# Patient Record
Sex: FEMALE | Race: WHITE | URBAN - METROPOLITAN AREA
[De-identification: names, ages, dates, MRNs, and addresses within clinical notes are randomized per-mention and may not be internally consistent; named-entity substitution may affect disease eponyms.]

---

## 2017-10-16 ENCOUNTER — OUTPATIENT (OUTPATIENT)
Dept: OUTPATIENT SERVICES | Facility: HOSPITAL | Age: 67
LOS: 1 days | Discharge: HOME | End: 2017-10-16

## 2017-10-16 DIAGNOSIS — Z12.31 ENCOUNTER FOR SCREENING MAMMOGRAM FOR MALIGNANT NEOPLASM OF BREAST: ICD-10-CM

## 2018-10-18 ENCOUNTER — OUTPATIENT (OUTPATIENT)
Dept: OUTPATIENT SERVICES | Facility: HOSPITAL | Age: 68
LOS: 1 days | Discharge: HOME | End: 2018-10-18

## 2018-10-18 DIAGNOSIS — Z12.31 ENCOUNTER FOR SCREENING MAMMOGRAM FOR MALIGNANT NEOPLASM OF BREAST: ICD-10-CM

## 2019-10-21 ENCOUNTER — OUTPATIENT (OUTPATIENT)
Dept: OUTPATIENT SERVICES | Facility: HOSPITAL | Age: 69
LOS: 1 days | Discharge: HOME | End: 2019-10-21
Payer: MEDICARE

## 2019-10-21 DIAGNOSIS — Z12.31 ENCOUNTER FOR SCREENING MAMMOGRAM FOR MALIGNANT NEOPLASM OF BREAST: ICD-10-CM

## 2019-10-21 PROCEDURE — 77067 SCR MAMMO BI INCL CAD: CPT | Mod: 26

## 2019-10-21 PROCEDURE — 77063 BREAST TOMOSYNTHESIS BI: CPT | Mod: 26

## 2020-09-10 PROBLEM — Z00.00 ENCOUNTER FOR PREVENTIVE HEALTH EXAMINATION: Status: ACTIVE | Noted: 2020-09-10

## 2020-09-16 ENCOUNTER — TRANSCRIPTION ENCOUNTER (OUTPATIENT)
Age: 70
End: 2020-09-16

## 2020-09-16 ENCOUNTER — APPOINTMENT (OUTPATIENT)
Dept: VASCULAR SURGERY | Facility: CLINIC | Age: 70
End: 2020-09-16
Payer: MEDICARE

## 2020-09-16 DIAGNOSIS — Z86.39 PERSONAL HISTORY OF OTHER ENDOCRINE, NUTRITIONAL AND METABOLIC DISEASE: ICD-10-CM

## 2020-09-16 DIAGNOSIS — Z87.891 PERSONAL HISTORY OF NICOTINE DEPENDENCE: ICD-10-CM

## 2020-09-16 DIAGNOSIS — I77.1 STRICTURE OF ARTERY: ICD-10-CM

## 2020-09-16 DIAGNOSIS — I77.9 DISORDER OF ARTERIES AND ARTERIOLES, UNSPECIFIED: ICD-10-CM

## 2020-09-16 DIAGNOSIS — Z78.9 OTHER SPECIFIED HEALTH STATUS: ICD-10-CM

## 2020-09-16 PROCEDURE — 93880 EXTRACRANIAL BILAT STUDY: CPT

## 2020-09-16 PROCEDURE — 99203 OFFICE O/P NEW LOW 30 MIN: CPT

## 2020-09-16 RX ORDER — MULTIVIT-MIN/FOLIC/VIT K/LYCOP 400-300MCG
1000 TABLET ORAL
Refills: 0 | Status: ACTIVE | COMMUNITY

## 2020-09-16 RX ORDER — LEVOTHYROXINE SODIUM 0.05 MG/1
50 TABLET ORAL
Refills: 0 | Status: ACTIVE | COMMUNITY

## 2020-09-16 RX ORDER — BACILLUS COAGULANS/INULIN 1B-250 MG
CAPSULE ORAL
Refills: 0 | Status: ACTIVE | COMMUNITY

## 2020-09-16 RX ORDER — EZETIMIBE 10 MG/1
10 TABLET ORAL
Refills: 0 | Status: ACTIVE | COMMUNITY

## 2020-09-16 RX ORDER — ASPIRIN 81 MG
81 TABLET, DELAYED RELEASE (ENTERIC COATED) ORAL
Refills: 0 | Status: ACTIVE | COMMUNITY

## 2020-09-16 RX ORDER — CALCIUM CARB/VIT D3/MINERALS 1200-1000
1200-1000 TABLET,CHEWABLE ORAL
Refills: 0 | Status: ACTIVE | COMMUNITY

## 2020-09-16 RX ORDER — LEVOCETIRIZINE DIHYDROCHLORIDE 5 MG/1
5 TABLET, FILM COATED ORAL
Refills: 0 | Status: ACTIVE | COMMUNITY

## 2020-09-16 NOTE — REASON FOR VISIT
[Consultation] : a consultation visit [FreeTextEntry1] : for carotid stenosis and left vertebral artery stenosis

## 2020-09-16 NOTE — ASSESSMENT
[FreeTextEntry1] : Patient is a 70-year-old female with asymptomatic subclavian stenosis and vertebral stenosis on the left side. From my standpoint I would like to continue to monitor her conservatively. A carotid arteries a less than 50% bilaterally. The vascular surgery intervention at this time I recommend trimming on antiplatelet regimen as a lipid lowering agent and I'll see her back in my office in one years time or sooner should become symptomatic

## 2020-10-22 ENCOUNTER — OUTPATIENT (OUTPATIENT)
Dept: OUTPATIENT SERVICES | Facility: HOSPITAL | Age: 70
LOS: 1 days | Discharge: HOME | End: 2020-10-22
Payer: MEDICARE

## 2020-10-22 DIAGNOSIS — Z12.31 ENCOUNTER FOR SCREENING MAMMOGRAM FOR MALIGNANT NEOPLASM OF BREAST: ICD-10-CM

## 2020-10-22 PROCEDURE — 77063 BREAST TOMOSYNTHESIS BI: CPT | Mod: 26

## 2020-10-22 PROCEDURE — 77067 SCR MAMMO BI INCL CAD: CPT | Mod: 26

## 2021-10-05 ENCOUNTER — INPATIENT (INPATIENT)
Facility: HOSPITAL | Age: 71
LOS: 8 days | Discharge: HOME HEALTH | End: 2021-10-14
Attending: SURGERY | Admitting: SURGERY
Payer: MEDICARE

## 2021-10-05 ENCOUNTER — RESULT REVIEW (OUTPATIENT)
Age: 71
End: 2021-10-05

## 2021-10-05 VITALS
RESPIRATION RATE: 18 BRPM | OXYGEN SATURATION: 100 % | DIASTOLIC BLOOD PRESSURE: 73 MMHG | HEART RATE: 110 BPM | SYSTOLIC BLOOD PRESSURE: 115 MMHG | TEMPERATURE: 101 F

## 2021-10-05 DIAGNOSIS — Z98.890 OTHER SPECIFIED POSTPROCEDURAL STATES: Chronic | ICD-10-CM

## 2021-10-05 DIAGNOSIS — Z90.710 ACQUIRED ABSENCE OF BOTH CERVIX AND UTERUS: Chronic | ICD-10-CM

## 2021-10-05 LAB
ALBUMIN SERPL ELPH-MCNC: 3.7 G/DL — SIGNIFICANT CHANGE UP (ref 3.5–5.2)
ALP SERPL-CCNC: 71 U/L — SIGNIFICANT CHANGE UP (ref 30–115)
ALT FLD-CCNC: 17 U/L — SIGNIFICANT CHANGE UP (ref 0–41)
ANION GAP SERPL CALC-SCNC: 19 MMOL/L — HIGH (ref 7–14)
APTT BLD: 23.2 SEC — CRITICAL LOW (ref 27–39.2)
AST SERPL-CCNC: 13 U/L — SIGNIFICANT CHANGE UP (ref 0–41)
BASOPHILS # BLD AUTO: 0.01 K/UL — SIGNIFICANT CHANGE UP (ref 0–0.2)
BASOPHILS NFR BLD AUTO: 0.1 % — SIGNIFICANT CHANGE UP (ref 0–1)
BILIRUB SERPL-MCNC: 0.8 MG/DL — SIGNIFICANT CHANGE UP (ref 0.2–1.2)
BLD GP AB SCN SERPL QL: SIGNIFICANT CHANGE UP
BUN SERPL-MCNC: 22 MG/DL — HIGH (ref 10–20)
CALCIUM SERPL-MCNC: 8.9 MG/DL — SIGNIFICANT CHANGE UP (ref 8.5–10.1)
CHLORIDE SERPL-SCNC: 101 MMOL/L — SIGNIFICANT CHANGE UP (ref 98–110)
CO2 SERPL-SCNC: 17 MMOL/L — SIGNIFICANT CHANGE UP (ref 17–32)
CREAT SERPL-MCNC: 0.6 MG/DL — LOW (ref 0.7–1.5)
EOSINOPHIL # BLD AUTO: 0 K/UL — SIGNIFICANT CHANGE UP (ref 0–0.7)
EOSINOPHIL NFR BLD AUTO: 0 % — SIGNIFICANT CHANGE UP (ref 0–8)
GLUCOSE SERPL-MCNC: 124 MG/DL — HIGH (ref 70–99)
HCT VFR BLD CALC: 33.6 % — LOW (ref 37–47)
HGB BLD-MCNC: 11.2 G/DL — LOW (ref 12–16)
IMM GRANULOCYTES NFR BLD AUTO: 0.3 % — SIGNIFICANT CHANGE UP (ref 0.1–0.3)
INR BLD: 1.22 RATIO — SIGNIFICANT CHANGE UP (ref 0.65–1.3)
LACTATE SERPL-SCNC: 2.4 MMOL/L — HIGH (ref 0.7–2)
LIDOCAIN IGE QN: 11 U/L — SIGNIFICANT CHANGE UP (ref 7–60)
LYMPHOCYTES # BLD AUTO: 0.86 K/UL — LOW (ref 1.2–3.4)
LYMPHOCYTES # BLD AUTO: 7.5 % — LOW (ref 20.5–51.1)
MCHC RBC-ENTMCNC: 29.1 PG — SIGNIFICANT CHANGE UP (ref 27–31)
MCHC RBC-ENTMCNC: 33.3 G/DL — SIGNIFICANT CHANGE UP (ref 32–37)
MCV RBC AUTO: 87.3 FL — SIGNIFICANT CHANGE UP (ref 81–99)
MONOCYTES # BLD AUTO: 0.64 K/UL — HIGH (ref 0.1–0.6)
MONOCYTES NFR BLD AUTO: 5.6 % — SIGNIFICANT CHANGE UP (ref 1.7–9.3)
NEUTROPHILS # BLD AUTO: 9.88 K/UL — HIGH (ref 1.4–6.5)
NEUTROPHILS NFR BLD AUTO: 86.5 % — HIGH (ref 42.2–75.2)
NRBC # BLD: 0 /100 WBCS — SIGNIFICANT CHANGE UP (ref 0–0)
PLATELET # BLD AUTO: 326 K/UL — SIGNIFICANT CHANGE UP (ref 130–400)
POTASSIUM SERPL-MCNC: 3.7 MMOL/L — SIGNIFICANT CHANGE UP (ref 3.5–5)
POTASSIUM SERPL-SCNC: 3.7 MMOL/L — SIGNIFICANT CHANGE UP (ref 3.5–5)
PROT SERPL-MCNC: 6.4 G/DL — SIGNIFICANT CHANGE UP (ref 6–8)
PROTHROM AB SERPL-ACNC: 14 SEC — HIGH (ref 9.95–12.87)
RBC # BLD: 3.85 M/UL — LOW (ref 4.2–5.4)
RBC # FLD: 13.5 % — SIGNIFICANT CHANGE UP (ref 11.5–14.5)
SARS-COV-2 RNA SPEC QL NAA+PROBE: SIGNIFICANT CHANGE UP
SODIUM SERPL-SCNC: 137 MMOL/L — SIGNIFICANT CHANGE UP (ref 135–146)
WBC # BLD: 11.42 K/UL — HIGH (ref 4.8–10.8)
WBC # FLD AUTO: 11.42 K/UL — HIGH (ref 4.8–10.8)

## 2021-10-05 PROCEDURE — 93010 ELECTROCARDIOGRAM REPORT: CPT

## 2021-10-05 PROCEDURE — 71045 X-RAY EXAM CHEST 1 VIEW: CPT | Mod: 26

## 2021-10-05 PROCEDURE — 74177 CT ABD & PELVIS W/CONTRAST: CPT | Mod: 26,MA

## 2021-10-05 PROCEDURE — 99285 EMERGENCY DEPT VISIT HI MDM: CPT | Mod: GC

## 2021-10-05 RX ORDER — PIPERACILLIN AND TAZOBACTAM 4; .5 G/20ML; G/20ML
3.38 INJECTION, POWDER, LYOPHILIZED, FOR SOLUTION INTRAVENOUS ONCE
Refills: 0 | Status: COMPLETED | OUTPATIENT
Start: 2021-10-05 | End: 2021-10-05

## 2021-10-05 RX ORDER — MORPHINE SULFATE 50 MG/1
4 CAPSULE, EXTENDED RELEASE ORAL ONCE
Refills: 0 | Status: DISCONTINUED | OUTPATIENT
Start: 2021-10-05 | End: 2021-10-05

## 2021-10-05 RX ORDER — SODIUM CHLORIDE 9 MG/ML
1000 INJECTION, SOLUTION INTRAVENOUS
Refills: 0 | Status: DISCONTINUED | OUTPATIENT
Start: 2021-10-05 | End: 2021-10-06

## 2021-10-05 RX ORDER — SODIUM CHLORIDE 9 MG/ML
1000 INJECTION, SOLUTION INTRAVENOUS ONCE
Refills: 0 | Status: COMPLETED | OUTPATIENT
Start: 2021-10-05 | End: 2021-10-05

## 2021-10-05 RX ORDER — IBUPROFEN 200 MG
400 TABLET ORAL EVERY 6 HOURS
Refills: 0 | Status: DISCONTINUED | OUTPATIENT
Start: 2021-10-05 | End: 2021-10-06

## 2021-10-05 RX ORDER — ONDANSETRON 8 MG/1
4 TABLET, FILM COATED ORAL ONCE
Refills: 0 | Status: COMPLETED | OUTPATIENT
Start: 2021-10-05 | End: 2021-10-05

## 2021-10-05 RX ORDER — MORPHINE SULFATE 50 MG/1
2 CAPSULE, EXTENDED RELEASE ORAL ONCE
Refills: 0 | Status: DISCONTINUED | OUTPATIENT
Start: 2021-10-05 | End: 2021-10-05

## 2021-10-05 RX ORDER — SODIUM CHLORIDE 9 MG/ML
1000 INJECTION INTRAMUSCULAR; INTRAVENOUS; SUBCUTANEOUS ONCE
Refills: 0 | Status: COMPLETED | OUTPATIENT
Start: 2021-10-05 | End: 2021-10-05

## 2021-10-05 RX ORDER — PANTOPRAZOLE SODIUM 20 MG/1
40 TABLET, DELAYED RELEASE ORAL
Refills: 0 | Status: DISCONTINUED | OUTPATIENT
Start: 2021-10-05 | End: 2021-10-06

## 2021-10-05 RX ORDER — ACETAMINOPHEN 500 MG
975 TABLET ORAL ONCE
Refills: 0 | Status: COMPLETED | OUTPATIENT
Start: 2021-10-05 | End: 2021-10-05

## 2021-10-05 RX ORDER — MORPHINE SULFATE 50 MG/1
2 CAPSULE, EXTENDED RELEASE ORAL EVERY 4 HOURS
Refills: 0 | Status: DISCONTINUED | OUTPATIENT
Start: 2021-10-05 | End: 2021-10-06

## 2021-10-05 RX ORDER — ENOXAPARIN SODIUM 100 MG/ML
40 INJECTION SUBCUTANEOUS EVERY 24 HOURS
Refills: 0 | Status: DISCONTINUED | OUTPATIENT
Start: 2021-10-05 | End: 2021-10-06

## 2021-10-05 RX ORDER — ACETAMINOPHEN 500 MG
650 TABLET ORAL EVERY 6 HOURS
Refills: 0 | Status: DISCONTINUED | OUTPATIENT
Start: 2021-10-05 | End: 2021-10-06

## 2021-10-05 RX ORDER — CHLORHEXIDINE GLUCONATE 213 G/1000ML
1 SOLUTION TOPICAL
Refills: 0 | Status: DISCONTINUED | OUTPATIENT
Start: 2021-10-05 | End: 2021-10-06

## 2021-10-05 RX ADMIN — MORPHINE SULFATE 4 MILLIGRAM(S): 50 CAPSULE, EXTENDED RELEASE ORAL at 17:02

## 2021-10-05 RX ADMIN — MORPHINE SULFATE 2 MILLIGRAM(S): 50 CAPSULE, EXTENDED RELEASE ORAL at 23:07

## 2021-10-05 RX ADMIN — Medication 975 MILLIGRAM(S): at 17:03

## 2021-10-05 RX ADMIN — SODIUM CHLORIDE 1000 MILLILITER(S): 9 INJECTION, SOLUTION INTRAVENOUS at 23:07

## 2021-10-05 RX ADMIN — ONDANSETRON 4 MILLIGRAM(S): 8 TABLET, FILM COATED ORAL at 17:02

## 2021-10-05 RX ADMIN — SODIUM CHLORIDE 1000 MILLILITER(S): 9 INJECTION INTRAMUSCULAR; INTRAVENOUS; SUBCUTANEOUS at 17:03

## 2021-10-05 NOTE — ED PROVIDER NOTE - OBJECTIVE STATEMENT
Patient is a 70 yo female with PMHx of hypothyroidism, HLD, s/p hernia repair, s/p hysterectomy c/o abdominal pain and nausea/vomiting x 4 days. Patient had gradual onset of worsening, severe, constant, sharp, LLQ abdominal pain, associated with nausea and multiple episodes of dark emesis since 4 days ago. Fever up to 101F in ED. Denies chills, chest pain, SOB, cough, diarrhea, blood in stool, melena, dysuria. 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 Patient saw Dr. Juarez today, he sent patient to go to the ED, and called surgery team to see her.

## 2021-10-05 NOTE — H&P ADULT - ATTENDING COMMENTS
above noted discussed case with surgical resident DR Torres and pt on 10/5/21 abdomen soft tenderness diffuse plus rebound ct scan noted needs emergency surgery pt aware of colostomy

## 2021-10-05 NOTE — CONSULT NOTE ADULT - SUBJECTIVE AND OBJECTIVE BOX
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 Juarez in the office with severe Lower abdominal pain. Patient reports that the pain is associated with nausea and vomiting, and has been going on for the past 4 days. Patient also reports fevers as high as 101. Denies chills, chest pain, shortness of breath, change in bowel function, urinary symptoms, or recent trauma.       PAST MEDICAL & SURGICAL HISTORY:  Hypothyroidism    HLD (hyperlipidemia)    S/P hysterectomy    H/O hernia repair        Home Medications:  Synthroid, Zetia      VITALS:  T(F): 101.4 (10-05-21 @ 16:20), Max: 101.4 (10-05-21 @ 16:20)  HR: 110 (10-05-21 @ 16:20) (110 - 110)  BP: 115/73 (10-05-21 @ 16:20) (115/73 - 115/73)  RR: 18 (10-05-21 @ 16:20) (18 - 18)  SpO2: 100% (10-05-21 @ 16:20) (100% - 100%)    PHYSICAL EXAM:  General: NAD, AAOx3, calm and cooperative  Cardiac: Tachycardiac  Respiratory: CTAB, normal respiratory effort, breath sounds equal BL  Abdomen: Soft, non-distended, moderately-tender RLQ and LLQ (great improvement with morphine), no rebound, + voluntary guarding.  Musculoskeletal: Strength 5/5 BL UE/LE, ROM intact, compartments soft  Neuro: Sensation grossly intact and equal throughout, no focal deficits  Skin: Warm/dry, normal color, no jaundice      LAB/STUDIES:                        11.2   11.42 )-----------( 326      ( 05 Oct 2021 16:44 )             33.6         PT/INR - ( 05 Oct 2021 16:55 )   PT: 14.00 sec;   INR: 1.22 ratio         PTT - ( 05 Oct 2021 16:55 )  PTT:23.2 sec          IMAGING:  New CT Pending    CT from July showing diffuse thickening of the descending and sigmoid colon, suspicious for ischemic colitis.    Xray: no free air

## 2021-10-05 NOTE — ED ADULT NURSE NOTE - OBJECTIVE STATEMENT
diffuse abdominal pain onset friday. worsening today. patient abdomen soft tender throughout with intensity to lower quadrants

## 2021-10-05 NOTE — ED ADULT TRIAGE NOTE - CHIEF COMPLAINT QUOTE
abdominal pain x 4 days, patient being sent in by dr. Juarez abdominal pain x 4 days, patient being sent in by dr. Ann toscano (friend) [phone]

## 2021-10-05 NOTE — ED PROVIDER NOTE - CLINICAL SUMMARY MEDICAL DECISION MAKING FREE TEXT BOX
70 yo female PMH hypothyroidism and dyslipidemia sent by Dr Juarez for evaluation of abdominal pain .  Patient developed pain 4-5 days ago, located in the lower abdomen and now associated with nausea and a few episodes of non-bloody emesis since last night, she c/o loss of appetite and poor PO intake foir days.  Denies dysuria or hematuria, no flank pain, no CP, SOB, cough, HA, focal weakness or paresthesias.  Noted to be febrile in ED.   Thin elderly female resting on a stretcher uncomfortable due to pain,, head AT/NC, PERRL, pink conjunctivae,  dry oral mucosa, nml phonation without drooling or trismus, supple neck without midline spine ttp, nml work of breathing, lungs CTA b/l, equal air entry, RRR, speaking full sentences,  well-perfused extremities, distal pulses intact, abdomen soft, ND, + diffuse ttp with voluntary guarding and rebound mostly in the lower quadrants rt>lt,  no leg edema or unilateral calf swelling, A&Ox3, no focal neuro deficits, nml mood and affect.  Plan: analgesia, labs, CT scan, surgery consult, admit.

## 2021-10-05 NOTE — ED PROVIDER NOTE - ATTENDING CONTRIBUTION TO CARE
72 yo female PMH hypothyroidism and dyslipidemia sent by Dr Juarez for evaluation of abdominal pain .  Patient developed pain 4-5 days ago, located in the lower abdomen and now associated with nausea and a few episodes of non-bloody emesis since last night, she c/o loss of appetite and poor PO intake foir days.  Denies dysuria or hematuria, no flank pain, no CP, SOB, cough, HA, focal weakness or paresthesias.  Noted to be febrile in ED.   Thin elderly female resting on a stretcher uncomfortable due to pain,, head AT/NC, PERRL, pink conjunctivae,  dry oral mucosa, nml phonation without drooling or trismus, supple neck without midline spine ttp, nml work of breathing, lungs CTA b/l, equal air entry, RRR, speaking full sentences,  well-perfused extremities, distal pulses intact, abdomen soft, ND, + diffuse ttp with voluntary guarding and rebound mostly in the lower quadrants rt>lt,  no leg edema or unilateral calf swelling, A&Ox3, no focal neuro deficits, nml mood and affect.  Plan: analgesia, labs, CT scan, surgery consult, admit.

## 2021-10-05 NOTE — ED PROVIDER NOTE - PHYSICAL EXAMINATION
CONSTITUTIONAL: Well-developed; well-nourished, in pain and uncomfortable, laying on bed  SKIN: warm, dry  HEAD: Normocephalic; atraumatic.  EYES: no conjunctival injection. PERRL.   ENT: No nasal discharge; airway clear.  NECK: Supple; non tender.  CARD: S1, S2 normal; no murmurs, gallops, or rubs. Tachycardic and regular rhythm.   RESP: No wheezes, rales or rhonchi.  ABD: involuntary guarding, LLQ and suprapubic tenderness, no CVAT b/l  EXT: Normal ROM.  No clubbing, cyanosis or edema.   LYMPH: No acute cervical adenopathy.  NEURO: Alert, oriented, grossly unremarkable  PSYCH: Cooperative, appropriate.

## 2021-10-05 NOTE — ED PROVIDER NOTE - PROGRESS NOTE DETAILS
MQ: Sent in by Dr. Juarez, called surgery and they are aware of patient, will obtain labs, CT abd/pelvis, cxr, ecg, will give iv fluids, morphine, zofran, and reassess Pilat:   repeat VS show low BP, however patient appears well and states her systolic BP is typically 90.  Pain hs improved, but now slowly returning,  @ mg dose of Morphine and fluids were ordered.  CT shows bowel perf.

## 2021-10-05 NOTE — H&P ADULT - HISTORY OF PRESENT ILLNESS
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 Juarez in the office with severe Lower abdominal pain. Patient reports that the pain is associated with nausea and vomiting, and has been going on for the past 4 days. Patient also reports fevers as high as 101. Denies chills, chest pain, shortness of breath, change in bowel function, urinary symptoms, or recent trauma.

## 2021-10-05 NOTE — H&P ADULT - NSHPLABSRESULTS_GEN_ALL_CORE
Labs:                          11.2   11.42 )-----------( 326      ( 05 Oct 2021 16:44 )             33.6       Auto Neutrophil %: 86.5 % (10-05-21 @ 16:44)  Auto Immature Granulocyte %: 0.3 % (10-05-21 @ 16:44)    10-05    137  |  101  |  22<H>  ----------------------------<  124<H>  3.7   |  17  |  0.6<L>    Calcium, Total Serum: 8.9 mg/dL (10-05-21 @ 16:44)    LFTs:             6.4  | 0.8  | 13       ------------------[71      ( 05 Oct 2021 16:44 )  3.7  | x    | 17          Lipase:11       Lactate, Blood: 2.4 mmol/L (10-05-21 @ 16:44)    Coags:     14.00  ----< 1.22    ( 05 Oct 2021 16:55 )     23.2      Radiology:   < from: CT Abdomen and Pelvis w/ IV Cont (10.05.21 @ 21:41) >  IMPRESSION:  Pneumoperitoneum compatible with bowel perforation, the site of which is not definitively determined however the bulk of free air appears to be in the mid pelvis superior to the vaginal cuff and anterior to the rectosigmoid colon.    There are additional findings of left colonic wall thickening, cecal dilatation and inflammatory changes predominantly in the pelvis with trace ascites. Findings may be primary inflammation or reactive.

## 2021-10-05 NOTE — H&P ADULT - NSHPPHYSICALEXAM_GEN_ALL_CORE
General: NAD, AAOx3, calm and cooperative  Cardiac: Tachycardiac  Respiratory: CTAB, normal respiratory effort, breath sounds equal BL  Abdomen: Soft, non-distended, moderately-tender RLQ and LLQ (great improvement with morphine), no rebound, + voluntary guarding.  Musculoskeletal: Strength 5/5 BL UE/LE, ROM intact, compartments soft  Neuro: Sensation grossly intact and equal throughout, no focal deficits  Skin: Warm/dry, normal color, no jaundice

## 2021-10-05 NOTE — ED PROVIDER NOTE - NS ED ROS FT
Review of Systems:  •	CONSTITUTIONAL - No fever, No diaphoresis, No weight change  •	SKIN - No rash  •	HEMATOLOGIC - No abnormal bleeding or bruising  •	EYES - No eye pain, No blurred vision  •	ENT - No change in hearing, No sore throat, No neck pain, No rhinorrhea, No ear pain  •	RESPIRATORY - No shortness of breath, No cough  •	CARDIAC -No chest pain, No palpitations  •	GI - + LLQ abdominal pain, + nausea, + vomiting, No diarrhea, No constipation, No bright red blood per rectum or melena. No flank pain  •                 - No dysuria, frequency, hematuria.   •	ENDO - No polydypsia, No polyuria, No heat/cold intolerance  •	MUSCULOSKELETAL - No joint paint, No swelling, No back pain  •	NEUROLOGIC - No numbness, No focal weakness, No headache, No dizziness  All other systems negative, unless specified in HPI

## 2021-10-05 NOTE — H&P ADULT - ASSESSMENT
71yF w/ PMHx of hypothyroidism, HLD, and a hysterectomy in May 2021, followed by diagnosis of ischemic colitis in July who presented with severe abdominal pain from Dr. Juarez's office, improved with morphine. Mildly tachycardiac and febrile. WBC 11.42. Clinical findings, labs, and imaging consistent with peritonitis secondary to perforated viscus, likely colonic.     Plan:   -Admission to Surgery Service   -Added on and consented for exploratory laparotomy, possible bowel resection, possible ostomy   -NPO, IVF  -Continuous hemodynamic monitoring  -IV Zosyn  -Adequate pain control  -Continue home medications   -DVT ppx   -GI ppx   -Patient and plan discussed with Dr. Juarez

## 2021-10-06 LAB
ANION GAP SERPL CALC-SCNC: 12 MMOL/L — SIGNIFICANT CHANGE UP (ref 7–14)
ANION GAP SERPL CALC-SCNC: 14 MMOL/L — SIGNIFICANT CHANGE UP (ref 7–14)
APPEARANCE UR: CLEAR — SIGNIFICANT CHANGE UP
BACTERIA # UR AUTO: NEGATIVE — SIGNIFICANT CHANGE UP
BASOPHILS # BLD AUTO: 0.01 K/UL — SIGNIFICANT CHANGE UP (ref 0–0.2)
BASOPHILS # BLD AUTO: 0.02 K/UL — SIGNIFICANT CHANGE UP (ref 0–0.2)
BASOPHILS NFR BLD AUTO: 0.1 % — SIGNIFICANT CHANGE UP (ref 0–1)
BASOPHILS NFR BLD AUTO: 0.2 % — SIGNIFICANT CHANGE UP (ref 0–1)
BILIRUB UR-MCNC: NEGATIVE — SIGNIFICANT CHANGE UP
BUN SERPL-MCNC: 13 MG/DL — SIGNIFICANT CHANGE UP (ref 10–20)
BUN SERPL-MCNC: 15 MG/DL — SIGNIFICANT CHANGE UP (ref 10–20)
CALCIUM SERPL-MCNC: 6.9 MG/DL — LOW (ref 8.5–10.1)
CALCIUM SERPL-MCNC: 7.1 MG/DL — LOW (ref 8.5–10.1)
CHLORIDE SERPL-SCNC: 104 MMOL/L — SIGNIFICANT CHANGE UP (ref 98–110)
CHLORIDE SERPL-SCNC: 104 MMOL/L — SIGNIFICANT CHANGE UP (ref 98–110)
CK MB CFR SERPL CALC: 1.5 NG/ML — SIGNIFICANT CHANGE UP (ref 0.6–6.3)
CK MB CFR SERPL CALC: <1 NG/ML — SIGNIFICANT CHANGE UP (ref 0.6–6.3)
CK SERPL-CCNC: 121 U/L — SIGNIFICANT CHANGE UP (ref 0–225)
CK SERPL-CCNC: 59 U/L — SIGNIFICANT CHANGE UP (ref 0–225)
CO2 SERPL-SCNC: 17 MMOL/L — SIGNIFICANT CHANGE UP (ref 17–32)
CO2 SERPL-SCNC: 17 MMOL/L — SIGNIFICANT CHANGE UP (ref 17–32)
COLOR SPEC: YELLOW — SIGNIFICANT CHANGE UP
CREAT SERPL-MCNC: 0.6 MG/DL — LOW (ref 0.7–1.5)
CREAT SERPL-MCNC: <0.5 MG/DL — LOW (ref 0.7–1.5)
DIFF PNL FLD: ABNORMAL
EOSINOPHIL # BLD AUTO: 0 K/UL — SIGNIFICANT CHANGE UP (ref 0–0.7)
EOSINOPHIL # BLD AUTO: 0.01 K/UL — SIGNIFICANT CHANGE UP (ref 0–0.7)
EOSINOPHIL NFR BLD AUTO: 0 % — SIGNIFICANT CHANGE UP (ref 0–8)
EOSINOPHIL NFR BLD AUTO: 0.1 % — SIGNIFICANT CHANGE UP (ref 0–8)
EPI CELLS # UR: 4 /HPF — SIGNIFICANT CHANGE UP (ref 0–5)
GAS PNL BLDA: SIGNIFICANT CHANGE UP
GLUCOSE SERPL-MCNC: 121 MG/DL — HIGH (ref 70–99)
GLUCOSE SERPL-MCNC: 159 MG/DL — HIGH (ref 70–99)
GLUCOSE UR QL: SIGNIFICANT CHANGE UP
HCT VFR BLD CALC: 29.3 % — LOW (ref 37–47)
HCT VFR BLD CALC: 32 % — LOW (ref 37–47)
HCV AB S/CO SERPL IA: 0.03 COI — SIGNIFICANT CHANGE UP
HCV AB SERPL-IMP: SIGNIFICANT CHANGE UP
HGB BLD-MCNC: 10.5 G/DL — LOW (ref 12–16)
HGB BLD-MCNC: 9.7 G/DL — LOW (ref 12–16)
HYALINE CASTS # UR AUTO: 7 /LPF — SIGNIFICANT CHANGE UP (ref 0–7)
IMM GRANULOCYTES NFR BLD AUTO: 0.3 % — SIGNIFICANT CHANGE UP (ref 0.1–0.3)
IMM GRANULOCYTES NFR BLD AUTO: 0.4 % — HIGH (ref 0.1–0.3)
KETONES UR-MCNC: ABNORMAL
LEUKOCYTE ESTERASE UR-ACNC: NEGATIVE — SIGNIFICANT CHANGE UP
LYMPHOCYTES # BLD AUTO: 0.54 K/UL — LOW (ref 1.2–3.4)
LYMPHOCYTES # BLD AUTO: 0.65 K/UL — LOW (ref 1.2–3.4)
LYMPHOCYTES # BLD AUTO: 5.6 % — LOW (ref 20.5–51.1)
LYMPHOCYTES # BLD AUTO: 6.8 % — LOW (ref 20.5–51.1)
MAGNESIUM SERPL-MCNC: 1.4 MG/DL — LOW (ref 1.8–2.4)
MAGNESIUM SERPL-MCNC: 2.3 MG/DL — SIGNIFICANT CHANGE UP (ref 1.8–2.4)
MCHC RBC-ENTMCNC: 29.5 PG — SIGNIFICANT CHANGE UP (ref 27–31)
MCHC RBC-ENTMCNC: 29.8 PG — SIGNIFICANT CHANGE UP (ref 27–31)
MCHC RBC-ENTMCNC: 32.8 G/DL — SIGNIFICANT CHANGE UP (ref 32–37)
MCHC RBC-ENTMCNC: 33.1 G/DL — SIGNIFICANT CHANGE UP (ref 32–37)
MCV RBC AUTO: 89.9 FL — SIGNIFICANT CHANGE UP (ref 81–99)
MCV RBC AUTO: 89.9 FL — SIGNIFICANT CHANGE UP (ref 81–99)
MONOCYTES # BLD AUTO: 0.34 K/UL — SIGNIFICANT CHANGE UP (ref 0.1–0.6)
MONOCYTES # BLD AUTO: 0.68 K/UL — HIGH (ref 0.1–0.6)
MONOCYTES NFR BLD AUTO: 4.3 % — SIGNIFICANT CHANGE UP (ref 1.7–9.3)
MONOCYTES NFR BLD AUTO: 5.9 % — SIGNIFICANT CHANGE UP (ref 1.7–9.3)
NEUTROPHILS # BLD AUTO: 10.17 K/UL — HIGH (ref 1.4–6.5)
NEUTROPHILS # BLD AUTO: 6.98 K/UL — HIGH (ref 1.4–6.5)
NEUTROPHILS NFR BLD AUTO: 88 % — HIGH (ref 42.2–75.2)
NEUTROPHILS NFR BLD AUTO: 88.3 % — HIGH (ref 42.2–75.2)
NITRITE UR-MCNC: NEGATIVE — SIGNIFICANT CHANGE UP
NRBC # BLD: 0 /100 WBCS — SIGNIFICANT CHANGE UP (ref 0–0)
NRBC # BLD: 0 /100 WBCS — SIGNIFICANT CHANGE UP (ref 0–0)
PH UR: 6 — SIGNIFICANT CHANGE UP (ref 5–8)
PHOSPHATE SERPL-MCNC: 2 MG/DL — LOW (ref 2.1–4.9)
PHOSPHATE SERPL-MCNC: 3 MG/DL — SIGNIFICANT CHANGE UP (ref 2.1–4.9)
PLATELET # BLD AUTO: 257 K/UL — SIGNIFICANT CHANGE UP (ref 130–400)
PLATELET # BLD AUTO: 277 K/UL — SIGNIFICANT CHANGE UP (ref 130–400)
POTASSIUM SERPL-MCNC: 3.6 MMOL/L — SIGNIFICANT CHANGE UP (ref 3.5–5)
POTASSIUM SERPL-MCNC: 3.7 MMOL/L — SIGNIFICANT CHANGE UP (ref 3.5–5)
POTASSIUM SERPL-SCNC: 3.6 MMOL/L — SIGNIFICANT CHANGE UP (ref 3.5–5)
POTASSIUM SERPL-SCNC: 3.7 MMOL/L — SIGNIFICANT CHANGE UP (ref 3.5–5)
PROT UR-MCNC: ABNORMAL
RBC # BLD: 3.26 M/UL — LOW (ref 4.2–5.4)
RBC # BLD: 3.56 M/UL — LOW (ref 4.2–5.4)
RBC # FLD: 13.7 % — SIGNIFICANT CHANGE UP (ref 11.5–14.5)
RBC # FLD: 13.8 % — SIGNIFICANT CHANGE UP (ref 11.5–14.5)
RBC CASTS # UR COMP ASSIST: 54 /HPF — HIGH (ref 0–4)
SODIUM SERPL-SCNC: 133 MMOL/L — LOW (ref 135–146)
SODIUM SERPL-SCNC: 135 MMOL/L — SIGNIFICANT CHANGE UP (ref 135–146)
SP GR SPEC: >1.05 (ref 1.01–1.03)
TRIGL SERPL-MCNC: 69 MG/DL — SIGNIFICANT CHANGE UP
TROPONIN T SERPL-MCNC: <0.01 NG/ML — SIGNIFICANT CHANGE UP
TROPONIN T SERPL-MCNC: <0.01 NG/ML — SIGNIFICANT CHANGE UP
UROBILINOGEN FLD QL: SIGNIFICANT CHANGE UP
WBC # BLD: 11.56 K/UL — HIGH (ref 4.8–10.8)
WBC # BLD: 7.91 K/UL — SIGNIFICANT CHANGE UP (ref 4.8–10.8)
WBC # FLD AUTO: 11.56 K/UL — HIGH (ref 4.8–10.8)
WBC # FLD AUTO: 7.91 K/UL — SIGNIFICANT CHANGE UP (ref 4.8–10.8)
WBC UR QL: 9 /HPF — HIGH (ref 0–5)

## 2021-10-06 PROCEDURE — 71045 X-RAY EXAM CHEST 1 VIEW: CPT | Mod: 26

## 2021-10-06 PROCEDURE — 76705 ECHO EXAM OF ABDOMEN: CPT | Mod: 26

## 2021-10-06 PROCEDURE — 88307 TISSUE EXAM BY PATHOLOGIST: CPT | Mod: 26

## 2021-10-06 PROCEDURE — 99291 CRITICAL CARE FIRST HOUR: CPT

## 2021-10-06 PROCEDURE — 88302 TISSUE EXAM BY PATHOLOGIST: CPT | Mod: 26

## 2021-10-06 PROCEDURE — 93010 ELECTROCARDIOGRAM REPORT: CPT

## 2021-10-06 RX ORDER — ENOXAPARIN SODIUM 100 MG/ML
40 INJECTION SUBCUTANEOUS EVERY 24 HOURS
Refills: 0 | Status: DISCONTINUED | OUTPATIENT
Start: 2021-10-06 | End: 2021-10-14

## 2021-10-06 RX ORDER — SODIUM CHLORIDE 9 MG/ML
1000 INJECTION, SOLUTION INTRAVENOUS
Refills: 0 | Status: DISCONTINUED | OUTPATIENT
Start: 2021-10-06 | End: 2021-10-06

## 2021-10-06 RX ORDER — ACETAMINOPHEN 500 MG
1000 TABLET ORAL ONCE
Refills: 0 | Status: COMPLETED | OUTPATIENT
Start: 2021-10-06 | End: 2021-10-06

## 2021-10-06 RX ORDER — ELECTROLYTE SOLUTION,INJ
1 VIAL (ML) INTRAVENOUS
Refills: 0 | Status: DISCONTINUED | OUTPATIENT
Start: 2021-10-06 | End: 2021-10-06

## 2021-10-06 RX ORDER — LEVOTHYROXINE SODIUM 125 MCG
25 TABLET ORAL AT BEDTIME
Refills: 0 | Status: DISCONTINUED | OUTPATIENT
Start: 2021-10-06 | End: 2021-10-14

## 2021-10-06 RX ORDER — SIMVASTATIN 20 MG/1
10 TABLET, FILM COATED ORAL AT BEDTIME
Refills: 0 | Status: DISCONTINUED | OUTPATIENT
Start: 2021-10-06 | End: 2021-10-06

## 2021-10-06 RX ORDER — MAGNESIUM SULFATE 500 MG/ML
2 VIAL (ML) INJECTION ONCE
Refills: 0 | Status: COMPLETED | OUTPATIENT
Start: 2021-10-06 | End: 2021-10-06

## 2021-10-06 RX ORDER — ONDANSETRON 8 MG/1
4 TABLET, FILM COATED ORAL EVERY 6 HOURS
Refills: 0 | Status: DISCONTINUED | OUTPATIENT
Start: 2021-10-06 | End: 2021-10-06

## 2021-10-06 RX ORDER — HYDROMORPHONE HYDROCHLORIDE 2 MG/ML
0.5 INJECTION INTRAMUSCULAR; INTRAVENOUS; SUBCUTANEOUS ONCE
Refills: 0 | Status: DISCONTINUED | OUTPATIENT
Start: 2021-10-06 | End: 2021-10-06

## 2021-10-06 RX ORDER — PIPERACILLIN AND TAZOBACTAM 4; .5 G/20ML; G/20ML
3.38 INJECTION, POWDER, LYOPHILIZED, FOR SOLUTION INTRAVENOUS ONCE
Refills: 0 | Status: COMPLETED | OUTPATIENT
Start: 2021-10-06 | End: 2021-10-06

## 2021-10-06 RX ORDER — PIPERACILLIN AND TAZOBACTAM 4; .5 G/20ML; G/20ML
3.38 INJECTION, POWDER, LYOPHILIZED, FOR SOLUTION INTRAVENOUS EVERY 8 HOURS
Refills: 0 | Status: DISCONTINUED | OUTPATIENT
Start: 2021-10-06 | End: 2021-10-08

## 2021-10-06 RX ORDER — PANTOPRAZOLE SODIUM 20 MG/1
40 TABLET, DELAYED RELEASE ORAL DAILY
Refills: 0 | Status: DISCONTINUED | OUTPATIENT
Start: 2021-10-06 | End: 2021-10-11

## 2021-10-06 RX ORDER — HYDROMORPHONE HYDROCHLORIDE 2 MG/ML
30 INJECTION INTRAMUSCULAR; INTRAVENOUS; SUBCUTANEOUS
Refills: 0 | Status: DISCONTINUED | OUTPATIENT
Start: 2021-10-06 | End: 2021-10-07

## 2021-10-06 RX ORDER — MAGNESIUM SULFATE 500 MG/ML
1 VIAL (ML) INJECTION ONCE
Refills: 0 | Status: COMPLETED | OUTPATIENT
Start: 2021-10-06 | End: 2021-10-06

## 2021-10-06 RX ORDER — METOCLOPRAMIDE HCL 10 MG
10 TABLET ORAL ONCE
Refills: 0 | Status: DISCONTINUED | OUTPATIENT
Start: 2021-10-06 | End: 2021-10-06

## 2021-10-06 RX ORDER — SODIUM CHLORIDE 9 MG/ML
500 INJECTION, SOLUTION INTRAVENOUS ONCE
Refills: 0 | Status: COMPLETED | OUTPATIENT
Start: 2021-10-06 | End: 2021-10-06

## 2021-10-06 RX ORDER — I.V. FAT EMULSION 20 G/100ML
2.12 EMULSION INTRAVENOUS
Qty: 100.06 | Refills: 0 | Status: DISCONTINUED | OUTPATIENT
Start: 2021-10-06 | End: 2021-10-06

## 2021-10-06 RX ORDER — ONDANSETRON 8 MG/1
4 TABLET, FILM COATED ORAL ONCE
Refills: 0 | Status: COMPLETED | OUTPATIENT
Start: 2021-10-06 | End: 2021-10-06

## 2021-10-06 RX ORDER — POTASSIUM CHLORIDE 20 MEQ
20 PACKET (EA) ORAL ONCE
Refills: 0 | Status: COMPLETED | OUTPATIENT
Start: 2021-10-06 | End: 2021-10-06

## 2021-10-06 RX ORDER — NALOXONE HYDROCHLORIDE 4 MG/.1ML
0.1 SPRAY NASAL
Refills: 0 | Status: DISCONTINUED | OUTPATIENT
Start: 2021-10-06 | End: 2021-10-14

## 2021-10-06 RX ORDER — BENZOCAINE 10 %
1 GEL (GRAM) MUCOUS MEMBRANE EVERY 4 HOURS
Refills: 0 | Status: DISCONTINUED | OUTPATIENT
Start: 2021-10-06 | End: 2021-10-14

## 2021-10-06 RX ORDER — CHLORHEXIDINE GLUCONATE 213 G/1000ML
1 SOLUTION TOPICAL
Refills: 0 | Status: DISCONTINUED | OUTPATIENT
Start: 2021-10-06 | End: 2021-10-14

## 2021-10-06 RX ORDER — POTASSIUM PHOSPHATE, MONOBASIC POTASSIUM PHOSPHATE, DIBASIC 236; 224 MG/ML; MG/ML
30 INJECTION, SOLUTION INTRAVENOUS ONCE
Refills: 0 | Status: COMPLETED | OUTPATIENT
Start: 2021-10-06 | End: 2021-10-06

## 2021-10-06 RX ADMIN — Medication 1000 MILLIGRAM(S): at 15:52

## 2021-10-06 RX ADMIN — Medication 16.67 GRAM(S): at 06:30

## 2021-10-06 RX ADMIN — Medication 1 EACH: at 20:20

## 2021-10-06 RX ADMIN — HYDROMORPHONE HYDROCHLORIDE 30 MILLILITER(S): 2 INJECTION INTRAMUSCULAR; INTRAVENOUS; SUBCUTANEOUS at 04:00

## 2021-10-06 RX ADMIN — I.V. FAT EMULSION 31.3 GM/KG/DAY: 20 EMULSION INTRAVENOUS at 20:20

## 2021-10-06 RX ADMIN — PANTOPRAZOLE SODIUM 40 MILLIGRAM(S): 20 TABLET, DELAYED RELEASE ORAL at 12:41

## 2021-10-06 RX ADMIN — ENOXAPARIN SODIUM 40 MILLIGRAM(S): 100 INJECTION SUBCUTANEOUS at 12:41

## 2021-10-06 RX ADMIN — Medication 1000 MILLIGRAM(S): at 23:08

## 2021-10-06 RX ADMIN — Medication 400 MILLIGRAM(S): at 15:52

## 2021-10-06 RX ADMIN — PIPERACILLIN AND TAZOBACTAM 25 GRAM(S): 4; .5 INJECTION, POWDER, LYOPHILIZED, FOR SOLUTION INTRAVENOUS at 22:19

## 2021-10-06 RX ADMIN — ONDANSETRON 4 MILLIGRAM(S): 8 TABLET, FILM COATED ORAL at 04:37

## 2021-10-06 RX ADMIN — Medication 1 SPRAY(S): at 18:48

## 2021-10-06 RX ADMIN — PIPERACILLIN AND TAZOBACTAM 200 GRAM(S): 4; .5 INJECTION, POWDER, LYOPHILIZED, FOR SOLUTION INTRAVENOUS at 09:25

## 2021-10-06 RX ADMIN — SODIUM CHLORIDE 1000 MILLILITER(S): 9 INJECTION, SOLUTION INTRAVENOUS at 06:31

## 2021-10-06 RX ADMIN — Medication 975 MILLIGRAM(S): at 06:23

## 2021-10-06 RX ADMIN — PIPERACILLIN AND TAZOBACTAM 25 GRAM(S): 4; .5 INJECTION, POWDER, LYOPHILIZED, FOR SOLUTION INTRAVENOUS at 14:24

## 2021-10-06 RX ADMIN — Medication 50 MILLIEQUIVALENT(S): at 06:31

## 2021-10-06 RX ADMIN — Medication 400 MILLIGRAM(S): at 21:56

## 2021-10-06 RX ADMIN — Medication 66.67 GRAM(S): at 04:37

## 2021-10-06 RX ADMIN — POTASSIUM PHOSPHATE, MONOBASIC POTASSIUM PHOSPHATE, DIBASIC 83.33 MILLIMOLE(S): 236; 224 INJECTION, SOLUTION INTRAVENOUS at 22:19

## 2021-10-06 RX ADMIN — CHLORHEXIDINE GLUCONATE 1 APPLICATION(S): 213 SOLUTION TOPICAL at 06:23

## 2021-10-06 RX ADMIN — SODIUM CHLORIDE 1000 MILLILITER(S): 9 INJECTION, SOLUTION INTRAVENOUS at 18:15

## 2021-10-06 RX ADMIN — Medication 25 MICROGRAM(S): at 22:38

## 2021-10-06 NOTE — CHART NOTE - NSCHARTNOTEFT_GEN_A_CORE
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 Juarez in the office with severe Lower abdominal pain. Patient reports that the pain is associated with nausea and vomiting, and has been going on for the past 4 days. Patient also reports fevers as high as 101. Denies chills, chest pain, shortness of breath, change in bowel function, urinary symptoms, or recent trauma.  (05 Oct 2021 23:47)      PAST MEDICAL & SURGICAL HISTORY:  Hypothyroidism  HLD (hyperlipidemia)  S/P hysterectomy  H/O hernia repair    Allergies  No Known Allergies    MEDICATIONS  (STANDING):  acetaminophen  IVPB .. 1000 milliGRAM(s) IV Intermittent once  acetaminophen  IVPB .. 1000 milliGRAM(s) IV Intermittent once  acetaminophen  IVPB .. 1000 milliGRAM(s) IV Intermittent once  chlorhexidine 4% Liquid 1 Application(s) Topical <User Schedule>  enoxaparin Injectable 40 milliGRAM(s) SubCutaneous every 24 hours  HYDROmorphone PCA (1 mG/mL) 30 milliLiter(s) PCA Continuous PCA Continuous  levothyroxine Injectable 25 MICROGram(s) IV Push at bedtime  pantoprazole  Injectable 40 milliGRAM(s) IV Push daily    MEDICATIONS  (PRN):  HYDROmorphone  Injectable 0.5 milliGRAM(s) IV Push once PRN Severe Pain (7 - 10)  naloxone Injectable 0.1 milliGRAM(s) IV Push every 3 minutes PRN For ANY of the following changes in patient status:  A. RR LESS THAN 10 breaths per minute, B. Oxygen saturation LESS THAN 90%, C. Sedation score of 6    ICU Vital Signs Last 24 Hrs  T(C): 35.8 (06 Oct 2021 07:35), Max: 38.6 (05 Oct 2021 16:20)  T(F): 96.4 (06 Oct 2021 07:35), Max: 101.4 (05 Oct 2021 16:20)  HR: 75 (06 Oct 2021 07:00) (71 - 110)  BP: 90/52 (06 Oct 2021 06:00) (84/53 - 115/73)  BP(mean): 65 (06 Oct 2021 06:00) (64 - 67)  ABP: 88/49 (06 Oct 2021 07:00) (76/46 - 88/49)  ABP(mean): 63 (06 Oct 2021 07:00) (60 - 72)  RR: 15 (06 Oct 2021 07:00) (13 - 25)  SpO2: 100% (06 Oct 2021 07:00) (97% - 100%)    Drug Dosing Weight  Height (cm): 157.5 (06 Oct 2021 00:25)  Weight (kg): 47.2 (06 Oct 2021 00:25)  BMI (kg/m2): 19 (06 Oct 2021 00:25)  BSA (m2): 1.45 (06 Oct 2021 00:25)    EXAM     Abd:     LE:   Enteral access:  IV access:    LABS  10-06    133<L>  |  104  |  13  ----------------------------<  121<H>  3.7   |  17  |  <0.5<L>    Ca    7.1<L>      06 Oct 2021 04:00  Phos  3.0     10-06  Mg     1.4     10-06    TPro  6.4  /  Alb  3.7  /  TBili  0.8  /  DBili  x   /  AST  13  /  ALT  17  /  AlkPhos  71  10-05                          10.5   11.56 )-----------( 277      ( 06 Oct 2021 04:00 )             32.0       Radiology:  < from: CT Abdomen and Pelvis w/ IV Cont (10.05.21 @ 21:41) >    EXAM:  CT ABDOMEN AND PELVIS IC          *** ADDENDUM 10/05/2021  ***    Findings discussed with ANGIE Su at 11:05 PM.    --- End of Report ---    *** END OF ADDENDUM 10/05/2021  ***    PROCEDURE DATE:  10/05/2021      INTERPRETATION:  CLINICAL STATEMENT: Left lower quadrant pain    TECHNIQUE: Contiguous CT images were obtained of the abdomen and pelvis.  Intravenous Contrast:  Intravenous contrast administered.  100 cc Omnipaque 350 intravenous contrast was administered. 0 cc discarded.  Oral contrast: was not administered.    COMPARISON:  None    FINDINGS:    LOWER CHEST: Bibasilar subsegmental atelectasis.    LIVER: A few hepatic cysts measuring up to 6 cm. A 1.3 cm hypodensity at the hepatic dome (4/33) is indeterminate on this single phase of contrast. A few punctate subcentimeter hypodensities too small to characterize    SPLEEN: Unremarkable.    PANCREAS: Unremarkable.    GALLBLADDER AND BILIARY TREE: Unremarkable CT appearance of the gallbladder    ADRENALS: Unremarkable.    KIDNEYS: Symmetric pattern of renal enhancement. No hydronephrosis. Right renal cyst and hypodensities too small to characterize.    LYMPH NODES: There are no enlarged abdominal or pelvic lymph nodes.    VASCULATURE: The abdominal aorta is normal in caliber. 9mm rim calcified splenic artery aneurysm    BOWEL: The small bowel is predominantly collapsed. There is mild to moderate colonic stool burden. There is wall thickening of the left colon. The cecum appears distended measuring over 5 cm. The appendix is not definitively identified. In the mid pelvis superior to the vaginal cuff and anterior to the rectosigmoid colon there is air and fluid (4/237) which is likely extraluminal. Additional punctate locules of pneumoperitoneum are notedanterior to the liver. Trace ascites. A definitive abscess is not identified, however limited evaluation in the pelvis where there are multiple unopacified bowel loops.    PERITONEUM/RETROPERITONEUM/MESENTERY: As above    PELVIC VISCERA: Hysterectomy    BONES AND SOFT TISSUES: Degenerative changes of the spine with grade 1 retrolisthesis of L1 on L2 and L2 on L3 and grade 1 anterolisthesis of L4 on L5.    IMPRESSION:    Pneumoperitoneum compatible with bowel perforation, the site of which is not definitively determined however the bulk of free air appears to be in the mid pelvis superior to the vaginal cuff and anterior to the rectosigmoid colon.    There are additional findings of left colonic wall thickening, cecal dilatation and inflammatory changes predominantly in the pelvis with trace ascites. Findings may be primary inflammation or reactive.    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 Juarez in the office with severe Lower abdominal pain. Patient reports that the pain is associated with nausea and vomiting, and has been going on for the past 4 days. Patient also reports fevers as high as 101. Denies chills, chest pain, shortness of breath, change in bowel function, urinary symptoms, or recent trauma.  (05 Oct 2021 23:47)    OR early am today for perforated sigmoid color, s/p Exploratory celiotomy, Darrell colectomy, Open appendectomy     PAST MEDICAL & SURGICAL HISTORY:  Hypothyroidism  HLD (hyperlipidemia)  S/P hysterectomy  H/O hernia repair    Allergies  No Known Allergies    MEDICATIONS  (STANDING):  acetaminophen  IVPB .. 1000 milliGRAM(s) IV Intermittent once  acetaminophen  IVPB .. 1000 milliGRAM(s) IV Intermittent once  acetaminophen  IVPB .. 1000 milliGRAM(s) IV Intermittent once  chlorhexidine 4% Liquid 1 Application(s) Topical <User Schedule>  enoxaparin Injectable 40 milliGRAM(s) SubCutaneous every 24 hours  HYDROmorphone PCA (1 mG/mL) 30 milliLiter(s) PCA Continuous PCA Continuous  levothyroxine Injectable 25 MICROGram(s) IV Push at bedtime  pantoprazole  Injectable 40 milliGRAM(s) IV Push daily    MEDICATIONS  (PRN):  HYDROmorphone  Injectable 0.5 milliGRAM(s) IV Push once PRN Severe Pain (7 - 10)  naloxone Injectable 0.1 milliGRAM(s) IV Push every 3 minutes PRN For ANY of the following changes in patient status:  A. RR LESS THAN 10 breaths per minute, B. Oxygen saturation LESS THAN 90%, C. Sedation score of 6    ICU Vital Signs Last 24 Hrs  T(C): 35.8 (06 Oct 2021 07:35), Max: 38.6 (05 Oct 2021 16:20)  T(F): 96.4 (06 Oct 2021 07:35), Max: 101.4 (05 Oct 2021 16:20)  HR: 75 (06 Oct 2021 07:00) (71 - 110)  BP: 90/52 (06 Oct 2021 06:00) (84/53 - 115/73)  BP(mean): 65 (06 Oct 2021 06:00) (64 - 67)  ABP: 88/49 (06 Oct 2021 07:00) (76/46 - 88/49)  ABP(mean): 63 (06 Oct 2021 07:00) (60 - 72)  RR: 15 (06 Oct 2021 07:00) (13 - 25)  SpO2: 100% (06 Oct 2021 07:00) (97% - 100%)    Drug Dosing Weight  Height (cm): 157.5 (06 Oct 2021 00:25)  Weight (kg): 47.2 (06 Oct 2021 00:25)  BMI (kg/m2): 19 (06 Oct 2021 00:25)  BSA (m2): 1.45 (06 Oct 2021 00:25)    EXAM  Awake, alert  Abd: soft +colostomy, +Rt YELITZA   Skin: no breakdown  Enteral access: +NG [place]  IV access: peripheral IV's X 2    LABS  10-06    133<L>  |  104  |  13  ----------------------------<  121<H>  3.7   |  17  |  <0.5<L>    Ca    7.1<L>      06 Oct 2021 04:00  Phos  3.0     10-06  Mg     1.4     10-06    TPro  6.4  /  Alb  3.7  /  TBili  0.8  /  DBili  x   /  AST  13  /  ALT  17  /  AlkPhos  71  10-05                        10.5   11.56 )-----------( 277      ( 06 Oct 2021 04:00 )             32.0     Radiology:  < from: CT Abdomen and Pelvis w/ IV Cont (10.05.21 @ 21:41) >    EXAM:  CT ABDOMEN AND PELVIS IC          *** ADDENDUM 10/05/2021  ***    Findings discussed with ANGIE Su at 11:05 PM.    --- End of Report ---    *** END OF ADDENDUM 10/05/2021  ***    PROCEDURE DATE:  10/05/2021      INTERPRETATION:  CLINICAL STATEMENT: Left lower quadrant pain    TECHNIQUE: Contiguous CT images were obtained of the abdomen and pelvis.  Intravenous Contrast:  Intravenous contrast administered.  100 cc Omnipaque 350 intravenous contrast was administered. 0 cc discarded.  Oral contrast: was not administered.    COMPARISON:  None    FINDINGS:    LOWER CHEST: Bibasilar subsegmental atelectasis.    LIVER: A few hepatic cysts measuring up to 6 cm. A 1.3 cm hypodensity at the hepatic dome (4/33) is indeterminate on this single phase of contrast. A few punctate subcentimeter hypodensities too small to characterize    SPLEEN: Unremarkable.    PANCREAS: Unremarkable.    GALLBLADDER AND BILIARY TREE: Unremarkable CT appearance of the gallbladder    ADRENALS: Unremarkable.    KIDNEYS: Symmetric pattern of renal enhancement. No hydronephrosis. Right renal cyst and hypodensities too small to characterize.    LYMPH NODES: There are no enlarged abdominal or pelvic lymph nodes.    VASCULATURE: The abdominal aorta is normal in caliber. 9mm rim calcified splenic artery aneurysm    BOWEL: The small bowel is predominantly collapsed. There is mild to moderate colonic stool burden. There is wall thickening of the left colon. The cecum appears distended measuring over 5 cm. The appendix is not definitively identified. In the mid pelvis superior to the vaginal cuff and anterior to the rectosigmoid colon there is air and fluid (4/237) which is likely extraluminal. Additional punctate locules of pneumoperitoneum are notedanterior to the liver. Trace ascites. A definitive abscess is not identified, however limited evaluation in the pelvis where there are multiple unopacified bowel loops.    PERITONEUM/RETROPERITONEUM/MESENTERY: As above    PELVIC VISCERA: Hysterectomy    BONES AND SOFT TISSUES: Degenerative changes of the spine with grade 1 retrolisthesis of L1 on L2 and L2 on L3 and grade 1 anterolisthesis of L4 on L5.    IMPRESSION:    Pneumoperitoneum compatible with bowel perforation, the site of which is not definitively determined however the bulk of free air appears to be in the mid pelvis superior to the vaginal cuff and anterior to the rectosigmoid colon.    There are additional findings of left colonic wall thickening, cecal dilatation and inflammatory changes predominantly in the pelvis with trace ascites. Findings may be primary inflammation or reactive.    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 Juarez in the office with severe Lower abdominal pain. Patient reports that the pain is associated with nausea and vomiting, and has been going on for the past 4 days. Patient also reports fevers as high as 101. Denies chills, chest pain, shortness of breath, change in bowel function, urinary symptoms, or recent trauma.  (05 Oct 2021 23:47)    OR early am today for perforated sigmoid color, s/p Exploratory celiotomy, Darrell colectomy, Open appendectomy     PAST MEDICAL & SURGICAL HISTORY:  Hypothyroidism  HLD (hyperlipidemia)  S/P hysterectomy  H/O hernia repair    Allergies  No Known Allergies    MEDICATIONS  (STANDING):  acetaminophen  IVPB .. 1000 milliGRAM(s) IV Intermittent once  acetaminophen  IVPB .. 1000 milliGRAM(s) IV Intermittent once  acetaminophen  IVPB .. 1000 milliGRAM(s) IV Intermittent once  chlorhexidine 4% Liquid 1 Application(s) Topical <User Schedule>  enoxaparin Injectable 40 milliGRAM(s) SubCutaneous every 24 hours  HYDROmorphone PCA (1 mG/mL) 30 milliLiter(s) PCA Continuous PCA Continuous  levothyroxine Injectable 25 MICROGram(s) IV Push at bedtime  pantoprazole  Injectable 40 milliGRAM(s) IV Push daily    MEDICATIONS  (PRN):  HYDROmorphone  Injectable 0.5 milliGRAM(s) IV Push once PRN Severe Pain (7 - 10)  naloxone Injectable 0.1 milliGRAM(s) IV Push every 3 minutes PRN For ANY of the following changes in patient status:  A. RR LESS THAN 10 breaths per minute, B. Oxygen saturation LESS THAN 90%, C. Sedation score of 6    ICU Vital Signs Last 24 Hrs  T(C): 35.8 (06 Oct 2021 07:35), Max: 38.6 (05 Oct 2021 16:20)  T(F): 96.4 (06 Oct 2021 07:35), Max: 101.4 (05 Oct 2021 16:20)  HR: 75 (06 Oct 2021 07:00) (71 - 110)  BP: 90/52 (06 Oct 2021 06:00) (84/53 - 115/73)  BP(mean): 65 (06 Oct 2021 06:00) (64 - 67)  ABP: 88/49 (06 Oct 2021 07:00) (76/46 - 88/49)  ABP(mean): 63 (06 Oct 2021 07:00) (60 - 72)  RR: 15 (06 Oct 2021 07:00) (13 - 25)  SpO2: 100% (06 Oct 2021 07:00) (97% - 100%)    Drug Dosing Weight  Height (cm): 157.5 (06 Oct 2021 00:25)  Weight (kg): 47.2 (06 Oct 2021 00:25)  BMI (kg/m2): 19 (06 Oct 2021 00:25)  BSA (m2): 1.45 (06 Oct 2021 00:25)    EXAM  Awake, alert, verbal  Abd: soft +colostomy with min amount serous fluid, +Rt YELITZA   Skin: no breakdown  Enteral access: +NG [place] to suction  IV access: peripheral IV's X 2    LABS  10-06    133<L>  |  104  |  13  ----------------------------<  121<H>  3.7   |  17  |  <0.5<L>    Ca    7.1<L>      06 Oct 2021 04:00  Phos  3.0     10-06  Mg     1.4     10-06    TPro  6.4  /  Alb  3.7  /  TBili  0.8  /  DBili  x   /  AST  13  /  ALT  17  /  AlkPhos  71  10-05                        10.5   11.56 )-----------( 277      ( 06 Oct 2021 04:00 )             32.0     Radiology:  < from: CT Abdomen and Pelvis w/ IV Cont (10.05.21 @ 21:41) >    EXAM:  CT ABDOMEN AND PELVIS IC          *** ADDENDUM 10/05/2021  ***    Findings discussed with ANGIE Su at 11:05 PM.    --- End of Report ---    *** END OF ADDENDUM 10/05/2021  ***    PROCEDURE DATE:  10/05/2021      INTERPRETATION:  CLINICAL STATEMENT: Left lower quadrant pain    TECHNIQUE: Contiguous CT images were obtained of the abdomen and pelvis.  Intravenous Contrast:  Intravenous contrast administered.  100 cc Omnipaque 350 intravenous contrast was administered. 0 cc discarded.  Oral contrast: was not administered.    COMPARISON:  None    FINDINGS:    LOWER CHEST: Bibasilar subsegmental atelectasis.    LIVER: A few hepatic cysts measuring up to 6 cm. A 1.3 cm hypodensity at the hepatic dome (4/33) is indeterminate on this single phase of contrast. A few punctate subcentimeter hypodensities too small to characterize    SPLEEN: Unremarkable.    PANCREAS: Unremarkable.    GALLBLADDER AND BILIARY TREE: Unremarkable CT appearance of the gallbladder    ADRENALS: Unremarkable.    KIDNEYS: Symmetric pattern of renal enhancement. No hydronephrosis. Right renal cyst and hypodensities too small to characterize.    LYMPH NODES: There are no enlarged abdominal or pelvic lymph nodes.    VASCULATURE: The abdominal aorta is normal in caliber. 9mm rim calcified splenic artery aneurysm    BOWEL: The small bowel is predominantly collapsed. There is mild to moderate colonic stool burden. There is wall thickening of the left colon. The cecum appears distended measuring over 5 cm. The appendix is not definitively identified. In the mid pelvis superior to the vaginal cuff and anterior to the rectosigmoid colon there is air and fluid (4/237) which is likely extraluminal. Additional punctate locules of pneumoperitoneum are notedanterior to the liver. Trace ascites. A definitive abscess is not identified, however limited evaluation in the pelvis where there are multiple unopacified bowel loops.    PERITONEUM/RETROPERITONEUM/MESENTERY: As above    PELVIC VISCERA: Hysterectomy    BONES AND SOFT TISSUES: Degenerative changes of the spine with grade 1 retrolisthesis of L1 on L2 and L2 on L3 and grade 1 anterolisthesis of L4 on L5.    IMPRESSION:    Pneumoperitoneum compatible with bowel perforation, the site of which is not definitively determined however the bulk of free air appears to be in the mid pelvis superior to the vaginal cuff and anterior to the rectosigmoid colon.    There are additional findings of left colonic wall thickening, cecal dilatation and inflammatory changes predominantly in the pelvis with trace ascites. Findings may be primary inflammation or reactive.    --- End of Report ---    < end of copied text >    Diet, NPO (10-06-21 @ 03:29)    ASSESSMENT  70 y/o female with Stercoral Perforation of Sigmoid Colon  Diffuse peritonitis  S/P Darrell's Procedure    PLAN  - add TG level to this am's bloodwork  - start PPN tonight  - replace Mg  - daily bmp, in phos, mg while on parenteral nutrition  - d/w RN in [facility] this morning and on surgical floor later today  - will follow

## 2021-10-06 NOTE — CHART NOTE - NSCHARTNOTEFT_GEN_A_CORE
Post Operative Note  Patient: PRIYANKA DOHERTY 71y (1950) Female   MRN: [id number]  Location: [facility] [address]  Visit: 10-06-21 Inpatient  Date: 10-06-21 @ 07:55    Procedure:   Stercoral ulcer of large intestine    Perforated sigmoid colon     S/P Exploratory celiotomy    Darrell colectomy    Open appendectomy        Subjective:   Nausea: no, Vomiting: no, Ambulating:  no  Pain Assessment: Patient doesn't have complains of pain.    Objective:  Vitals: T(F): 96.4 (10-06-21 @ 07:35), Max: 101.4 (10-05-21 @ 16:20)  HR: 75 (10-06-21 @ 07:00)  BP: 90/52 (10-06-21 @ 06:00) (84/53 - 115/73)  RR: 15 (10-06-21 @ 07:00)  SpO2: 100% (10-06-21 @ 07:00)      In:   10-05-21 @ 07:01  -  10-06-21 @ 07:00  --------------------------------------------------------  IN: 550 mL      Out:   10-05-21 @ 07:01  -  10-06-21 @ 07:00  --------------------------------------------------------  OUT: 170 mL      EBL:     Voided Urine:   10-05-21 @ 07:01  -  10-06-21 @ 07:00  --------------------------------------------------------  OUT: 170 mL      Kan Catheter: yes  Drains:   YELITZA:   10-05-21 @ 07:01  -  10-06-21 @ 07:00  --------------------------------------------------------  OUT: 70 mL            Physical Examination:  General Appearance: NAD,   HEENT: EOMI, sclera non-icteric.  Heart: RRR  Lungs: CTABL  Abdomen:  Soft, non tender, Dressing over midline laparotomy incision, ostomy and YELITZA drain in place. Appropriate for post-op course, nondistended. No rigidity, guarding, or rebound tenderness.   MSK/Extremities: Warm & well-perfused. Peripheral pulses intact.  Skin: Warm, dry. No jaundice.   Incision site: Dressings in place, clean, dry and intact, no signs of infection/active bleeding/drainage    Medications: [Standing]  acetaminophen  IVPB .. 1000 milliGRAM(s) IV Intermittent once  acetaminophen  IVPB .. 1000 milliGRAM(s) IV Intermittent once  acetaminophen  IV`PB .. 1000 milliGRAM(s) IV Intermittent once  chlorhexidine 4% Liquid 1 Application(s) Topical <User Schedule>  enoxaparin Injectable 40 milliGRAM(s) SubCutaneous every 24 hours  HYDROmorphone  Injectable 0.5 milliGRAM(s) IV Push once PRN  HYDROmorphone PCA (1 mG/mL) 30 milliLiter(s) PCA Continuous PCA Continuous  levothyroxine Injectable 25 MICROGram(s) IV Push at bedtime  naloxone Injectable 0.1 milliGRAM(s) IV Push every 3 minutes PRN  pantoprazole  Injectable 40 milliGRAM(s) IV Push daily    Medications: [PRN]  acetaminophen  IVPB .. 1000 milliGRAM(s) IV Intermittent once  acetaminophen  IVPB .. 1000 milliGRAM(s) IV Intermittent once  acetaminophen  IVPB .. 1000 milliGRAM(s) IV Intermittent once  chlorhexidine 4% Liquid 1 Application(s) Topical <User Schedule>  enoxaparin Injectable 40 milliGRAM(s) SubCutaneous every 24 hours  HYDROmorphone  Injectable 0.5 milliGRAM(s) IV Push once PRN  HYDROmorphone PCA (1 mG/mL) 30 milliLiter(s) PCA Continuous PCA Continuous  levothyroxine Injectable 25 MICROGram(s) IV Push at bedtime  naloxone Injectable 0.1 milliGRAM(s) IV Push every 3 minutes PRN  pantoprazole  Injectable 40 milliGRAM(s) IV Push daily      DVT PROPHYLAXIS: enoxaparin Injectable 40 milliGRAM(s) SubCutaneous every 24 hours    GI PROPHYLAXIS: pantoprazole  Injectable 40 milliGRAM(s) IV Push daily    ANTICOAGULATION:   ANTIBIOTICS:        Labs:                        10.5   11.56 )-----------( 277      ( 06 Oct 2021 04:00 )             32.0     10-06    133<L>  |  104  |  13  ----------------------------<  121<H>  3.7   |  17  |  <0.5<L>    Ca    7.1<L>      06 Oct 2021 04:00  Phos  3.0     10-06  Mg     1.4     10-06    TPro  6.4  /  Alb  3.7  /  TBili  0.8  /  DBili  x   /  AST  13  /  ALT  17  /  AlkPhos  71  10-05    PT/INR - ( 05 Oct 2021 16:55 )   PT: 14.00 sec;   INR: 1.22 ratio         PTT - ( 05 Oct 2021 16:55 )  PTT:23.2 sec  CARDIAC MARKERS ( 06 Oct 2021 04:00 )  x     / <0.01 ng/mL / 59 U/L / x     / <1.0 ng/mL        Imaging:  No post-op imaging studies    Assessment:   71 year old female with a PMHx of hypothyroidism, HLD, and a hysterectomy in May 2021 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 Juarez in the office with severe Lower abdominal pain. Patient reports that the pain is associated with nausea and vomiting, and has been going on for the past 4 days. Patient also reports fevers as high as 101. Denies chills, chest pain, shortness of breath, change in bowel function, urinary symptoms, or recent trauma. CT was done which showed pneumoperitoneum and patient was taken to the OR emergently. Patient with large amount of pus and granulation tissue in the abdomen. Colon filled with large amounts of hard stool. Stercoral ulcer with perforation appreciated at mid sigmoid colon, performed colectomy with end colostomy. Appendectomy performed due to adhesions from appendix to the colon and abdominal wall. Abdomen closed with retentions and YELITZA placed, copious irrigation used for washout. PT is stable.      Plan:  - pain control  - O2Sat monitoring and Monitor vitals  - keep MAP>65  - continuous cardiac monitoring  - follow serum electrolytes   - continue antibiotic  - GI and DVT prophylaxis .   - Monitor for bowel function  - Monitor urine output and trial of void once Kan removed  - Monitor wound and dressing for changes, redress as needed  - Continue care per SICU      Date/Time: 10-06-21 @ 07:55

## 2021-10-06 NOTE — BRIEF OPERATIVE NOTE - OPERATION/FINDINGS
Patient with large amount of pus and granulation tissue in the abdomen. Colon filled with large amounts of hard stool. Stercoral ulcer with perforation appreciated at mid sigmoid colon, performed colectomy with end colostomy. Appendectomy performed due to adhesions from appendix to the colon and abdominal wall. Abdomen closed with retentions and YELITZA placed, copious irrigation used for washout.

## 2021-10-06 NOTE — CHART NOTE - NSCHARTNOTEFT_GEN_A_CORE
SICU Transfer Note    PRIYANKA DOHERTY  71y (1950)  [id number]      Transfer from: SICU  Transfer to: Surgery- Green team      SICU COURSE:  71y Female HD# 2    s/p Exploratory celiotomy, Darrell colectomy, Open appendectomy      PAST MEDICAL & SURGICAL HISTORY:  Hypothyroidism  HLD (hyperlipidemia)  S/P hysterectomy  H/O hernia repair    Allergies  No Known Allergies  Intolerances      MEDICATIONS  (STANDING):  acetaminophen  IVPB .. 1000 milliGRAM(s) IV Intermittent once  acetaminophen  IVPB .. 1000 milliGRAM(s) IV Intermittent once  chlorhexidine 4% Liquid 1 Application(s) Topical <User Schedule>  enoxaparin Injectable 40 milliGRAM(s) SubCutaneous every 24 hours  HYDROmorphone PCA (1 mG/mL) 30 milliLiter(s) PCA Continuous PCA Continuous  levothyroxine Injectable 25 MICROGram(s) IV Push at bedtime  pantoprazole  Injectable 40 milliGRAM(s) IV Push daily  piperacillin/tazobactam IVPB.. 3.375 Gram(s) IV Intermittent every 8 hours    MEDICATIONS  (PRN):  naloxone Injectable 0.1 milliGRAM(s) IV Push every 3 minutes PRN For ANY of the following changes in patient status:  A. RR LESS THAN 10 breaths per minute, B. Oxygen saturation LESS THAN 90%, C. Sedation score of 6      Vital Signs Last 24 Hrs  T(C): 35.8 (06 Oct 2021 07:35), Max: 38.6 (05 Oct 2021 16:20)  T(F): 96.4 (06 Oct 2021 07:35), Max: 101.4 (05 Oct 2021 16:20)  HR: 85 (06 Oct 2021 10:00) (71 - 110)  BP: 89/58 (06 Oct 2021 10:00) (81/57 - 115/73)  BP(mean): 69 (06 Oct 2021 10:00) (64 - 69)  RR: 26 (06 Oct 2021 10:00) (13 - 26)  SpO2: 100% (06 Oct 2021 10:00) (97% - 100%)  I&O's Summary    05 Oct 2021 07:01  -  06 Oct 2021 07:00  --------------------------------------------------------  IN: 550 mL / OUT: 170 mL / NET: 380 mL    06 Oct 2021 07:01  -  06 Oct 2021 11:29  --------------------------------------------------------  IN: 100 mL / OUT: 90 mL / NET: 10 mL        LABS                        10.5   11.56 )-----------( 277      ( 06 Oct 2021 04:00 )             32.0       10-06    133<L>  |  104  |  13  ----------------------------<  121<H>  3.7   |  17  |  <0.5<L>    Ca    7.1<L>      06 Oct 2021 04:00  Phos  3.0     10-06  Mg     1.4     10-06    TPro  6.4  /  Alb  3.7  /  TBili  0.8  /  DBili  x   /  AST  13  /  ALT  17  /  AlkPhos  71  10-05      PT/INR - ( 05 Oct 2021 16:55 )   PT: 14.00 sec;   INR: 1.22 ratio         PTT - ( 05 Oct 2021 16:55 )  PTT:23.2 sec  CARDIAC MARKERS ( 06 Oct 2021 10:00 )  x     / <0.01 ng/mL / 121 U/L / x     / 1.5 ng/mL  CARDIAC MARKERS ( 06 Oct 2021 04:00 )  x     / <0.01 ng/mL / 59 U/L / x     / <1.0 ng/mL    COVID-19 PCR: NotDetec (10-05-21 @ 16:44)      Assessment & Plan:  71y Female  s/p Ex-lap ingrma and appendectomy    NEURO:    Acute pain-controlled with dilaudid (0.2mg /6 min) PCA and tylenol IV PRN    RESP:   Extubated    10-06 @ 03:49 -- 7.35 / 34 / 121 / 19 / 96.4        SAT  96.4  Lac 0.90   Home Rx: Xyzal 5 mg oral tablet  Activity- ambulate as tolerated     CARDS:   h/o HLD  Keep normotensive  f/u ECHO  Trop neg x1    GI/NUTR:     Diet, NPO  NGT to LCS    GI Prophylaxis- PPI    Bowel regimen- holding  pantoprazole  Injectable 40 milliGRAM(s) IV Push daily  Monitor colostomy and YELITZA output      /RENAL:        Monitor UO-ward in place    Current Rx:     Labs:          BUN/Cr- 22/0.6  -->,  13/<0.5  -->          Electrolytes-Na 133 // K 3.7 // Mg 1.4 //  Phos 3.0 (10-06 @ 04:00)      HEME/ONC:       DVT prophylaxis-enoxaparin Injectable, SCDs    Labs: Hb/Hct:  11.2/33.6  -->,  10.5/32.0  -->                      Plts:  326  -->,  277  -->                 PTT/INR:  23.2/1.22  --->         ID:  WBC- 11.42  --->>,  11.56  --->>  Temp trend- 24hrs T(F): 98.4 (10-06 @ 03:10), Max: 101.4 (10-05 @ 16:20)  Antibiotics-piperacillin/tazobactam IVPB.. 3.375 q8 hours    Cultures:       ENDO:  #h/o hypothyroidism- on home synthroid     Glucose, Serum: 121 (10-06 @ 04:00)  FS q6 while NPO    HA1C     LINES[place]DRAINS:  [place], [place]     DISPO:    SICU      Follow Up:  -2330 labs  -f/u PCA requirements  - f/u advancement of diet   - restart home meds once on PO diet  - f/u ward  - f/u RUQ US for hepatic cysts      Signed out to:  Date:  Time: SICU Transfer Note    PRIYANKA DOHERTY  71y (1950)  [id number]      Transfer from: SICU  Transfer to: Surgery- Green team      SICU COURSE:  71y Female HD# 2  s/p Exploratory celiotomy, Darrell colectomy, Open appendectomy    71 year old female with a PMHx of hypothyroidism, HLD, and a hysterectomy in May 2021 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 Juarez in the office with severe Lower abdominal pain. Patient reports that the pain is associated with nausea and vomiting, and has been going on for the past 4 days. Patient also reports fevers as high as 101. Denies chills, chest pain, shortness of breath, change in bowel function, urinary symptoms, or recent trauma. CT was done which showed pneumoperitoneum and patient was taken to the OR emergently. Patient with large amount of pus and granulation tissue in the abdomen. Colon filled with large amounts of hard stool. Stercoral ulcer with perforation appreciated at mid sigmoid colon, performed colectomy with end colostomy. Appendectomy performed due to adhesions from appendix to the colon and abdominal wall. Abdomen closed with retentions and YELITZA placed, copious irrigation used for washout.    Patient seen on AM rounds with SICU attending. Patient is hemodynamically stable and ready for downgrade. Of note, patient's BP this morning was 90/50 with MAP of 65. SICU attending aware and approved of downgrade.     Surgery Information  OR time: 2 hours     EBL:  50        IV Fluids:  2.5 L     Blood Products: None  UOP:  80        PAST MEDICAL & SURGICAL HISTORY:  Hypothyroidism  HLD (hyperlipidemia)  S/P hysterectomy  H/O hernia repair    Allergies  No Known Allergies  Intolerances      MEDICATIONS  (STANDING):  acetaminophen  IVPB .. 1000 milliGRAM(s) IV Intermittent once  acetaminophen  IVPB .. 1000 milliGRAM(s) IV Intermittent once  chlorhexidine 4% Liquid 1 Application(s) Topical <User Schedule>  enoxaparin Injectable 40 milliGRAM(s) SubCutaneous every 24 hours  HYDROmorphone PCA (1 mG/mL) 30 milliLiter(s) PCA Continuous PCA Continuous  levothyroxine Injectable 25 MICROGram(s) IV Push at bedtime  pantoprazole  Injectable 40 milliGRAM(s) IV Push daily  piperacillin/tazobactam IVPB.. 3.375 Gram(s) IV Intermittent every 8 hours    MEDICATIONS  (PRN):  naloxone Injectable 0.1 milliGRAM(s) IV Push every 3 minutes PRN For ANY of the following changes in patient status:  A. RR LESS THAN 10 breaths per minute, B. Oxygen saturation LESS THAN 90%, C. Sedation score of 6      Vital Signs Last 24 Hrs  T(C): 35.8 (06 Oct 2021 07:35), Max: 38.6 (05 Oct 2021 16:20)  T(F): 96.4 (06 Oct 2021 07:35), Max: 101.4 (05 Oct 2021 16:20)  HR: 85 (06 Oct 2021 10:00) (71 - 110)  BP: 89/58 (06 Oct 2021 10:00) (81/57 - 115/73)  BP(mean): 69 (06 Oct 2021 10:00) (64 - 69)  RR: 26 (06 Oct 2021 10:00) (13 - 26)  SpO2: 100% (06 Oct 2021 10:00) (97% - 100%)  I&O's Summary    05 Oct 2021 07:01  -  06 Oct 2021 07:00  --------------------------------------------------------  IN: 550 mL / OUT: 170 mL / NET: 380 mL    06 Oct 2021 07:01  -  06 Oct 2021 11:29  --------------------------------------------------------  IN: 100 mL / OUT: 90 mL / NET: 10 mL        LABS                        10.5   11.56 )-----------( 277      ( 06 Oct 2021 04:00 )             32.0       10-06    133<L>  |  104  |  13  ----------------------------<  121<H>  3.7   |  17  |  <0.5<L>    Ca    7.1<L>      06 Oct 2021 04:00  Phos  3.0     10-06  Mg     1.4     10-06    TPro  6.4  /  Alb  3.7  /  TBili  0.8  /  DBili  x   /  AST  13  /  ALT  17  /  AlkPhos  71  10-05      PT/INR - ( 05 Oct 2021 16:55 )   PT: 14.00 sec;   INR: 1.22 ratio         PTT - ( 05 Oct 2021 16:55 )  PTT:23.2 sec  CARDIAC MARKERS ( 06 Oct 2021 10:00 )  x     / <0.01 ng/mL / 121 U/L / x     / 1.5 ng/mL  CARDIAC MARKERS ( 06 Oct 2021 04:00 )  x     / <0.01 ng/mL / 59 U/L / x     / <1.0 ng/mL    COVID-19 PCR: NotDetec (10-05-21 @ 16:44)      Assessment & Plan:  71y Female  s/p Ex-lap ingram and appendectomy    NEURO:    Acute pain-controlled with dilaudid (0.2mg /6 min) PCA and tylenol IV PRN    RESP:   Extubated    10-06 @ 03:49 -- 7.35 / 34 / 121 / 19 / 96.4        SAT  96.4  Lac 0.90   Home Rx: Xyzal 5 mg oral tablet (holding)  Activity- ambulate as tolerated     CARDS:   h/o HLD  BP soft, MAP >65  f/u ECHO  Trop neg x2    GI/NUTR:     Diet, NPO  NGT to LCS    GI Prophylaxis- PPI    Bowel regimen- holding  pantoprazole  Injectable 40 milliGRAM(s) IV Push daily  Monitor colostomy and YELITZA output (70 overnight)      /RENAL:        Monitor UO- ward in place    Current Rx:     Labs:          BUN/Cr- 22/0.6  -->,  13/<0.5  -->          Electrolytes-Na 133 // K 3.7 // Mg 1.4 //  Phos 3.0 (10-06 @ 04:00)      HEME/ONC:       DVT prophylaxis-enoxaparin Injectable, SCDs    Labs: Hb/Hct:  11.2/33.6  -->,  10.5/32.0  -->                      Plts:  326  -->,  277  -->                 PTT/INR:  23.2/1.22  --->         ID:  WBC- 11.42  --->>,  11.56  --->>  Temp trend- 24hrs T(F): 98.4 (10-06 @ 03:10), Max: 101.4 (10-05 @ 16:20)  Antibiotics-piperacillin/tazobactam IVPB.. 3.375 q8 hours         ENDO:  #h/o hypothyroidism- on home synthroid     Glucose, Serum: 121 (10-06 @ 04:00)  FS q6 while NPO    LINES/DRAINS:  PIV, [place]     DISPO:  Floor    Follow Up:  - 2330 labs  - f/u PCA requirements  - f/u advancement of diet   - restart home meds once on PO diet  - f/u ward  - echo  - f/u RUQ US for hepatic cysts      Signed out to: Dr. Lester Fernandez  Date: 10/6/21  Time: 12:30

## 2021-10-06 NOTE — BRIEF OPERATIVE NOTE - NSICDXBRIEFPOSTOP_GEN_ALL_CORE_FT
POST-OP DIAGNOSIS:  Stercoral ulcer of large intestine 06-Oct-2021 03:13:43  Beau Teran  Perforated sigmoid colon 06-Oct-2021 03:13:54  Beau Teran

## 2021-10-06 NOTE — CONSULT NOTE ADULT - SUBJECTIVE AND OBJECTIVE BOX
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 Juarez in the office with severe Lower abdominal pain. Patient reports that the pain is associated with nausea and vomiting, and has been going on for the past 4 days. Patient also reports fevers as high as 101. Denies chills, chest pain, shortness of breath, change in bowel function, urinary symptoms, or recent trauma. CT was done which showed pneumoperitoneum and patient was taken to the OR emergently. Patient with large amount of pus and granulation tissue in the abdomen. Colon filled with large amounts of hard stool. Stercoral ulcer with perforation appreciated at mid sigmoid colon, performed colectomy with end colostomy. Appendectomy performed due to adhesions from appendix to the colon and abdominal wall. Abdomen closed with retentions and YELITZA placed, copious irrigation used for washout    Surgery Information  OR time: 2 hours     EBL:  50        IV Fluids:  2.5 L     Blood Products: None  UOP:  80        PAST MEDICAL & SURGICAL HISTORY:  Hypothyroidism    HLD (hyperlipidemia)    S/P hysterectomy    H/O hernia repair      Home Meds: Home Medications:  levothyroxine 50 mcg (0.05 mg) oral capsule: 1 cap(s) orally once a day (06 Oct 2021 00:44)  Xyzal 5 mg oral tablet: 1 tab(s) orally once a day (in the evening) (06 Oct 2021 00:45)  Zetia 10 mg oral tablet: 1 tab(s) orally once a day (06 Oct 2021 00:45)    Allergies: Allergies    No Known Allergies    Intolerances      Soc:   Advanced Directives: Presumed Full Code     ROS:    REVIEW OF SYSTEMS    [x] A ten-point review of systems was otherwise negative except as noted.  [ ] Due to altered mental status/intubation, subjective information were not able to be obtained from the patient. History was obtained, to the extent possible, from review of the chart and collateral sources of information.      CURRENT MEDICATIONS:   --------------------------------------------------------------------------------------  Neurologic Medications  acetaminophen  IVPB .. 1000 milliGRAM(s) IV Intermittent once  acetaminophen  IVPB .. 1000 milliGRAM(s) IV Intermittent once  acetaminophen  IVPB .. 1000 milliGRAM(s) IV Intermittent once  HYDROmorphone  Injectable 0.5 milliGRAM(s) IV Push once PRN Severe Pain (7 - 10)  HYDROmorphone PCA (1 mG/mL) 30 milliLiter(s) PCA Continuous PCA Continuous    Respiratory Medications    Cardiovascular Medications    Gastrointestinal Medications  lactated ringers. 1000 milliLiter(s) IV Continuous <Continuous>  pantoprazole  Injectable 40 milliGRAM(s) IV Push daily    Genitourinary Medications    Hematologic/Oncologic Medications  enoxaparin Injectable 40 milliGRAM(s) SubCutaneous every 24 hours    Antimicrobial/Immunologic Medications  piperacillin/tazobactam IVPB.. 3.375 Gram(s) IV Intermittent Once  piperacillin/tazobactam IVPB... 3.375 Gram(s) IV Intermittent once    Endocrine/Metabolic Medications  levothyroxine Injectable 25 MICROGram(s) IV Push at bedtime    Topical/Other Medications  chlorhexidine 4% Liquid 1 Application(s) Topical <User Schedule>  naloxone Injectable 0.1 milliGRAM(s) IV Push every 3 minutes PRN For ANY of the following changes in patient status:  A. RR LESS THAN 10 breaths per minute, B. Oxygen saturation LESS THAN 90%, C. Sedation score of 6    --------------------------------------------------------------------------------------    VITAL SIGNS, INS/OUTS (last 24 hours):  --------------------------------------------------------------------------------------  ICU Vital Signs Last 24 Hrs  T(C): 36.9 (06 Oct 2021 03:10), Max: 38.6 (05 Oct 2021 16:20)  T(F): 98.4 (06 Oct 2021 03:10), Max: 101.4 (05 Oct 2021 16:20)  HR: 72 (06 Oct 2021 04:30) (72 - 110)  BP: 88/54 (06 Oct 2021 04:30) (86/54 - 115/73)  BP(mean): --  ABP: 87/50 (06 Oct 2021 04:30) (76/46 - 87/50)  ABP(mean): 65 (06 Oct 2021 04:30) (60 - 66)  RR: 16 (06 Oct 2021 04:30) (14 - 25)  SpO2: 99% (06 Oct 2021 04:30) (97% - 100%)    I&O's Summary    05 Oct 2021 07:01  -  06 Oct 2021 05:16  --------------------------------------------------------  IN: 300 mL / OUT: 105 mL / NET: 195 mL      --------------------------------------------------------------------------------------    EXAM:  General/Neuro    GCS: 15  Exam: Normal, NAD, alert, oriented x 3, no focal deficits. PERRLA      Respiratory  Exam: Lungs clear to auscultation, Normal expansion/effort.      Cardiovascular  Exam: S1, S2.  Regular rate and rhythm.  Peripheral edema      GI  Exam: Abdomen soft, Non-tender, Non-distended.   Nasogastric tube in place.   YELITZA in place, SS  Current Diet:  NPO      Tubes/Lines/Drains  ***  [x] Peripheral IV  [] Central Venous Line     	[] R	[] L	[] IJ	[] Fem	[] SC        Type:	    Date Placed:   [] Arterial Line		[] R	[] L	[] Fem	[] Rad	[] Ax	Date Placed:   [] PICC:         	[] Midline		[] Mediport           [] Urinary Catheter		Date Placed:     Extremities  Exam: Extremities warm, pink, well-perfused.        Derm:  Exam: Good skin turgor, no skin breakdown.      :   Exam: Kan catheter in place.     LABS  --------------------------------------------------------------------------------------  Labs:  CAPILLARY BLOOD GLUCOSE                              10.5   11.56 )-----------( 277      ( 06 Oct 2021 04:00 )             32.0       Auto Neutrophil %: 88.0 % (10-06-21 @ 04:00)  Auto Immature Granulocyte %: 0.3 % (10-06-21 @ 04:00)  Auto Neutrophil %: 86.5 % (10-05-21 @ 16:44)  Auto Immature Granulocyte %: 0.3 % (10-05-21 @ 16:44)    10-06    133<L>  |  104  |  13  ----------------------------<  121<H>  3.7   |  17  |  <0.5<L>      Calcium, Total Serum: 7.1 mg/dL (10-06-21 @ 04:00)      LFTs:             6.4  | 0.8  | 13       ------------------[71      ( 05 Oct 2021 16:44 )  3.7  | x    | 17          Lipase:11     Amylase:x         Blood Gas Arterial, Lactate: 0.90 mmol/L (10-06-21 @ 03:49)  Lactate, Blood: 2.4 mmol/L (10-05-21 @ 16:44)    ABG - ( 06 Oct 2021 03:49 )  pH: 7.35  /  pCO2: 34    /  pO2: 121   / HCO3: 19    / Base Excess: -5.9  /  SaO2: 96.4          Coags:     14.00  ----< 1.22    ( 05 Oct 2021 16:55 )     23.2        CARDIAC MARKERS ( 06 Oct 2021 04:00 )  x     / <0.01 ng/mL / 59 U/L / x     / <1.0 ng/mL          --------------------------------------------------------------------------------------    OTHER LABS    IMAGING RESULTS    < from: CT Abdomen and Pelvis w/ IV Cont (10.05.21 @ 21:41) >  Pneumoperitoneum compatible with bowel perforation, the site of which is not definitively determined however the bulk of free air appears to be in the mid pelvis superior to the vaginal cuff and anterior to the rectosigmoid colon.    There are additional findings of left colonic wall thickening, cecal dilatation and inflammatory changes predominantly in the pelvis with trace ascites. Findings may be primary inflammation or reactive.    < end of copied text >

## 2021-10-06 NOTE — PRE-ANESTHESIA EVALUATION ADULT - NSANTHPMHFT_GEN_ALL_CORE
71yF w/ PMHx of hypothyroidism, HLD, and a hysterectomy in May 2021, followed by diagnosis of ischemic colitis in July who presented with severe abdominal pain. Imaging consistent with peritonitis secondary to perforated viscus, likely colonic. Now going for ex-lap.

## 2021-10-06 NOTE — BRIEF OPERATIVE NOTE - NSICDXBRIEFPROCEDURE_GEN_ALL_CORE_FT
PROCEDURES:  Exploratory celiotomy 06-Oct-2021 03:12:14  Beau Teran  Darrell colectomy 06-Oct-2021 03:12:25  Beau Teran  Open appendectomy 06-Oct-2021 03:16:03  Beau Teran

## 2021-10-06 NOTE — CONSULT NOTE ADULT - ATTENDING COMMENTS
72 y/o female with Stercoral Perforation of Sigmoid Colon.  Diffuse peritonitis.  S/P Darrell's Procedure.  Acute postoperative pain.  Atelectasis.  Hypotension.  At risk for hemodynamic instability.    PLAN:  - pain control  - O2Sat monitoring  - keep MAP>65  - continuous cardiac monitoring  - use Nicom  - follow serum electrolytes and UOP  - continue antibiotic  - GI and DVT prophylaxis

## 2021-10-07 ENCOUNTER — TRANSCRIPTION ENCOUNTER (OUTPATIENT)
Age: 71
End: 2021-10-07

## 2021-10-07 LAB
ANION GAP SERPL CALC-SCNC: 10 MMOL/L — SIGNIFICANT CHANGE UP (ref 7–14)
ANION GAP SERPL CALC-SCNC: 13 MMOL/L — SIGNIFICANT CHANGE UP (ref 7–14)
ANION GAP SERPL CALC-SCNC: 15 MMOL/L — HIGH (ref 7–14)
BASOPHILS # BLD AUTO: 0.01 K/UL — SIGNIFICANT CHANGE UP (ref 0–0.2)
BASOPHILS NFR BLD AUTO: 0.2 % — SIGNIFICANT CHANGE UP (ref 0–1)
BUN SERPL-MCNC: 10 MG/DL — SIGNIFICANT CHANGE UP (ref 10–20)
BUN SERPL-MCNC: 11 MG/DL — SIGNIFICANT CHANGE UP (ref 10–20)
BUN SERPL-MCNC: 13 MG/DL — SIGNIFICANT CHANGE UP (ref 10–20)
CALCIUM SERPL-MCNC: 6.9 MG/DL — LOW (ref 8.5–10.1)
CALCIUM SERPL-MCNC: 6.9 MG/DL — LOW (ref 8.5–10.1)
CALCIUM SERPL-MCNC: 7.4 MG/DL — LOW (ref 8.5–10.1)
CHLORIDE SERPL-SCNC: 103 MMOL/L — SIGNIFICANT CHANGE UP (ref 98–110)
CHLORIDE SERPL-SCNC: 104 MMOL/L — SIGNIFICANT CHANGE UP (ref 98–110)
CHLORIDE SERPL-SCNC: 105 MMOL/L — SIGNIFICANT CHANGE UP (ref 98–110)
CO2 SERPL-SCNC: 16 MMOL/L — LOW (ref 17–32)
CO2 SERPL-SCNC: 18 MMOL/L — SIGNIFICANT CHANGE UP (ref 17–32)
CO2 SERPL-SCNC: 20 MMOL/L — SIGNIFICANT CHANGE UP (ref 17–32)
COVID-19 SPIKE DOMAIN AB INTERP: POSITIVE
COVID-19 SPIKE DOMAIN ANTIBODY RESULT: >250 U/ML — HIGH
CREAT SERPL-MCNC: 0.5 MG/DL — LOW (ref 0.7–1.5)
CREAT SERPL-MCNC: <0.5 MG/DL — LOW (ref 0.7–1.5)
CREAT SERPL-MCNC: <0.5 MG/DL — LOW (ref 0.7–1.5)
EOSINOPHIL # BLD AUTO: 0.12 K/UL — SIGNIFICANT CHANGE UP (ref 0–0.7)
EOSINOPHIL NFR BLD AUTO: 1.8 % — SIGNIFICANT CHANGE UP (ref 0–8)
GLUCOSE BLDC GLUCOMTR-MCNC: 102 MG/DL — HIGH (ref 70–99)
GLUCOSE BLDC GLUCOMTR-MCNC: 116 MG/DL — HIGH (ref 70–99)
GLUCOSE BLDC GLUCOMTR-MCNC: 118 MG/DL — HIGH (ref 70–99)
GLUCOSE BLDC GLUCOMTR-MCNC: 120 MG/DL — HIGH (ref 70–99)
GLUCOSE SERPL-MCNC: 101 MG/DL — HIGH (ref 70–99)
GLUCOSE SERPL-MCNC: 109 MG/DL — HIGH (ref 70–99)
GLUCOSE SERPL-MCNC: 122 MG/DL — HIGH (ref 70–99)
HCT VFR BLD CALC: 26.9 % — LOW (ref 37–47)
HCT VFR BLD CALC: 29.6 % — LOW (ref 37–47)
HGB BLD-MCNC: 8.8 G/DL — LOW (ref 12–16)
HGB BLD-MCNC: 9.6 G/DL — LOW (ref 12–16)
IMM GRANULOCYTES NFR BLD AUTO: 0.6 % — HIGH (ref 0.1–0.3)
LYMPHOCYTES # BLD AUTO: 0.68 K/UL — LOW (ref 1.2–3.4)
LYMPHOCYTES # BLD AUTO: 10.4 % — LOW (ref 20.5–51.1)
MAGNESIUM SERPL-MCNC: 2.2 MG/DL — SIGNIFICANT CHANGE UP (ref 1.8–2.4)
MAGNESIUM SERPL-MCNC: 2.3 MG/DL — SIGNIFICANT CHANGE UP (ref 1.8–2.4)
MAGNESIUM SERPL-MCNC: 2.3 MG/DL — SIGNIFICANT CHANGE UP (ref 1.8–2.4)
MCHC RBC-ENTMCNC: 28.5 PG — SIGNIFICANT CHANGE UP (ref 27–31)
MCHC RBC-ENTMCNC: 29.5 PG — SIGNIFICANT CHANGE UP (ref 27–31)
MCHC RBC-ENTMCNC: 32.4 G/DL — SIGNIFICANT CHANGE UP (ref 32–37)
MCHC RBC-ENTMCNC: 32.7 G/DL — SIGNIFICANT CHANGE UP (ref 32–37)
MCV RBC AUTO: 87.8 FL — SIGNIFICANT CHANGE UP (ref 81–99)
MCV RBC AUTO: 90.3 FL — SIGNIFICANT CHANGE UP (ref 81–99)
MONOCYTES # BLD AUTO: 0.49 K/UL — SIGNIFICANT CHANGE UP (ref 0.1–0.6)
MONOCYTES NFR BLD AUTO: 7.5 % — SIGNIFICANT CHANGE UP (ref 1.7–9.3)
NEUTROPHILS # BLD AUTO: 5.21 K/UL — SIGNIFICANT CHANGE UP (ref 1.4–6.5)
NEUTROPHILS NFR BLD AUTO: 79.5 % — HIGH (ref 42.2–75.2)
NRBC # BLD: 0 /100 WBCS — SIGNIFICANT CHANGE UP (ref 0–0)
NRBC # BLD: 0 /100 WBCS — SIGNIFICANT CHANGE UP (ref 0–0)
PHOSPHATE SERPL-MCNC: 1.1 MG/DL — LOW (ref 2.1–4.9)
PHOSPHATE SERPL-MCNC: 2.5 MG/DL — SIGNIFICANT CHANGE UP (ref 2.1–4.9)
PHOSPHATE SERPL-MCNC: 2.6 MG/DL — SIGNIFICANT CHANGE UP (ref 2.1–4.9)
PLATELET # BLD AUTO: 231 K/UL — SIGNIFICANT CHANGE UP (ref 130–400)
PLATELET # BLD AUTO: 261 K/UL — SIGNIFICANT CHANGE UP (ref 130–400)
POTASSIUM SERPL-MCNC: 3.6 MMOL/L — SIGNIFICANT CHANGE UP (ref 3.5–5)
POTASSIUM SERPL-MCNC: 3.9 MMOL/L — SIGNIFICANT CHANGE UP (ref 3.5–5)
POTASSIUM SERPL-MCNC: 4.4 MMOL/L — SIGNIFICANT CHANGE UP (ref 3.5–5)
POTASSIUM SERPL-SCNC: 3.6 MMOL/L — SIGNIFICANT CHANGE UP (ref 3.5–5)
POTASSIUM SERPL-SCNC: 3.9 MMOL/L — SIGNIFICANT CHANGE UP (ref 3.5–5)
POTASSIUM SERPL-SCNC: 4.4 MMOL/L — SIGNIFICANT CHANGE UP (ref 3.5–5)
RBC # BLD: 2.98 M/UL — LOW (ref 4.2–5.4)
RBC # BLD: 3.37 M/UL — LOW (ref 4.2–5.4)
RBC # FLD: 13.5 % — SIGNIFICANT CHANGE UP (ref 11.5–14.5)
RBC # FLD: 13.7 % — SIGNIFICANT CHANGE UP (ref 11.5–14.5)
SARS-COV-2 IGG+IGM SERPL QL IA: >250 U/ML — HIGH
SARS-COV-2 IGG+IGM SERPL QL IA: POSITIVE
SODIUM SERPL-SCNC: 134 MMOL/L — LOW (ref 135–146)
SODIUM SERPL-SCNC: 135 MMOL/L — SIGNIFICANT CHANGE UP (ref 135–146)
SODIUM SERPL-SCNC: 135 MMOL/L — SIGNIFICANT CHANGE UP (ref 135–146)
SURGICAL PATHOLOGY STUDY: SIGNIFICANT CHANGE UP
WBC # BLD: 6.55 K/UL — SIGNIFICANT CHANGE UP (ref 4.8–10.8)
WBC # BLD: 7.48 K/UL — SIGNIFICANT CHANGE UP (ref 4.8–10.8)
WBC # FLD AUTO: 6.55 K/UL — SIGNIFICANT CHANGE UP (ref 4.8–10.8)
WBC # FLD AUTO: 7.48 K/UL — SIGNIFICANT CHANGE UP (ref 4.8–10.8)

## 2021-10-07 PROCEDURE — 71045 X-RAY EXAM CHEST 1 VIEW: CPT | Mod: 26

## 2021-10-07 PROCEDURE — 93306 TTE W/DOPPLER COMPLETE: CPT | Mod: 26

## 2021-10-07 RX ORDER — GABAPENTIN 400 MG/1
100 CAPSULE ORAL THREE TIMES A DAY
Refills: 0 | Status: DISCONTINUED | OUTPATIENT
Start: 2021-10-07 | End: 2021-10-11

## 2021-10-07 RX ORDER — OXYCODONE HYDROCHLORIDE 5 MG/1
5 TABLET ORAL EVERY 8 HOURS
Refills: 0 | Status: DISCONTINUED | OUTPATIENT
Start: 2021-10-07 | End: 2021-10-14

## 2021-10-07 RX ORDER — GABAPENTIN 400 MG/1
100 CAPSULE ORAL THREE TIMES A DAY
Refills: 0 | Status: DISCONTINUED | OUTPATIENT
Start: 2021-10-07 | End: 2021-10-07

## 2021-10-07 RX ORDER — METHOCARBAMOL 500 MG/1
500 TABLET, FILM COATED ORAL
Refills: 0 | Status: DISCONTINUED | OUTPATIENT
Start: 2021-10-07 | End: 2021-10-14

## 2021-10-07 RX ORDER — ACETAMINOPHEN 500 MG
1000 TABLET ORAL ONCE
Refills: 0 | Status: COMPLETED | OUTPATIENT
Start: 2021-10-07 | End: 2021-10-07

## 2021-10-07 RX ORDER — ACETAMINOPHEN 500 MG
650 TABLET ORAL EVERY 6 HOURS
Refills: 0 | Status: DISCONTINUED | OUTPATIENT
Start: 2021-10-07 | End: 2021-10-14

## 2021-10-07 RX ORDER — POTASSIUM CHLORIDE 20 MEQ
20 PACKET (EA) ORAL
Refills: 0 | Status: COMPLETED | OUTPATIENT
Start: 2021-10-07 | End: 2021-10-07

## 2021-10-07 RX ORDER — ELECTROLYTE SOLUTION,INJ
1 VIAL (ML) INTRAVENOUS
Refills: 0 | Status: DISCONTINUED | OUTPATIENT
Start: 2021-10-07 | End: 2021-10-07

## 2021-10-07 RX ORDER — ACETAMINOPHEN 500 MG
1000 TABLET ORAL ONCE
Refills: 0 | Status: DISCONTINUED | OUTPATIENT
Start: 2021-10-07 | End: 2021-10-07

## 2021-10-07 RX ORDER — I.V. FAT EMULSION 20 G/100ML
2.12 EMULSION INTRAVENOUS
Qty: 100.06 | Refills: 0 | Status: DISCONTINUED | OUTPATIENT
Start: 2021-10-07 | End: 2021-10-07

## 2021-10-07 RX ORDER — ONDANSETRON 8 MG/1
4 TABLET, FILM COATED ORAL ONCE
Refills: 0 | Status: COMPLETED | OUTPATIENT
Start: 2021-10-07 | End: 2021-10-09

## 2021-10-07 RX ORDER — KETOROLAC TROMETHAMINE 30 MG/ML
15 SYRINGE (ML) INJECTION EVERY 6 HOURS
Refills: 0 | Status: DISCONTINUED | OUTPATIENT
Start: 2021-10-07 | End: 2021-10-12

## 2021-10-07 RX ADMIN — PIPERACILLIN AND TAZOBACTAM 25 GRAM(S): 4; .5 INJECTION, POWDER, LYOPHILIZED, FOR SOLUTION INTRAVENOUS at 21:35

## 2021-10-07 RX ADMIN — PANTOPRAZOLE SODIUM 40 MILLIGRAM(S): 20 TABLET, DELAYED RELEASE ORAL at 11:04

## 2021-10-07 RX ADMIN — Medication 25 MICROGRAM(S): at 21:35

## 2021-10-07 RX ADMIN — ENOXAPARIN SODIUM 40 MILLIGRAM(S): 100 INJECTION SUBCUTANEOUS at 11:06

## 2021-10-07 RX ADMIN — Medication 1000 MILLIGRAM(S): at 06:30

## 2021-10-07 RX ADMIN — Medication 15 MILLIGRAM(S): at 18:11

## 2021-10-07 RX ADMIN — Medication 15 MILLIGRAM(S): at 11:04

## 2021-10-07 RX ADMIN — Medication 1 SPRAY(S): at 16:10

## 2021-10-07 RX ADMIN — Medication 50 MILLIEQUIVALENT(S): at 20:34

## 2021-10-07 RX ADMIN — CHLORHEXIDINE GLUCONATE 1 APPLICATION(S): 213 SOLUTION TOPICAL at 06:13

## 2021-10-07 RX ADMIN — Medication 15 MILLIGRAM(S): at 14:36

## 2021-10-07 RX ADMIN — Medication 400 MILLIGRAM(S): at 05:19

## 2021-10-07 RX ADMIN — I.V. FAT EMULSION 31.3 GM/KG/DAY: 20 EMULSION INTRAVENOUS at 20:43

## 2021-10-07 RX ADMIN — Medication 62.5 MILLIMOLE(S): at 05:19

## 2021-10-07 RX ADMIN — PIPERACILLIN AND TAZOBACTAM 25 GRAM(S): 4; .5 INJECTION, POWDER, LYOPHILIZED, FOR SOLUTION INTRAVENOUS at 14:41

## 2021-10-07 RX ADMIN — Medication 1 EACH: at 20:43

## 2021-10-07 RX ADMIN — Medication 400 MILLIGRAM(S): at 10:57

## 2021-10-07 RX ADMIN — Medication 15 MILLIGRAM(S): at 23:16

## 2021-10-07 RX ADMIN — PIPERACILLIN AND TAZOBACTAM 25 GRAM(S): 4; .5 INJECTION, POWDER, LYOPHILIZED, FOR SOLUTION INTRAVENOUS at 05:19

## 2021-10-07 RX ADMIN — Medication 1000 MILLIGRAM(S): at 10:58

## 2021-10-07 RX ADMIN — Medication 50 MILLIEQUIVALENT(S): at 16:32

## 2021-10-07 RX ADMIN — Medication 15 MILLIGRAM(S): at 18:32

## 2021-10-07 NOTE — DISCHARGE NOTE NURSING/CASE MANAGEMENT/SOCIAL WORK - PATIENT PORTAL LINK FT
You can access the FollowMyHealth Patient Portal offered by [facility] by registering at the following website: [email or web address]. By joining [employer]’s FollowMyHealth portal, you will also be able to view your health information using other applications (apps) compatible with our system.

## 2021-10-07 NOTE — PHYSICAL THERAPY INITIAL EVALUATION ADULT - ADDITIONAL COMMENTS
Patent lives alwith 6 steps to enter and 12 steps to bedroom. Was independent in ADL's and ambulation without assistive device prior to hospitalization

## 2021-10-07 NOTE — PHYSICAL THERAPY INITIAL EVALUATION ADULT - WORK/LEISURE ACTIVITY, REHAB EVAL
83 year old male with PMH of recent lumbar laminectomy on 10/27 [facility] .   Course complicated by coffee ground emesis, serosanguinous fluid drainage from surgical incision with increasing leukocytosis suggestive of spinal abscess, and hypoxia. Transferred to ICU for closer monitoring. S/P I&D POD#2, consistent with hematoma, abscess ruled out. 83 year old male with PMH of recent lumbar laminectomy on 10/27 presents with AMS from [facility] facility likely secondary to Encephalopathy .   Course complicated by coffee ground emesis, serosanguinous fluid drainage from surgical incision with increasing leukocytosis suggestive of spinal abscess, and hypoxia. Transferred to ICU for closer monitoring. S/P I&D POD#2, consistent with hematoma, abscess ruled out, on antibiotics for aspiration pneumonia independent

## 2021-10-07 NOTE — CONSULT NOTE ADULT - SUBJECTIVE AND OBJECTIVE BOX
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 Juarez in the office with severe Lower abdominal pain. Patient reports that the pain is associated with nausea and vomiting, and has been going on for the past 4 days. Patient also reports fevers as high as 101. Denies chills, chest pain, shortness of breath, change in bowel function, urinary symptoms, or recent trauma.  s/p Ex-lap ingram and appendectomy on 10/6 for perforated sigmoid colon       PAST MEDICAL & SURGICAL HISTORY:  Hypothyroidism    HLD (hyperlipidemia)    S/P hysterectomy    H/O hernia repair        Hospital Course:    TODAY'S SUBJECTIVE & REVIEW OF SYMPTOMS:     Constitutional WNL   Cardio WNL   Resp WNL   GI WNL  Heme WNL  Endo WNL  Skin WNL  MSK pain  Neuro WNL  Cognitive WNL  Psych WNL      MEDICATIONS  (STANDING):  acetaminophen   Tablet .. 650 milliGRAM(s) Oral every 6 hours  chlorhexidine 4% Liquid 1 Application(s) Topical <User Schedule>  enoxaparin Injectable 40 milliGRAM(s) SubCutaneous every 24 hours  fat emulsion (Plant Based) 20% Infusion 2.12 Gm/kG/Day (31.3 mL/Hr) IV Continuous <Continuous>  fat emulsion (Plant Based) 20% Infusion 2.12 Gm/kG/Day (31.3 mL/Hr) IV Continuous <Continuous>  gabapentin 100 milliGRAM(s) Oral three times a day  ketorolac   Injectable 15 milliGRAM(s) IV Push every 6 hours  levothyroxine Injectable 25 MICROGram(s) IV Push at bedtime  ondansetron Injectable 4 milliGRAM(s) IV Push once  pantoprazole  Injectable 40 milliGRAM(s) IV Push daily  Parenteral Nutrition - Adult 1 Each (60 mL/Hr) TPN Continuous <Continuous>  Parenteral Nutrition - Adult 1 Each (60 mL/Hr) TPN Continuous <Continuous>  piperacillin/tazobactam IVPB.. 3.375 Gram(s) IV Intermittent every 8 hours  potassium chloride  20 mEq/100 mL IVPB 20 milliEquivalent(s) IV Intermittent every 2 hours    MEDICATIONS  (PRN):  benzocaine 20% Spray 1 Spray(s) Topical every 4 hours PRN pain  methocarbamol 500 milliGRAM(s) Oral four times a day PRN Muscle Spasm  naloxone Injectable 0.1 milliGRAM(s) IV Push every 3 minutes PRN For ANY of the following changes in patient status:  A. RR LESS THAN 10 breaths per minute, B. Oxygen saturation LESS THAN 90%, C. Sedation score of 6  oxyCODONE    IR 5 milliGRAM(s) Oral every 8 hours PRN Severe Pain (7 - 10)      FAMILY HISTORY:      Allergies    No Known Allergies    Intolerances        SOCIAL HISTORY:    [  ] Etoh  [  ] Smoking  [  ] Substance abuse     Home Environment:  [ x  ] Home Alone  [   ] Lives with Family  [   ] Home Health Aid    Dwelling:  [   ] Apartment  [ x  ] Private House  [   ] Adult Home  [   ] Skilled Nursing Facility      [   ] Short Term  [   ] Long Term  [ x  ] Stairs       Elevator [   ]    FUNCTIONAL STATUS PTA: (Check all that apply)  Ambulation: [ x   ]Independent    [   ] Dependent     [   ] Non-Ambulatory  Assistive Device: [   ] SA Cane  [   ]  Q Cane  [   ] Walker  [   ]  Wheelchair  ADL : [ x  ] Independent  [    ]  Dependent       Vital Signs Last 24 Hrs  T(C): 35.9 (07 Oct 2021 08:49), Max: 38.8 (06 Oct 2021 16:52)  T(F): 96.6 (07 Oct 2021 08:49), Max: 101.8 (06 Oct 2021 16:52)  HR: 91 (07 Oct 2021 08:49) (91 - 99)  BP: 99/67 (07 Oct 2021 08:49) (81/52 - 99/67)  BP(mean): --  RR: 18 (07 Oct 2021 08:49) (18 - 18)  SpO2: 90% (07 Oct 2021 08:49) (90% - 100%)      PHYSICAL EXAM: Awake & Alert  GENERAL: NAD  HEAD:  Normocephalic  CHEST/LUNG: Clear   HEART: S1S2+  ABDOMEN: Soft, Nontender  EXTREMITIES:  no calf tenderness    NERVOUS SYSTEM:  Cranial Nerves 2-12 intact [   ] Abnormal  [   ]  ROM: WFL all extremities [  x ]  Abnormal [   ]  Motor Strength: WFL all extremities  [ x  ]  Abnormal [   ]  Sensation: intact to light touch [ x  ] Abnormal [   ]    FUNCTIONAL STATUS:  Bed Mobility: Independent [   ]  Supervision [   ]  Needs Assistance [  x ]  N/A [   ]  Transfers: Independent [   ]  Supervision [   ]  Needs Assistance [  x ]  N/A [   ]   Ambulation: Independent [   ]  Supervision [   ]  Needs Assistance [  x ]  N/A [   ]  ADL: Independent [   ] Requires Assistance [   ] N/A [   ]      LABS:                        8.8    7.48  )-----------( 231      ( 07 Oct 2021 01:10 )             26.9     10-07    135  |  105  |  11  ----------------------------<  122<H>  3.6   |  20  |  <0.5<L>    Ca    6.9<L>      07 Oct 2021 04:30  Phos  2.5     10-07  Mg     2.3     10-07    TPro  6.4  /  Alb  3.7  /  TBili  0.8  /  DBili  x   /  AST  13  /  ALT  17  /  AlkPhos  71  10-05    PT/INR - ( 05 Oct 2021 16:55 )   PT: 14.00 sec;   INR: 1.22 ratio         PTT - ( 05 Oct 2021 16:55 )  PTT:23.2 sec  Urinalysis Basic - ( 06 Oct 2021 19:11 )    Color: Yellow / Appearance: Clear / SG: >1.050 / pH: x  Gluc: x / Ketone: Moderate  / Bili: Negative / Urobili: <2 mg/dL   Blood: x / Protein: 100 mg/dL / Nitrite: Negative   Leuk Esterase: Negative / RBC: 54 /HPF / WBC 9 /HPF   Sq Epi: x / Non Sq Epi: 4 /HPF / Bacteria: Negative        RADIOLOGY & ADDITIONAL STUDIES:

## 2021-10-07 NOTE — PHYSICAL THERAPY INITIAL EVALUATION ADULT - GENERAL OBSERVATIONS, REHAB EVAL
Patient encountered lying in bed (+) NGT with vac, (+) YELITZA drain, (+)PPA on L arm infusing. Agreed to participate in therapy.

## 2021-10-07 NOTE — DISCHARGE NOTE NURSING/CASE MANAGEMENT/SOCIAL WORK - NSDCDMENUMBER_GEN_ALL_CORE_FT
[de-identified] : Patient was seen today in follow-up 3 weeks post major wide resection of metastatic lung carcinoma to the right pelvis and right hip patient underwent major resection and and replacement of the right hip with an  cup and reconstruction and restoration of the acetabulum including a right total hip replacement this was performed through an extended iliofemoral approach at the same time patient having a primary lung carcinoma with multifocal metastatic disease to include also the lower spine. [de-identified] : On examination today patient on a wheelchair and could walk with a walker few steps and having no significant pain with respect to the right hip surgical wound healed nice staples were removed new plain x-ray of the pelvis demonstrate stable well fixed total hip prosthesis.  At this point patient is still in a rehab.  Patient was instructed to gradually a resume level of activity and to be seen again in 3 months for follow-up the same time patient was recommended to continue to be followed and seen by medical oncologist [phone]

## 2021-10-07 NOTE — PHYSICAL THERAPY INITIAL EVALUATION ADULT - GAIT DEVIATIONS NOTED, PT EVAL
slow guarded gait with IV pole follow/increased time in double stance/decreased step length/decreased stride length/increased stride width

## 2021-10-07 NOTE — PHYSICAL THERAPY INITIAL EVALUATION ADULT - PERTINENT HX OF CURRENT PROBLEM, REHAB EVAL
Patient is a 71 year old female with a PMHx of hypothyroidism, HLD, and a hysterectomy in May 2021. Patient came to ED after seeing Dr. Juarez in the office with severe Lower abdominal pain. Patient reports that the pain is associated with nausea and vomiting, and has been going on for the past 4 days. Patient also reports fevers as high as 101. Denies chills, chest pain, shortness of breath, change in bowel function, urinary symptoms, or recent trauma.

## 2021-10-08 LAB
ANION GAP SERPL CALC-SCNC: 11 MMOL/L — SIGNIFICANT CHANGE UP (ref 7–14)
BUN SERPL-MCNC: 9 MG/DL — LOW (ref 10–20)
CALCIUM SERPL-MCNC: 7.3 MG/DL — LOW (ref 8.5–10.1)
CHLORIDE SERPL-SCNC: 107 MMOL/L — SIGNIFICANT CHANGE UP (ref 98–110)
CO2 SERPL-SCNC: 21 MMOL/L — SIGNIFICANT CHANGE UP (ref 17–32)
CREAT SERPL-MCNC: <0.5 MG/DL — LOW (ref 0.7–1.5)
GLUCOSE BLDC GLUCOMTR-MCNC: 116 MG/DL — HIGH (ref 70–99)
GLUCOSE BLDC GLUCOMTR-MCNC: 117 MG/DL — HIGH (ref 70–99)
GLUCOSE BLDC GLUCOMTR-MCNC: 127 MG/DL — HIGH (ref 70–99)
GLUCOSE BLDC GLUCOMTR-MCNC: 137 MG/DL — HIGH (ref 70–99)
GLUCOSE SERPL-MCNC: 116 MG/DL — HIGH (ref 70–99)
MAGNESIUM SERPL-MCNC: 2.2 MG/DL — SIGNIFICANT CHANGE UP (ref 1.8–2.4)
PHOSPHATE SERPL-MCNC: 2.8 MG/DL — SIGNIFICANT CHANGE UP (ref 2.1–4.9)
POTASSIUM SERPL-MCNC: 4.1 MMOL/L — SIGNIFICANT CHANGE UP (ref 3.5–5)
POTASSIUM SERPL-SCNC: 4.1 MMOL/L — SIGNIFICANT CHANGE UP (ref 3.5–5)
SODIUM SERPL-SCNC: 139 MMOL/L — SIGNIFICANT CHANGE UP (ref 135–146)

## 2021-10-08 RX ORDER — I.V. FAT EMULSION 20 G/100ML
2.12 EMULSION INTRAVENOUS
Qty: 100.06 | Refills: 0 | Status: DISCONTINUED | OUTPATIENT
Start: 2021-10-08 | End: 2021-10-08

## 2021-10-08 RX ORDER — ELECTROLYTE SOLUTION,INJ
1 VIAL (ML) INTRAVENOUS
Refills: 0 | Status: DISCONTINUED | OUTPATIENT
Start: 2021-10-08 | End: 2021-10-08

## 2021-10-08 RX ORDER — CEFTRIAXONE 500 MG/1
2 INJECTION, POWDER, FOR SOLUTION INTRAMUSCULAR; INTRAVENOUS EVERY 24 HOURS
Refills: 0 | Status: DISCONTINUED | OUTPATIENT
Start: 2021-10-08 | End: 2021-10-08

## 2021-10-08 RX ORDER — CEFTRIAXONE 500 MG/1
2000 INJECTION, POWDER, FOR SOLUTION INTRAMUSCULAR; INTRAVENOUS EVERY 24 HOURS
Refills: 0 | Status: DISCONTINUED | OUTPATIENT
Start: 2021-10-08 | End: 2021-10-14

## 2021-10-08 RX ORDER — METRONIDAZOLE 500 MG
500 TABLET ORAL EVERY 8 HOURS
Refills: 0 | Status: DISCONTINUED | OUTPATIENT
Start: 2021-10-08 | End: 2021-10-14

## 2021-10-08 RX ADMIN — Medication 25 MICROGRAM(S): at 21:58

## 2021-10-08 RX ADMIN — PIPERACILLIN AND TAZOBACTAM 25 GRAM(S): 4; .5 INJECTION, POWDER, LYOPHILIZED, FOR SOLUTION INTRAVENOUS at 05:37

## 2021-10-08 RX ADMIN — Medication 15 MILLIGRAM(S): at 05:37

## 2021-10-08 RX ADMIN — Medication 1 EACH: at 20:45

## 2021-10-08 RX ADMIN — Medication 15 MILLIGRAM(S): at 17:20

## 2021-10-08 RX ADMIN — GABAPENTIN 100 MILLIGRAM(S): 400 CAPSULE ORAL at 22:23

## 2021-10-08 RX ADMIN — PANTOPRAZOLE SODIUM 40 MILLIGRAM(S): 20 TABLET, DELAYED RELEASE ORAL at 11:18

## 2021-10-08 RX ADMIN — Medication 15 MILLIGRAM(S): at 23:16

## 2021-10-08 RX ADMIN — Medication 650 MILLIGRAM(S): at 17:20

## 2021-10-08 RX ADMIN — I.V. FAT EMULSION 31.3 GM/KG/DAY: 20 EMULSION INTRAVENOUS at 20:45

## 2021-10-08 RX ADMIN — Medication 100 MILLIGRAM(S): at 21:58

## 2021-10-08 RX ADMIN — CHLORHEXIDINE GLUCONATE 1 APPLICATION(S): 213 SOLUTION TOPICAL at 05:38

## 2021-10-08 RX ADMIN — Medication 650 MILLIGRAM(S): at 12:26

## 2021-10-08 RX ADMIN — Medication 650 MILLIGRAM(S): at 11:18

## 2021-10-08 RX ADMIN — GABAPENTIN 100 MILLIGRAM(S): 400 CAPSULE ORAL at 14:27

## 2021-10-08 RX ADMIN — ENOXAPARIN SODIUM 40 MILLIGRAM(S): 100 INJECTION SUBCUTANEOUS at 11:29

## 2021-10-08 RX ADMIN — Medication 650 MILLIGRAM(S): at 23:16

## 2021-10-08 RX ADMIN — Medication 15 MILLIGRAM(S): at 11:18

## 2021-10-08 RX ADMIN — Medication 15 MILLIGRAM(S): at 00:04

## 2021-10-08 RX ADMIN — Medication 85 MILLIMOLE(S): at 01:11

## 2021-10-08 RX ADMIN — Medication 15 MILLIGRAM(S): at 18:41

## 2021-10-08 RX ADMIN — Medication 15 MILLIGRAM(S): at 12:26

## 2021-10-08 RX ADMIN — PIPERACILLIN AND TAZOBACTAM 25 GRAM(S): 4; .5 INJECTION, POWDER, LYOPHILIZED, FOR SOLUTION INTRAVENOUS at 14:27

## 2021-10-08 NOTE — CONSULT NOTE ADULT - ASSESSMENT
ASSESSMENT:  71yF w/ PMHx of hypothyroidism, HLD, and a hysterectomy in May 2021, followed by diagnosis of ischemic colitis in July who presented with severe abdominal pain from Dr. Juarez's office, improved with morphine. Mildly tachycardiac and febrile. WBC 11.42. Physical exam findings, imaging, and labs as documented above.     PLAN:  - F/U CT  - IV Fluid resuscitation  - Tylenol  - Pain control    Above plan discussed with Attending Surgeon Dr. Juarez, patient, patient family, and Primary team  10-05-21 @ 18:42
IMPRESSION: Rehab of gait dysfunction / s/p colostomy    PRECAUTIONS: [   ] Cardiac  [   ] Respiratory  [   ] Seizures [   ] Contact Isolation  [   ] Droplet Isolation  [   ] Other    Weight Bearing Status:     RECOMMENDATION:    Out of Bed to Chair     DVT/Decubiti Prophylaxis    REHAB PLAN:     [  x  ] Bedside P/T 3-5 times a week   [    ]   Bedside O/T  2-3 times a week             [    ] Speech Therapy               [    ]  No Rehab Therapy Indicated   Conditioning/ROM                                    ADL  Bed Mobility                                               Conditioning/ROM  Transfers                                                     Bed Mobility  Sitting /Standing Balance                         Transfers                                        Gait Training                                               Sitting/Standing Balance  Stair Training [   ]Applicable                    Home equipment Eval                                                                        Splinting  [   ] Only      GOALS:   ADL   [    ]   Independent                    Transfers  [   x ] Independent                          Ambulation  [ x   ] Independent     [  x   ] With device                            [    ]  CG                                                         [    ]  CG                                                                  [    ] CG                            [    ] Min A                                                   [    ] Min A                                                              [    ] Min  A          DISCHARGE PLAN:   [    ]  Good candidate for Intensive Rehabilitation/Hospital based                                             Will tolerate 3hrs Intensive Rehab Daily                                       [     ]  Short Term Rehab in Skilled Nursing Facility                                       [   x  ]  Home with Outpatient or  services                                         [     ]  Possible Candidate for Intensive Hospital based Rehab                                       
`Assessment & Plan    71y Female  s/p Ex-lap ingram and appendectomy    NEURO:    Acute pain-controlled with dilaudid (0.2mg /6 min) PCA and tylenol IV PRN    RESP:   Extubated    10-06 @ 03:49 -- 7.35 / 34 / 121 / 19 / 96.4        SAT  96.4  Lac 0.90   Home Rx: Xyzal 5 mg oral tablet        CARDS:   h/o HLD  Keep normotensive  f/u ECHO  Trop neg x1    GI/NUTR:     Diet, NPO    GI Prophylaxis-    Bowel regimen- holding  pantoprazole  Injectable 40 milliGRAM(s) IV Push daily      /RENAL:        Monitor UO-ward in place    Current Rx:     Labs:          BUN/Cr- 22/0.6  -->,  13/<0.5  -->          Electrolytes-Na 133 // K 3.7 // Mg 1.4 //  Phos 3.0 (10-06 @ 04:00)      HEME/ONC:       DVT prophylaxis-enoxaparin Injectable  , SCDs    Labs: Hb/Hct:  11.2/33.6  -->,  10.5/32.0  -->                      Plts:  326  -->,  277  -->                 PTT/INR:  23.2/1.22  --->       Home Rx:       ID:  WBC- 11.42  --->>,  11.56  --->>  Temp trend- 24hrs T(F): 98.4 (10-06 @ 03:10), Max: 101.4 (10-05 @ 16:20)  Antibiotics-piperacillin/tazobactam IVPB.. 3.375 Once  piperacillin/tazobactam IVPB... 3.375 once    Cultures:       ENDO:     Glucose, Serum: 121 (10-06 @ 04:00)      HA1C     LINES/DRAINS:  PIV, Gracie, Ward     DISPO:    SICU
71y F s/p Gordon's and appendectomy    IMPRESSION;  peritonitis secondary to perforated sigmoid  10/6 s/p Gordon's and appendectomy, colostomy  10/6 BCx NG  10/6 OR cultures E coli    RECOMMENDATIONS;  Rocephin 2 gm iv q24h  Flagyl 500 mg q8h  On discharge change iv to po Vantin 200 mg q12h and po flagyl 500 mg q8h till 10/20  recall prn please

## 2021-10-08 NOTE — CHART NOTE - NSCHARTNOTEFT_GEN_A_CORE
T(F): 99 (10-08-21 @ 04:30), Max: 99.2 (10-08-21 @ 00:30)  HR: 88 (10-08-21 @ 04:30) (88 - 94)  BP: 116/76 (10-08-21 @ 04:30) (99/67 - 116/76)  RR: 18 (10-08-21 @ 04:30) (18 - 18)  SpO2: 93% (10-08-21 @ 04:30) (90% - 96%)    I&O's Detail    07 Oct 2021 07:01  -  08 Oct 2021 07:00  --------------------------------------------------------  IN:    IV PiggyBack: 600 mL  Total IN: 600 mL    OUT:    Colostomy (mL): 90 mL    Drain (mL): 35 mL    Indwelling Catheter - Urethral (mL): 1650 mL    Other (mL): 100 mL    Voided (mL): 2000 mL  Total OUT: 3875 mL    Total NET: -3275 mL    10-07    135  |  104  |  10  ----------------------------<  109<H>  4.4   |  18  |  <0.5<L>    Ca    7.4<L>      07 Oct 2021 22:52  Phos  1.1     10-07  Mg     2.2     10-07                        9.6    6.55  )-----------( 261      ( 07 Oct 2021 22:52 )             29.6     CAPILLARY BLOOD GLUCOSE  POCT Blood Glucose.: 127 mg/dL (08 Oct 2021 06:04)  POCT Blood Glucose.: 116 mg/dL (08 Oct 2021 00:06)  POCT Blood Glucose.: 102 mg/dL (07 Oct 2021 17:51)  POCT Blood Glucose.: 120 mg/dL (07 Oct 2021 12:05)    Diet, Clear Liquid (10-08-21 @ 08:28)    ASSESSMENT  72 y/o female with Stercoral Perforation of Sigmoid Colon  Diffuse peritonitis  S/P Darrell's Procedure    PLAN  - cont PPN tonight  - daily bmp, in phos, mg while on parenteral nutrition  - will follow T(F): 99 (10-08-21 @ 04:30), Max: 99.2 (10-08-21 @ 00:30)  HR: 88 (10-08-21 @ 04:30) (88 - 94)  BP: 116/76 (10-08-21 @ 04:30) (99/67 - 116/76)  RR: 18 (10-08-21 @ 04:30) (18 - 18)  SpO2: 93% (10-08-21 @ 04:30) (90% - 96%)  pt alert, verbal, no SOB  remains NPO  colostomy + small volume liquid light brown stool output  pt reports passing flatus rectally, which surprised her  she reports + session with stoma nurse    I&O's Detail    07 Oct 2021 07:01  -  08 Oct 2021 07:00  --------------------------------------------------------  IN:    IV PiggyBack: 600 mL  Total IN: 600 mL    OUT:    Colostomy (mL): 90 mL    Drain (mL): 35 mL    Indwelling Catheter - Urethral (mL): 1650 mL    Other (mL): 100 mL    Voided (mL): 2000 mL  Total OUT: 3875 mL    Total NET: -3275 mL    10-07    135  |  104  |  10  ----------------------------<  109<H>  4.4   |  18  |  <0.5<L>    Ca    7.4<L>      07 Oct 2021 22:52  Phos  1.1     10-07  Mg     2.2     10-07                        9.6    6.55  )-----------( 261      ( 07 Oct 2021 22:52 )             29.6     CAPILLARY BLOOD GLUCOSE  POCT Blood Glucose.: 127 mg/dL (08 Oct 2021 06:04)  POCT Blood Glucose.: 116 mg/dL (08 Oct 2021 00:06)  POCT Blood Glucose.: 102 mg/dL (07 Oct 2021 17:51)  POCT Blood Glucose.: 120 mg/dL (07 Oct 2021 12:05)    Diet, Clear Liquid (10-08-21 @ 08:28)    ASSESSMENT  72 y/o female with Stercoral Perforation of Sigmoid Colon  Diffuse peritonitis  S/P Darrell's Procedure    PLAN  - cont PPN tonight  - daily bmp, in phos, mg while on parenteral nutrition  - phos aggressively repleted  - will follow

## 2021-10-08 NOTE — CONSULT NOTE ADULT - SUBJECTIVE AND OBJECTIVE BOX
PRIYANKA DOHERTY  71y, Female  Allergy: No Known Allergies      All historical available data reviewed.    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 Juarez in the office with severe Lower abdominal pain. Patient reports that the pain is associated with nausea and vomiting, and has been going on for the past 4 days. Patient also reports fevers as high as 101. Denies chills, chest pain, shortness of breath, change in bowel function, urinary symptoms, or recent trauma.  (05 Oct 2021 23:47)    ·  POST-OP DIAGNOSIS:  Stercoral ulcer of large intestine 06-Oct-2021 03:13:43  Beau Teran  Perforated sigmoid colon 06-Oct-2021 03:13:54  Beau Teran.  ·  PROCEDURES:  Exploratory celiotomy 06-Oct-2021 03:12:14  Beau Teran  Darrell colectomy 06-Oct-2021 03:12:25  Beau Teran  Open appendectomy     FAMILY HISTORY:    PAST MEDICAL & SURGICAL HISTORY:  Hypothyroidism    HLD (hyperlipidemia)    S/P hysterectomy    H/O hernia repair          VITALS:  T(F): 98.4, Max: 99.2 (10-08-21 @ 00:30)  HR: 84  BP: 111/71  RR: 18Vital Signs Last 24 Hrs  T(C): 36.9 (08 Oct 2021 13:58), Max: 37.3 (08 Oct 2021 00:30)  T(F): 98.4 (08 Oct 2021 13:58), Max: 99.2 (08 Oct 2021 00:30)  HR: 84 (08 Oct 2021 13:58) (78 - 94)  BP: 111/71 (08 Oct 2021 13:58) (107/65 - 116/76)  BP(mean): --  RR: 18 (08 Oct 2021 13:58) (18 - 18)  SpO2: 96% (08 Oct 2021 13:58) (93% - 96%)    TESTS & MEASUREMENTS:                        9.6    6.55  )-----------( 261      ( 07 Oct 2021 22:52 )             29.6     10-08    139  |  107  |  9<L>  ----------------------------<  116<H>  4.1   |  21  |  <0.5<L>    Ca    7.3<L>      08 Oct 2021 04:30  Phos  2.8     10-08  Mg     2.2     10-08          Culture - Blood (collected 10-06-21 @ 19:01)  Source: .Blood Blood-Arterial  Preliminary Report (10-08-21 @ 01:02):    No growth to date.    Culture - Acid Fast - Tissue w/Smear (collected 10-06-21 @ 02:00)  Source: .Tissue None    Culture - Tissue with Gram Stain (collected 10-06-21 @ 02:00)  Source: .Tissue None  Gram Stain (10-06-21 @ 14:27):    No polymorphonuclear leukocytes seen per low power field    No organisms seen per oil power field  Final Report (10-08-21 @ 15:04):    Few Escherichia coli    Few Escherichia coli #2 Multiple Morphological Strains    Moderate Bacteroides ovatus group "Susceptibilities not performed"  Organism: Escherichia coli  Escherichia coli (10-08-21 @ 15:04)  Organism: Escherichia coli (10-08-21 @ 15:04)      -  Amikacin: S <=16      -  Amoxicillin/Clavulanic Acid: S <=8/4      -  Ampicillin: R >16 These ampicillin results predict results for amoxicillin      -  Ampicillin/Sulbactam: I 16/8 Enterobacter, Citrobacter, and Serratia may develop resistance during prolonged therapy (3-4 days)      -  Aztreonam: S <=4      -  Cefazolin: I 4 Enterobacter, Citrobacter, and Serratia may develop resistance during prolonged therapy (3-4 days)      -  Cefepime: S <=2      -  Cefoxitin: S <=8      -  Ceftriaxone: S <=1 Enterobacter, Citrobacter, and Serratia may develop resistance during prolonged therapy      -  Ciprofloxacin: I 0.5      -  Ertapenem: S <=0.5      -  Gentamicin: R >8      -  Imipenem: S <=1      -  Levofloxacin: S <=0.5      -  Meropenem: S <=1      -  Piperacillin/Tazobactam: S <=8      -  Tobramycin: I 8      -  Trimethoprim/Sulfamethoxazole: S <=0.5/9.5      Method Type: NORA  Organism: Escherichia coli (10-08-21 @ 15:04)      -  Amikacin: S <=16      -  Amoxicillin/Clavulanic Acid: S <=8/4      -  Ampicillin: R >16 These ampicillin results predict results for amoxicillin      -  Ampicillin/Sulbactam: I 16/8 Enterobacter, Citrobacter, and Serratia may develop resistance during prolonged therapy (3-4 days)      -  Aztreonam: S <=4      -  Cefazolin: I 4 Enterobacter, Citrobacter, and Serratia may develop resistance during prolonged therapy (3-4 days)      -  Cefepime: S <=2      -  Cefoxitin: S <=8      -  Ceftriaxone: S <=1 Enterobacter, Citrobacter, and Serratia may develop resistance during prolonged therapy      -  Ciprofloxacin: I 0.5      -  Ertapenem: S <=0.5      -  Gentamicin: R >8      -  Imipenem: S <=1      -  Levofloxacin: S <=0.5      -  Meropenem: S <=1      -  Piperacillin/Tazobactam: S <=8      -  Tobramycin: R >8      -  Trimethoprim/Sulfamethoxazole: S <=0.5/9.5      Method Type: NORA    Culture - Acid Fast - Tissue w/Smear (collected 10-06-21 @ 02:00)  Source: .Tissue None    Culture - Tissue with Gram Stain (collected 10-06-21 @ 02:00)  Source: .Tissue None  Gram Stain (10-06-21 @ 14:02):    Moderate polymorphonuclear leukocytes seen per low power field    No organisms seen per oil power field  Preliminary Report (10-08-21 @ 15:13):    Moderate Escherichia coli  Organism: Escherichia coli (10-08-21 @ 15:12)  Organism: Escherichia coli (10-08-21 @ 15:12)      -  Amikacin: S <=16      -  Amoxicillin/Clavulanic Acid: I 16/8      -  Ampicillin: R >16 These ampicillin results predict results for amoxicillin      -  Ampicillin/Sulbactam: I 16/8 Enterobacter, Citrobacter, and Serratia may develop resistance during prolonged therapy (3-4 days)      -  Aztreonam: S <=4      -  Cefazolin: S <=2 Enterobacter, Citrobacter, and Serratia may develop resistance during prolonged therapy (3-4 days)      -  Cefepime: S <=2      -  Cefoxitin: S <=8      -  Ceftriaxone: S <=1 Enterobacter, Citrobacter, and Serratia may develop resistance during prolonged therapy      -  Ciprofloxacin: I 0.5      -  Ertapenem: S <=0.5      -  Gentamicin: R >8      -  Imipenem: S <=1      -  Levofloxacin: S <=0.5      -  Meropenem: S <=1      -  Piperacillin/Tazobactam: S <=8      -  Tobramycin: I 8      -  Trimethoprim/Sulfamethoxazole: S <=0.5/9.5      Method Type: NORA    Culture - Blood (collected 10-05-21 @ 16:44)  Source: .Blood Blood-Peripheral  Preliminary Report (10-06-21 @ 22:01):    No growth to date.    Culture - Blood (collected 10-05-21 @ 16:44)  Source: .Blood Blood-Peripheral  Preliminary Report (10-06-21 @ 22:01):    No growth to date.      Urinalysis Basic - ( 06 Oct 2021 19:11 )    Color: Yellow / Appearance: Clear / SG: >1.050 / pH: x  Gluc: x / Ketone: Moderate  / Bili: Negative / Urobili: <2 mg/dL   Blood: x / Protein: 100 mg/dL / Nitrite: Negative   Leuk Esterase: Negative / RBC: 54 /HPF / WBC 9 /HPF   Sq Epi: x / Non Sq Epi: 4 /HPF / Bacteria: Negative          RADIOLOGY & ADDITIONAL TESTS:  Personal review of radiological diagnostics performed  Echo and EKG results noted when applicable.     MEDICATIONS:  acetaminophen   Tablet .. 650 milliGRAM(s) Oral every 6 hours  benzocaine 20% Spray 1 Spray(s) Topical every 4 hours PRN  chlorhexidine 4% Liquid 1 Application(s) Topical <User Schedule>  enoxaparin Injectable 40 milliGRAM(s) SubCutaneous every 24 hours  fat emulsion (Plant Based) 20% Infusion 2.12 Gm/kG/Day IV Continuous <Continuous>  fat emulsion (Plant Based) 20% Infusion 2.12 Gm/kG/Day IV Continuous <Continuous>  gabapentin Solution 100 milliGRAM(s) Oral three times a day  ketorolac   Injectable 15 milliGRAM(s) IV Push every 6 hours  levothyroxine Injectable 25 MICROGram(s) IV Push at bedtime  methocarbamol 500 milliGRAM(s) Oral four times a day PRN  naloxone Injectable 0.1 milliGRAM(s) IV Push every 3 minutes PRN  ondansetron Injectable 4 milliGRAM(s) IV Push once  oxyCODONE    IR 5 milliGRAM(s) Oral every 8 hours PRN  pantoprazole  Injectable 40 milliGRAM(s) IV Push daily  Parenteral Nutrition - Adult 1 Each TPN Continuous <Continuous>  Parenteral Nutrition - Adult 1 Each TPN Continuous <Continuous>  piperacillin/tazobactam IVPB.. 3.375 Gram(s) IV Intermittent every 8 hours      ANTIBIOTICS:  piperacillin/tazobactam IVPB.. 3.375 Gram(s) IV Intermittent every 8 hours

## 2021-10-09 LAB
ANION GAP SERPL CALC-SCNC: 9 MMOL/L — SIGNIFICANT CHANGE UP (ref 7–14)
BASOPHILS # BLD AUTO: 0.01 K/UL — SIGNIFICANT CHANGE UP (ref 0–0.2)
BASOPHILS NFR BLD AUTO: 0.2 % — SIGNIFICANT CHANGE UP (ref 0–1)
BUN SERPL-MCNC: 10 MG/DL — SIGNIFICANT CHANGE UP (ref 10–20)
CALCIUM SERPL-MCNC: 7.6 MG/DL — LOW (ref 8.5–10.1)
CHLORIDE SERPL-SCNC: 109 MMOL/L — SIGNIFICANT CHANGE UP (ref 98–110)
CO2 SERPL-SCNC: 19 MMOL/L — SIGNIFICANT CHANGE UP (ref 17–32)
CREAT SERPL-MCNC: <0.5 MG/DL — LOW (ref 0.7–1.5)
EOSINOPHIL # BLD AUTO: 0.27 K/UL — SIGNIFICANT CHANGE UP (ref 0–0.7)
EOSINOPHIL NFR BLD AUTO: 5.2 % — SIGNIFICANT CHANGE UP (ref 0–8)
GLUCOSE BLDC GLUCOMTR-MCNC: 111 MG/DL — HIGH (ref 70–99)
GLUCOSE BLDC GLUCOMTR-MCNC: 117 MG/DL — HIGH (ref 70–99)
GLUCOSE BLDC GLUCOMTR-MCNC: 123 MG/DL — HIGH (ref 70–99)
GLUCOSE BLDC GLUCOMTR-MCNC: 123 MG/DL — HIGH (ref 70–99)
GLUCOSE SERPL-MCNC: 118 MG/DL — HIGH (ref 70–99)
HCT VFR BLD CALC: 25 % — LOW (ref 37–47)
HGB BLD-MCNC: 8.3 G/DL — LOW (ref 12–16)
IMM GRANULOCYTES NFR BLD AUTO: 0.4 % — HIGH (ref 0.1–0.3)
LYMPHOCYTES # BLD AUTO: 0.87 K/UL — LOW (ref 1.2–3.4)
LYMPHOCYTES # BLD AUTO: 16.8 % — LOW (ref 20.5–51.1)
MAGNESIUM SERPL-MCNC: 2.1 MG/DL — SIGNIFICANT CHANGE UP (ref 1.8–2.4)
MCHC RBC-ENTMCNC: 29.5 PG — SIGNIFICANT CHANGE UP (ref 27–31)
MCHC RBC-ENTMCNC: 33.2 G/DL — SIGNIFICANT CHANGE UP (ref 32–37)
MCV RBC AUTO: 89 FL — SIGNIFICANT CHANGE UP (ref 81–99)
MONOCYTES # BLD AUTO: 0.43 K/UL — SIGNIFICANT CHANGE UP (ref 0.1–0.6)
MONOCYTES NFR BLD AUTO: 8.3 % — SIGNIFICANT CHANGE UP (ref 1.7–9.3)
NEUTROPHILS # BLD AUTO: 3.59 K/UL — SIGNIFICANT CHANGE UP (ref 1.4–6.5)
NEUTROPHILS NFR BLD AUTO: 69.1 % — SIGNIFICANT CHANGE UP (ref 42.2–75.2)
NRBC # BLD: 0 /100 WBCS — SIGNIFICANT CHANGE UP (ref 0–0)
PHOSPHATE SERPL-MCNC: 2.3 MG/DL — SIGNIFICANT CHANGE UP (ref 2.1–4.9)
PLATELET # BLD AUTO: 197 K/UL — SIGNIFICANT CHANGE UP (ref 130–400)
POTASSIUM SERPL-MCNC: 4.3 MMOL/L — SIGNIFICANT CHANGE UP (ref 3.5–5)
POTASSIUM SERPL-SCNC: 4.3 MMOL/L — SIGNIFICANT CHANGE UP (ref 3.5–5)
RBC # BLD: 2.81 M/UL — LOW (ref 4.2–5.4)
RBC # FLD: 13.8 % — SIGNIFICANT CHANGE UP (ref 11.5–14.5)
SODIUM SERPL-SCNC: 137 MMOL/L — SIGNIFICANT CHANGE UP (ref 135–146)
WBC # BLD: 5.19 K/UL — SIGNIFICANT CHANGE UP (ref 4.8–10.8)
WBC # FLD AUTO: 5.19 K/UL — SIGNIFICANT CHANGE UP (ref 4.8–10.8)

## 2021-10-09 RX ORDER — ELECTROLYTE SOLUTION,INJ
1 VIAL (ML) INTRAVENOUS
Refills: 0 | Status: DISCONTINUED | OUTPATIENT
Start: 2021-10-09 | End: 2021-10-09

## 2021-10-09 RX ORDER — I.V. FAT EMULSION 20 G/100ML
2.12 EMULSION INTRAVENOUS
Qty: 100.06 | Refills: 0 | Status: DISCONTINUED | OUTPATIENT
Start: 2021-10-09 | End: 2021-10-09

## 2021-10-09 RX ADMIN — Medication 15 MILLIGRAM(S): at 17:01

## 2021-10-09 RX ADMIN — Medication 100 MILLIGRAM(S): at 21:27

## 2021-10-09 RX ADMIN — Medication 15 MILLIGRAM(S): at 23:13

## 2021-10-09 RX ADMIN — Medication 15 MILLIGRAM(S): at 11:27

## 2021-10-09 RX ADMIN — GABAPENTIN 100 MILLIGRAM(S): 400 CAPSULE ORAL at 16:15

## 2021-10-09 RX ADMIN — I.V. FAT EMULSION 31.3 GM/KG/DAY: 20 EMULSION INTRAVENOUS at 21:09

## 2021-10-09 RX ADMIN — CHLORHEXIDINE GLUCONATE 1 APPLICATION(S): 213 SOLUTION TOPICAL at 06:15

## 2021-10-09 RX ADMIN — Medication 15 MILLIGRAM(S): at 05:20

## 2021-10-09 RX ADMIN — Medication 650 MILLIGRAM(S): at 05:21

## 2021-10-09 RX ADMIN — GABAPENTIN 100 MILLIGRAM(S): 400 CAPSULE ORAL at 21:10

## 2021-10-09 RX ADMIN — PANTOPRAZOLE SODIUM 40 MILLIGRAM(S): 20 TABLET, DELAYED RELEASE ORAL at 11:27

## 2021-10-09 RX ADMIN — ONDANSETRON 4 MILLIGRAM(S): 8 TABLET, FILM COATED ORAL at 21:10

## 2021-10-09 RX ADMIN — Medication 100 MILLIGRAM(S): at 13:39

## 2021-10-09 RX ADMIN — GABAPENTIN 100 MILLIGRAM(S): 400 CAPSULE ORAL at 06:15

## 2021-10-09 RX ADMIN — OXYCODONE HYDROCHLORIDE 5 MILLIGRAM(S): 5 TABLET ORAL at 21:27

## 2021-10-09 RX ADMIN — Medication 100 MILLIGRAM(S): at 05:20

## 2021-10-09 RX ADMIN — Medication 25 MICROGRAM(S): at 21:10

## 2021-10-09 RX ADMIN — Medication 650 MILLIGRAM(S): at 17:01

## 2021-10-09 RX ADMIN — ENOXAPARIN SODIUM 40 MILLIGRAM(S): 100 INJECTION SUBCUTANEOUS at 11:28

## 2021-10-09 RX ADMIN — Medication 650 MILLIGRAM(S): at 23:13

## 2021-10-09 RX ADMIN — Medication 1 EACH: at 21:10

## 2021-10-09 RX ADMIN — CEFTRIAXONE 100 MILLIGRAM(S): 500 INJECTION, POWDER, FOR SOLUTION INTRAMUSCULAR; INTRAVENOUS at 10:26

## 2021-10-09 RX ADMIN — Medication 650 MILLIGRAM(S): at 11:27

## 2021-10-10 LAB
ANION GAP SERPL CALC-SCNC: 12 MMOL/L — SIGNIFICANT CHANGE UP (ref 7–14)
BASOPHILS # BLD AUTO: 0.02 K/UL — SIGNIFICANT CHANGE UP (ref 0–0.2)
BASOPHILS NFR BLD AUTO: 0.3 % — SIGNIFICANT CHANGE UP (ref 0–1)
BUN SERPL-MCNC: 11 MG/DL — SIGNIFICANT CHANGE UP (ref 10–20)
CALCIUM SERPL-MCNC: 7.8 MG/DL — LOW (ref 8.5–10.1)
CHLORIDE SERPL-SCNC: 106 MMOL/L — SIGNIFICANT CHANGE UP (ref 98–110)
CO2 SERPL-SCNC: 19 MMOL/L — SIGNIFICANT CHANGE UP (ref 17–32)
CREAT SERPL-MCNC: <0.5 MG/DL — LOW (ref 0.7–1.5)
CULTURE RESULTS: SIGNIFICANT CHANGE UP
CULTURE RESULTS: SIGNIFICANT CHANGE UP
EOSINOPHIL # BLD AUTO: 0.37 K/UL — SIGNIFICANT CHANGE UP (ref 0–0.7)
EOSINOPHIL NFR BLD AUTO: 5.6 % — SIGNIFICANT CHANGE UP (ref 0–8)
GLUCOSE SERPL-MCNC: 116 MG/DL — HIGH (ref 70–99)
HCT VFR BLD CALC: 27 % — LOW (ref 37–47)
HGB BLD-MCNC: 8.8 G/DL — LOW (ref 12–16)
IMM GRANULOCYTES NFR BLD AUTO: 0.6 % — HIGH (ref 0.1–0.3)
LYMPHOCYTES # BLD AUTO: 1.18 K/UL — LOW (ref 1.2–3.4)
LYMPHOCYTES # BLD AUTO: 17.8 % — LOW (ref 20.5–51.1)
MAGNESIUM SERPL-MCNC: 1.9 MG/DL — SIGNIFICANT CHANGE UP (ref 1.8–2.4)
MCHC RBC-ENTMCNC: 29 PG — SIGNIFICANT CHANGE UP (ref 27–31)
MCHC RBC-ENTMCNC: 32.6 G/DL — SIGNIFICANT CHANGE UP (ref 32–37)
MCV RBC AUTO: 89.1 FL — SIGNIFICANT CHANGE UP (ref 81–99)
MONOCYTES # BLD AUTO: 0.64 K/UL — HIGH (ref 0.1–0.6)
MONOCYTES NFR BLD AUTO: 9.7 % — HIGH (ref 1.7–9.3)
NEUTROPHILS # BLD AUTO: 4.37 K/UL — SIGNIFICANT CHANGE UP (ref 1.4–6.5)
NEUTROPHILS NFR BLD AUTO: 66 % — SIGNIFICANT CHANGE UP (ref 42.2–75.2)
NRBC # BLD: 0 /100 WBCS — SIGNIFICANT CHANGE UP (ref 0–0)
PHOSPHATE SERPL-MCNC: 3.5 MG/DL — SIGNIFICANT CHANGE UP (ref 2.1–4.9)
PLATELET # BLD AUTO: 237 K/UL — SIGNIFICANT CHANGE UP (ref 130–400)
POTASSIUM SERPL-MCNC: 4.2 MMOL/L — SIGNIFICANT CHANGE UP (ref 3.5–5)
POTASSIUM SERPL-SCNC: 4.2 MMOL/L — SIGNIFICANT CHANGE UP (ref 3.5–5)
RBC # BLD: 3.03 M/UL — LOW (ref 4.2–5.4)
RBC # FLD: 13.8 % — SIGNIFICANT CHANGE UP (ref 11.5–14.5)
SODIUM SERPL-SCNC: 137 MMOL/L — SIGNIFICANT CHANGE UP (ref 135–146)
SPECIMEN SOURCE: SIGNIFICANT CHANGE UP
SPECIMEN SOURCE: SIGNIFICANT CHANGE UP
WBC # BLD: 6.62 K/UL — SIGNIFICANT CHANGE UP (ref 4.8–10.8)
WBC # FLD AUTO: 6.62 K/UL — SIGNIFICANT CHANGE UP (ref 4.8–10.8)

## 2021-10-10 RX ORDER — I.V. FAT EMULSION 20 G/100ML
2.12 EMULSION INTRAVENOUS
Qty: 100.06 | Refills: 0 | Status: DISCONTINUED | OUTPATIENT
Start: 2021-10-10 | End: 2021-10-10

## 2021-10-10 RX ORDER — ELECTROLYTE SOLUTION,INJ
1 VIAL (ML) INTRAVENOUS
Refills: 0 | Status: DISCONTINUED | OUTPATIENT
Start: 2021-10-10 | End: 2021-10-10

## 2021-10-10 RX ADMIN — PANTOPRAZOLE SODIUM 40 MILLIGRAM(S): 20 TABLET, DELAYED RELEASE ORAL at 12:00

## 2021-10-10 RX ADMIN — GABAPENTIN 100 MILLIGRAM(S): 400 CAPSULE ORAL at 14:48

## 2021-10-10 RX ADMIN — Medication 650 MILLIGRAM(S): at 23:04

## 2021-10-10 RX ADMIN — Medication 100 MILLIGRAM(S): at 21:05

## 2021-10-10 RX ADMIN — I.V. FAT EMULSION 31.3 GM/KG/DAY: 20 EMULSION INTRAVENOUS at 21:05

## 2021-10-10 RX ADMIN — Medication 15 MILLIGRAM(S): at 17:22

## 2021-10-10 RX ADMIN — Medication 650 MILLIGRAM(S): at 17:17

## 2021-10-10 RX ADMIN — OXYCODONE HYDROCHLORIDE 5 MILLIGRAM(S): 5 TABLET ORAL at 10:23

## 2021-10-10 RX ADMIN — GABAPENTIN 100 MILLIGRAM(S): 400 CAPSULE ORAL at 05:31

## 2021-10-10 RX ADMIN — CHLORHEXIDINE GLUCONATE 1 APPLICATION(S): 213 SOLUTION TOPICAL at 05:33

## 2021-10-10 RX ADMIN — Medication 100 MILLIGRAM(S): at 05:33

## 2021-10-10 RX ADMIN — Medication 15 MILLIGRAM(S): at 12:01

## 2021-10-10 RX ADMIN — Medication 650 MILLIGRAM(S): at 05:31

## 2021-10-10 RX ADMIN — Medication 650 MILLIGRAM(S): at 12:02

## 2021-10-10 RX ADMIN — Medication 100 MILLIGRAM(S): at 14:50

## 2021-10-10 RX ADMIN — Medication 15 MILLIGRAM(S): at 23:03

## 2021-10-10 RX ADMIN — Medication 1 EACH: at 21:05

## 2021-10-10 RX ADMIN — ENOXAPARIN SODIUM 40 MILLIGRAM(S): 100 INJECTION SUBCUTANEOUS at 12:03

## 2021-10-10 RX ADMIN — GABAPENTIN 100 MILLIGRAM(S): 400 CAPSULE ORAL at 21:05

## 2021-10-10 RX ADMIN — Medication 25 MICROGRAM(S): at 21:06

## 2021-10-10 RX ADMIN — CEFTRIAXONE 100 MILLIGRAM(S): 500 INJECTION, POWDER, FOR SOLUTION INTRAMUSCULAR; INTRAVENOUS at 11:59

## 2021-10-10 RX ADMIN — OXYCODONE HYDROCHLORIDE 5 MILLIGRAM(S): 5 TABLET ORAL at 11:15

## 2021-10-10 RX ADMIN — Medication 15 MILLIGRAM(S): at 05:32

## 2021-10-10 NOTE — CHART NOTE - NSCHARTNOTEFT_GEN_A_CORE
Wound Care Instructions:  Daily dressing/packing changes for midline incision wound  Remove packing and flush wound with normal saline  Re-pack with 1/4 inch gauze  Cover with gauze dressing

## 2021-10-11 LAB
ANION GAP SERPL CALC-SCNC: 10 MMOL/L — SIGNIFICANT CHANGE UP (ref 7–14)
BASOPHILS # BLD AUTO: 0.03 K/UL — SIGNIFICANT CHANGE UP (ref 0–0.2)
BASOPHILS NFR BLD AUTO: 0.6 % — SIGNIFICANT CHANGE UP (ref 0–1)
BUN SERPL-MCNC: 10 MG/DL — SIGNIFICANT CHANGE UP (ref 10–20)
CALCIUM SERPL-MCNC: 8 MG/DL — LOW (ref 8.5–10.1)
CHLORIDE SERPL-SCNC: 103 MMOL/L — SIGNIFICANT CHANGE UP (ref 98–110)
CO2 SERPL-SCNC: 21 MMOL/L — SIGNIFICANT CHANGE UP (ref 17–32)
CREAT SERPL-MCNC: <0.5 MG/DL — LOW (ref 0.7–1.5)
EOSINOPHIL # BLD AUTO: 0.27 K/UL — SIGNIFICANT CHANGE UP (ref 0–0.7)
EOSINOPHIL NFR BLD AUTO: 5.1 % — SIGNIFICANT CHANGE UP (ref 0–8)
GLUCOSE BLDC GLUCOMTR-MCNC: 103 MG/DL — HIGH (ref 70–99)
GLUCOSE BLDC GLUCOMTR-MCNC: 114 MG/DL — HIGH (ref 70–99)
GLUCOSE BLDC GLUCOMTR-MCNC: 121 MG/DL — HIGH (ref 70–99)
GLUCOSE BLDC GLUCOMTR-MCNC: 124 MG/DL — HIGH (ref 70–99)
GLUCOSE SERPL-MCNC: 113 MG/DL — HIGH (ref 70–99)
HCT VFR BLD CALC: 27.2 % — LOW (ref 37–47)
HGB BLD-MCNC: 9.2 G/DL — LOW (ref 12–16)
IMM GRANULOCYTES NFR BLD AUTO: 0.9 % — HIGH (ref 0.1–0.3)
LYMPHOCYTES # BLD AUTO: 1.1 K/UL — LOW (ref 1.2–3.4)
LYMPHOCYTES # BLD AUTO: 20.6 % — SIGNIFICANT CHANGE UP (ref 20.5–51.1)
MAGNESIUM SERPL-MCNC: 2 MG/DL — SIGNIFICANT CHANGE UP (ref 1.8–2.4)
MCHC RBC-ENTMCNC: 29.7 PG — SIGNIFICANT CHANGE UP (ref 27–31)
MCHC RBC-ENTMCNC: 33.8 G/DL — SIGNIFICANT CHANGE UP (ref 32–37)
MCV RBC AUTO: 87.7 FL — SIGNIFICANT CHANGE UP (ref 81–99)
MONOCYTES # BLD AUTO: 0.63 K/UL — HIGH (ref 0.1–0.6)
MONOCYTES NFR BLD AUTO: 11.8 % — HIGH (ref 1.7–9.3)
NEUTROPHILS # BLD AUTO: 3.26 K/UL — SIGNIFICANT CHANGE UP (ref 1.4–6.5)
NEUTROPHILS NFR BLD AUTO: 61 % — SIGNIFICANT CHANGE UP (ref 42.2–75.2)
NRBC # BLD: 0 /100 WBCS — SIGNIFICANT CHANGE UP (ref 0–0)
PHOSPHATE SERPL-MCNC: 4.1 MG/DL — SIGNIFICANT CHANGE UP (ref 2.1–4.9)
PLATELET # BLD AUTO: 278 K/UL — SIGNIFICANT CHANGE UP (ref 130–400)
POTASSIUM SERPL-MCNC: 4.3 MMOL/L — SIGNIFICANT CHANGE UP (ref 3.5–5)
POTASSIUM SERPL-SCNC: 4.3 MMOL/L — SIGNIFICANT CHANGE UP (ref 3.5–5)
RBC # BLD: 3.1 M/UL — LOW (ref 4.2–5.4)
RBC # FLD: 13.9 % — SIGNIFICANT CHANGE UP (ref 11.5–14.5)
SODIUM SERPL-SCNC: 134 MMOL/L — LOW (ref 135–146)
WBC # BLD: 5.34 K/UL — SIGNIFICANT CHANGE UP (ref 4.8–10.8)
WBC # FLD AUTO: 5.34 K/UL — SIGNIFICANT CHANGE UP (ref 4.8–10.8)

## 2021-10-11 RX ORDER — ELECTROLYTE SOLUTION,INJ
1 VIAL (ML) INTRAVENOUS
Refills: 0 | Status: DISCONTINUED | OUTPATIENT
Start: 2021-10-11 | End: 2021-10-11

## 2021-10-11 RX ORDER — PANTOPRAZOLE SODIUM 20 MG/1
40 TABLET, DELAYED RELEASE ORAL
Refills: 0 | Status: DISCONTINUED | OUTPATIENT
Start: 2021-10-11 | End: 2021-10-14

## 2021-10-11 RX ORDER — ONDANSETRON 8 MG/1
4 TABLET, FILM COATED ORAL ONCE
Refills: 0 | Status: COMPLETED | OUTPATIENT
Start: 2021-10-11 | End: 2021-10-12

## 2021-10-11 RX ORDER — GABAPENTIN 400 MG/1
100 CAPSULE ORAL THREE TIMES A DAY
Refills: 0 | Status: DISCONTINUED | OUTPATIENT
Start: 2021-10-11 | End: 2021-10-14

## 2021-10-11 RX ORDER — I.V. FAT EMULSION 20 G/100ML
2.12 EMULSION INTRAVENOUS
Qty: 100.06 | Refills: 0 | Status: DISCONTINUED | OUTPATIENT
Start: 2021-10-11 | End: 2021-10-11

## 2021-10-11 RX ADMIN — CHLORHEXIDINE GLUCONATE 1 APPLICATION(S): 213 SOLUTION TOPICAL at 06:31

## 2021-10-11 RX ADMIN — Medication 100 MILLIGRAM(S): at 15:21

## 2021-10-11 RX ADMIN — Medication 100 MILLIGRAM(S): at 05:13

## 2021-10-11 RX ADMIN — Medication 15 MILLIGRAM(S): at 18:05

## 2021-10-11 RX ADMIN — Medication 15 MILLIGRAM(S): at 05:15

## 2021-10-11 RX ADMIN — Medication 15 MILLIGRAM(S): at 17:05

## 2021-10-11 RX ADMIN — Medication 100 MILLIGRAM(S): at 22:15

## 2021-10-11 RX ADMIN — Medication 15 MILLIGRAM(S): at 07:30

## 2021-10-11 RX ADMIN — ENOXAPARIN SODIUM 40 MILLIGRAM(S): 100 INJECTION SUBCUTANEOUS at 11:25

## 2021-10-11 RX ADMIN — Medication 15 MILLIGRAM(S): at 11:17

## 2021-10-11 RX ADMIN — Medication 650 MILLIGRAM(S): at 05:15

## 2021-10-11 RX ADMIN — Medication 650 MILLIGRAM(S): at 07:30

## 2021-10-11 RX ADMIN — Medication 650 MILLIGRAM(S): at 17:04

## 2021-10-11 RX ADMIN — GABAPENTIN 100 MILLIGRAM(S): 400 CAPSULE ORAL at 16:33

## 2021-10-11 RX ADMIN — Medication 60 EACH: at 20:01

## 2021-10-11 RX ADMIN — GABAPENTIN 100 MILLIGRAM(S): 400 CAPSULE ORAL at 22:15

## 2021-10-11 RX ADMIN — Medication 650 MILLIGRAM(S): at 18:05

## 2021-10-11 RX ADMIN — Medication 650 MILLIGRAM(S): at 11:16

## 2021-10-11 RX ADMIN — PANTOPRAZOLE SODIUM 40 MILLIGRAM(S): 20 TABLET, DELAYED RELEASE ORAL at 12:16

## 2021-10-11 RX ADMIN — Medication 15 MILLIGRAM(S): at 12:15

## 2021-10-11 RX ADMIN — Medication 15 MILLIGRAM(S): at 22:24

## 2021-10-11 RX ADMIN — GABAPENTIN 100 MILLIGRAM(S): 400 CAPSULE ORAL at 06:31

## 2021-10-11 RX ADMIN — Medication 650 MILLIGRAM(S): at 12:15

## 2021-10-11 RX ADMIN — I.V. FAT EMULSION 31.3 GM/KG/DAY: 20 EMULSION INTRAVENOUS at 20:24

## 2021-10-11 RX ADMIN — Medication 25 MICROGRAM(S): at 23:08

## 2021-10-11 RX ADMIN — CEFTRIAXONE 100 MILLIGRAM(S): 500 INJECTION, POWDER, FOR SOLUTION INTRAMUSCULAR; INTRAVENOUS at 10:06

## 2021-10-11 NOTE — CHART NOTE - NSCHARTNOTEFT_GEN_A_CORE
Calorie count ordered 10/10, nursing hung 10/11, confirmed breakfast recorded this AM, calorie count ordered for 10/11, 10/12, 10/13, to be calculated 10/14.

## 2021-10-12 ENCOUNTER — TRANSCRIPTION ENCOUNTER (OUTPATIENT)
Age: 71
End: 2021-10-12

## 2021-10-12 LAB
ANION GAP SERPL CALC-SCNC: 13 MMOL/L — SIGNIFICANT CHANGE UP (ref 7–14)
BASOPHILS # BLD AUTO: 0.02 K/UL — SIGNIFICANT CHANGE UP (ref 0–0.2)
BASOPHILS # BLD AUTO: 0.03 K/UL — SIGNIFICANT CHANGE UP (ref 0–0.2)
BASOPHILS NFR BLD AUTO: 0.4 % — SIGNIFICANT CHANGE UP (ref 0–1)
BASOPHILS NFR BLD AUTO: 0.4 % — SIGNIFICANT CHANGE UP (ref 0–1)
BUN SERPL-MCNC: 13 MG/DL — SIGNIFICANT CHANGE UP (ref 10–20)
CALCIUM SERPL-MCNC: 7.9 MG/DL — LOW (ref 8.5–10.1)
CHLORIDE SERPL-SCNC: 105 MMOL/L — SIGNIFICANT CHANGE UP (ref 98–110)
CO2 SERPL-SCNC: 21 MMOL/L — SIGNIFICANT CHANGE UP (ref 17–32)
CREAT SERPL-MCNC: <0.5 MG/DL — LOW (ref 0.7–1.5)
CULTURE RESULTS: SIGNIFICANT CHANGE UP
EOSINOPHIL # BLD AUTO: 0.24 K/UL — SIGNIFICANT CHANGE UP (ref 0–0.7)
EOSINOPHIL # BLD AUTO: 0.41 K/UL — SIGNIFICANT CHANGE UP (ref 0–0.7)
EOSINOPHIL NFR BLD AUTO: 4.5 % — SIGNIFICANT CHANGE UP (ref 0–8)
EOSINOPHIL NFR BLD AUTO: 5.6 % — SIGNIFICANT CHANGE UP (ref 0–8)
GLUCOSE BLDC GLUCOMTR-MCNC: 110 MG/DL — HIGH (ref 70–99)
GLUCOSE BLDC GLUCOMTR-MCNC: 110 MG/DL — HIGH (ref 70–99)
GLUCOSE BLDC GLUCOMTR-MCNC: 117 MG/DL — HIGH (ref 70–99)
GLUCOSE SERPL-MCNC: 114 MG/DL — HIGH (ref 70–99)
HCT VFR BLD CALC: 26.2 % — LOW (ref 37–47)
HCT VFR BLD CALC: 27.1 % — LOW (ref 37–47)
HGB BLD-MCNC: 8.9 G/DL — LOW (ref 12–16)
HGB BLD-MCNC: 8.9 G/DL — LOW (ref 12–16)
IMM GRANULOCYTES NFR BLD AUTO: 1.5 % — HIGH (ref 0.1–0.3)
IMM GRANULOCYTES NFR BLD AUTO: 1.7 % — HIGH (ref 0.1–0.3)
LYMPHOCYTES # BLD AUTO: 1.23 K/UL — SIGNIFICANT CHANGE UP (ref 1.2–3.4)
LYMPHOCYTES # BLD AUTO: 1.63 K/UL — SIGNIFICANT CHANGE UP (ref 1.2–3.4)
LYMPHOCYTES # BLD AUTO: 22.2 % — SIGNIFICANT CHANGE UP (ref 20.5–51.1)
LYMPHOCYTES # BLD AUTO: 23.1 % — SIGNIFICANT CHANGE UP (ref 20.5–51.1)
MAGNESIUM SERPL-MCNC: 2.1 MG/DL — SIGNIFICANT CHANGE UP (ref 1.8–2.4)
MCHC RBC-ENTMCNC: 28.9 PG — SIGNIFICANT CHANGE UP (ref 27–31)
MCHC RBC-ENTMCNC: 29.7 PG — SIGNIFICANT CHANGE UP (ref 27–31)
MCHC RBC-ENTMCNC: 32.8 G/DL — SIGNIFICANT CHANGE UP (ref 32–37)
MCHC RBC-ENTMCNC: 34 G/DL — SIGNIFICANT CHANGE UP (ref 32–37)
MCV RBC AUTO: 87.3 FL — SIGNIFICANT CHANGE UP (ref 81–99)
MCV RBC AUTO: 88 FL — SIGNIFICANT CHANGE UP (ref 81–99)
MONOCYTES # BLD AUTO: 0.45 K/UL — SIGNIFICANT CHANGE UP (ref 0.1–0.6)
MONOCYTES # BLD AUTO: 0.59 K/UL — SIGNIFICANT CHANGE UP (ref 0.1–0.6)
MONOCYTES NFR BLD AUTO: 8 % — SIGNIFICANT CHANGE UP (ref 1.7–9.3)
MONOCYTES NFR BLD AUTO: 8.4 % — SIGNIFICANT CHANGE UP (ref 1.7–9.3)
NEUTROPHILS # BLD AUTO: 3.3 K/UL — SIGNIFICANT CHANGE UP (ref 1.4–6.5)
NEUTROPHILS # BLD AUTO: 4.57 K/UL — SIGNIFICANT CHANGE UP (ref 1.4–6.5)
NEUTROPHILS NFR BLD AUTO: 61.9 % — SIGNIFICANT CHANGE UP (ref 42.2–75.2)
NEUTROPHILS NFR BLD AUTO: 62.3 % — SIGNIFICANT CHANGE UP (ref 42.2–75.2)
NRBC # BLD: 0 /100 WBCS — SIGNIFICANT CHANGE UP (ref 0–0)
NRBC # BLD: 0 /100 WBCS — SIGNIFICANT CHANGE UP (ref 0–0)
PHOSPHATE SERPL-MCNC: 3.5 MG/DL — SIGNIFICANT CHANGE UP (ref 2.1–4.9)
PLATELET # BLD AUTO: 319 K/UL — SIGNIFICANT CHANGE UP (ref 130–400)
PLATELET # BLD AUTO: 363 K/UL — SIGNIFICANT CHANGE UP (ref 130–400)
POTASSIUM SERPL-MCNC: 4.5 MMOL/L — SIGNIFICANT CHANGE UP (ref 3.5–5)
POTASSIUM SERPL-SCNC: 4.5 MMOL/L — SIGNIFICANT CHANGE UP (ref 3.5–5)
RBC # BLD: 3 M/UL — LOW (ref 4.2–5.4)
RBC # BLD: 3.08 M/UL — LOW (ref 4.2–5.4)
RBC # FLD: 13.7 % — SIGNIFICANT CHANGE UP (ref 11.5–14.5)
RBC # FLD: 13.8 % — SIGNIFICANT CHANGE UP (ref 11.5–14.5)
SODIUM SERPL-SCNC: 139 MMOL/L — SIGNIFICANT CHANGE UP (ref 135–146)
SPECIMEN SOURCE: SIGNIFICANT CHANGE UP
WBC # BLD: 5.33 K/UL — SIGNIFICANT CHANGE UP (ref 4.8–10.8)
WBC # BLD: 7.34 K/UL — SIGNIFICANT CHANGE UP (ref 4.8–10.8)
WBC # FLD AUTO: 5.33 K/UL — SIGNIFICANT CHANGE UP (ref 4.8–10.8)
WBC # FLD AUTO: 7.34 K/UL — SIGNIFICANT CHANGE UP (ref 4.8–10.8)

## 2021-10-12 RX ORDER — KETOROLAC TROMETHAMINE 30 MG/ML
15 SYRINGE (ML) INJECTION ONCE
Refills: 0 | Status: DISCONTINUED | OUTPATIENT
Start: 2021-10-12 | End: 2021-10-12

## 2021-10-12 RX ADMIN — Medication 650 MILLIGRAM(S): at 23:35

## 2021-10-12 RX ADMIN — CEFTRIAXONE 100 MILLIGRAM(S): 500 INJECTION, POWDER, FOR SOLUTION INTRAMUSCULAR; INTRAVENOUS at 10:17

## 2021-10-12 RX ADMIN — Medication 100 MILLIGRAM(S): at 09:07

## 2021-10-12 RX ADMIN — Medication 650 MILLIGRAM(S): at 11:45

## 2021-10-12 RX ADMIN — Medication 650 MILLIGRAM(S): at 05:53

## 2021-10-12 RX ADMIN — Medication 15 MILLIGRAM(S): at 11:45

## 2021-10-12 RX ADMIN — Medication 15 MILLIGRAM(S): at 17:00

## 2021-10-12 RX ADMIN — Medication 100 MILLIGRAM(S): at 15:45

## 2021-10-12 RX ADMIN — ONDANSETRON 4 MILLIGRAM(S): 8 TABLET, FILM COATED ORAL at 22:10

## 2021-10-12 RX ADMIN — GABAPENTIN 100 MILLIGRAM(S): 400 CAPSULE ORAL at 22:10

## 2021-10-12 RX ADMIN — PANTOPRAZOLE SODIUM 40 MILLIGRAM(S): 20 TABLET, DELAYED RELEASE ORAL at 05:54

## 2021-10-12 RX ADMIN — Medication 650 MILLIGRAM(S): at 17:00

## 2021-10-12 RX ADMIN — GABAPENTIN 100 MILLIGRAM(S): 400 CAPSULE ORAL at 05:53

## 2021-10-12 RX ADMIN — ENOXAPARIN SODIUM 40 MILLIGRAM(S): 100 INJECTION SUBCUTANEOUS at 11:45

## 2021-10-12 RX ADMIN — GABAPENTIN 100 MILLIGRAM(S): 400 CAPSULE ORAL at 15:46

## 2021-10-12 RX ADMIN — Medication 100 MILLIGRAM(S): at 22:10

## 2021-10-12 RX ADMIN — Medication 15 MILLIGRAM(S): at 05:54

## 2021-10-12 RX ADMIN — Medication 25 MICROGRAM(S): at 23:36

## 2021-10-12 RX ADMIN — Medication 650 MILLIGRAM(S): at 00:24

## 2021-10-12 NOTE — DISCHARGE NOTE PROVIDER - HOSPITAL COURSE
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 Juarez in the office with severe Lower abdominal pain. Patient reports that the pain is associated with nausea and vomiting, and has been going on for the past 4 days. Patient also reports fevers as high as 101. Denies chills, chest pain, shortness of breath, change in bowel function, urinary symptoms, or recent trauma. Patient was brought to the OR for exploratory celiotomy, herman colectomy with end colostomy, and open appendectomy. Intraoperative findings include large amount of pus and granulation tissue in the abdomen and stercoral ulcer with perforation at the mid sigmoid colon. Appendectomy was preformed due to adhesions from appendix to the colon and abdominal wall. Abdomen closed with retentions and YELITZA placed, copious irrigation used for washout. Post operatively, patient was voiding, ambulating, and pain is controlled. Patient is stable and ready for discharge.

## 2021-10-12 NOTE — DISCHARGE NOTE PROVIDER - CARE PROVIDER_API CALL
Tj Juarez  SURGERY  [address] [place] [address]  Phone: [phone]  Fax: [phone]  Follow Up Time:

## 2021-10-12 NOTE — DISCHARGE NOTE PROVIDER - NSDCMRMEDTOKEN_GEN_ALL_CORE_FT
levothyroxine 50 mcg (0.05 mg) oral capsule: 1 cap(s) orally once a day  Xyzal 5 mg oral tablet: 1 tab(s) orally once a day (in the evening)  Zetia 10 mg oral tablet: 1 tab(s) orally once a day

## 2021-10-12 NOTE — ADVANCED PRACTICE NURSE CONSULT - REASON FOR CONSULT
WOCN consult requested for post-op stoma teaching 
Continued post-op stoma teaching, pouch change, provide all d/c paperwork & supplies

## 2021-10-12 NOTE — DISCHARGE NOTE PROVIDER - NSDCCPTREATMENT_GEN_ALL_CORE_FT
PRINCIPAL PROCEDURE  Procedure: Exploratory celiotomy  Findings and Treatment:       SECONDARY PROCEDURE  Procedure: Open appendectomy  Findings and Treatment:     Procedure: Darrell colectomy  Findings and Treatment:

## 2021-10-12 NOTE — CHART NOTE - NSCHARTNOTEFT_GEN_A_CORE
Pt tolerating liquids and diet advanced to solids today  Colostomy functioning  PPN infusing  d/w surgical team    T(F): 98.3 (10-12-21 @ 16:59), Max: 98.3 (10-12-21 @ 16:59)  HR: 81 (10-12-21 @ 16:59) (80 - 89)  BP: 100/65 (10-12-21 @ 16:59) (95/61 - 113/68)  RR: 18 (10-12-21 @ 16:59) (18 - 18)  SpO2: 100% (10-12-21 @ 16:59) (95% - 100%)    I&O's Detail    11 Oct 2021 07:01  -  12 Oct 2021 07:00  --------------------------------------------------------  IN:    Fat Emulsion (Plant Based) 20%: 344.3 mL    Oral Fluid: 60 mL    PPN (Peripheral Parenteral Nutrition): 660 mL  Total IN: 1064.3 mL    OUT:    Colostomy (mL): 500 mL    Voided (mL): 3775 mL  Total OUT: 4275 mL    Total NET: -3210.7 mL    12 Oct 2021 07:01  -  12 Oct 2021 18:03  --------------------------------------------------------  IN:    Fat Emulsion (Plant Based) 20%: 159.6 mL    PPN (Peripheral Parenteral Nutrition): 360 mL  Total IN: 519.6 mL    OUT:    Colostomy (mL): 50 mL  Total OUT: 50 mL    Total NET: 469.6 mL    10-12    139  |  105  |  13  ----------------------------<  114<H>  4.5   |  21  |  <0.5<L>    Ca    7.9<L>      12 Oct 2021 04:30  Phos  3.5     10-12  Mg     2.1     10-12                        8.9    5.33  )-----------( 319      ( 12 Oct 2021 04:30 )             26.2     CAPILLARY BLOOD GLUCOSE  POCT Blood Glucose.: 113 mg/dL (12 Oct 2021 17:49)  POCT Blood Glucose.: 110 mg/dL (12 Oct 2021 11:28)  POCT Blood Glucose.: 110 mg/dL (12 Oct 2021 05:13)  POCT Blood Glucose.: 117 mg/dL (12 Oct 2021 00:52)  POCT Blood Glucose.: 121 mg/dL (11 Oct 2021 19:41)    Diet, Regular:   Fiber/Residue Restricted  Supplement Feeding Modality:  Oral  Ensure Enlive Cans or Servings Per Day:  3       Frequency:  Three Times a day (10-12-21 @ 09:31)    ASSESSMENT  72 y/o female with Stercoral Perforation of Sigmoid Colon  Diffuse peritonitis  S/P Darrell's Procedure    PLAN  - d/c PPN when current infusion complete  - PO diet as tolerated

## 2021-10-12 NOTE — ADVANCED PRACTICE NURSE CONSULT - ASSESSMENT
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 Juarez in the office with severe Lower abdominal pain. Patient reports that the pain is associated with nausea and vomiting, and has been going on for the past 4 days. Patient also reports fevers as high as 101. Mildly tachycardiac and febrile. WBC 11.42. Clinical findings, labs, and imaging consistent with peritonitis secondary to perforated viscus, likely colonic.   Now s/p Hartmanns for sigmoid perforation 10/6.     Received patient in 4C, sitting up in chair at bedside, awake, A&Ox3, with recognition of WOCN from previous visit, made aware of purpose of this WOCN visit, agreeable to consult. Assisted patient back into bed for  ostomy lesson. Colostomy to LLQ w/ [place] 2 1/4" drainable pouching system in place, barrier intact, patient reports pouching systema applied on Thurs 10/7 in place until Juancarlos 10/10 when leakage occurred underneath barrier. Pouching system gently removed from pt's abdomen w/ water moistened gauze, flat ring not utilized w/ current pouching system, stool leakage circumferentially around stoma & on pt's skin.        Type of ostomy: end colostomy   Location: LLQ  Gus: n/a  Size: now measueing 1 1/2"  Mucosa: red, moist, remains minimally budded-at skin level; convex test remains slightly positive   Peristomal skin: intact  Mucocutaneous junction: intact  Abdominal contours: round, semi-soft   Output: scant amount of brown semi-formed stool present in removed pouch  Midline/lap sites/ drains: midline abdominal dressing in place, in proximity to ostomy; Surgery PA at bedside to change midline abdominal dressing     Patient re-pouched w/ [place] 2 1/4” CeraPlus flat barrier #77431 (cut to 1 1/2" & off-centered to prevent overlapping onto midline incision), [place] Adapt flat CeraRing #[id number], and [place]  2 1/4" drainable pouch w/ lock & roll closure #[id number]. Full ostomy lesson provided to patient.    Impression:   Colostomy to LLQ-given pt's stomal characteristics and abdominal topography, pt requires slightly convex pouching system at this time; flat ring again utilized to provide convexity, absorb excess moisture and protect peristomal skin w/ shrinking post-op stomal size    Patient again ready & willing to learn ostomy care, fully attentive during pouch change and ostomy lesson, asking appropriate questions; able to verbalize key ostomy points as discussed during lesson.    Again discussed w/ patient ability to utilize close-ended pouches at home, patient again shown how to connect/disconnect pouch to barrier to dispose of pouch; instructed patient to practice pouch disposing  w/ staff assistance as this skill is required prior to d/c home.    Again reviewed with patient in detail dietary restrictions and stomal obstruction s/s and prevention and lifestyle modifications (clothing, bathing, physical activity, etc).  Patient verbalized understanding of all information given.  Written information regarding all above topics provided to patient.    
Received patient in 4C, laying supine in bed, awake, A&Ox3, made aware of purpose of WOCN visit, agreeable to consult. Colostomy to LLQ w/ [place] 2 3/4" drainable pouching system applied in OR in place, barrier intact, overlapped onto midline abdominal dressing. Pouching system gently removed from pt's abdomen w/ water moistened gauze.     Type of ostomy: end colostomy   Location: LLQ  Gus: n/a  Size: 1 5/8"  Mucosa: red, moist, minimally budded-at skin level; convex test slightly positive   Peristomal skin: intact  Mucocutaneous junction: intact  Abdominal contours: round, semi-soft   Output: scant amount of brown tinged liquid  output present in removed pouch   Midline/lap sites/ drains: midline abdominal dressing in place, in proximity to ostomy     Patient re-pouched w/ [place] 2 1/4” CeraPlus flat barrier #98014 (cut to 1 5/8"), [place] Adapt flat CeraRing #[id number], and Erika  2 1/4" drainable pouch w/ lock & roll closure #[id number]. Full ostomy lesson and Erika colostomy instructional booklet provided to patient.    Impression:   Colostomy to LLQ-given pt's stomal characteristics and abdominal topography, pt requires slightly convex pouching system at this time; flat ring utilized to provide convexity, absorb excess moisture and protect peristomal skin w/ shrinking post-op stomal size    Patient ready & willing to learn ostomy care, fully attentive during pouch change and ostomy lesson, asking appropriate questions; able to verbalize key ostomy points as discussed during lesson.    Patient shown how to open/close pouch to empty effluent; discussed w/ patient ability to utilize close-ended pouches at home when stool becomes semi-formed; patient  shown how to connect/disconnect pouch to barrier to dispose of pouch; instructed patient to practice pouch emptying & disposing  w/ staff assistance as this skill is required prior to d/c home.    Reviewed with patient in detail dietary restrictions and stomal obstruction s/s and prevention and lifestyle modifications (clothing, bathing, physical activity, etc).  Patient verbalized understanding of all information given.  Written information regarding all above topics provided to patient.

## 2021-10-12 NOTE — DISCHARGE NOTE PROVIDER - NSDCCPCAREPLAN_GEN_ALL_CORE_FT
PRINCIPAL DISCHARGE DIAGNOSIS  Diagnosis: Perforated bowel  Assessment and Plan of Treatment: Activity: No heavy lifting > 10 lbs for 2 weeks. Avoid straining or excessive activity x 6 weeks.   Dressings: Remove outer dressings in 48 hours and steri strips underneath will fall off on their own. Do not scrub wounds. You may shower but do not bathe. May use ice packs for pain and swelling.   Medications: Please resume all home medications as prescribed.   Pain control: You may take over-the-counter tylenol and motrin three times per day with food for up to 3 days.   Follow up: Please call the number provided to make an appointment with Dr. Juarez in 1-2 weeks. Please call with any questions or concerns including fevers, worsening pain, pus from the wounds, or redness of the skin.       PRINCIPAL DISCHARGE DIAGNOSIS  Diagnosis: Perforated bowel  Assessment and Plan of Treatment: Activity: No heavy lifting > 10 lbs for 2 weeks. Avoid straining or excessive activity x 6 weeks.   Dressings: Remove outer dressings in 48 hours and steri strips underneath will fall off on their own. Do not scrub wounds. You may shower but do not bathe. May use ice packs for pain and swelling.   Medications: Please resume all home medications as prescribed. Please finish antibiotics course until its completed date, 10/20/21.   Pain control: You may take over-the-counter tylenol and motrin three times per day with food for up to 3 days.   Follow up: Please call the number provided to make an appointment with Dr. Juarez in 1-2 weeks. Please call with any questions or concerns including fevers, worsening pain, pus from the wounds, or redness of the skin.

## 2021-10-12 NOTE — ADVANCED PRACTICE NURSE CONSULT - RECOMMEDATIONS
Pouch change: 	  -Gently remove pouch water moistened gauze   -Cleanse stoma site with water moistened gauze, gently pat dry  -Measure stoma, currently 1 5/8"  -Trace and cut current size on [place] 2 1/4” CeraPlus flat barrier (#45648)  -Stretch Erika Adapt CeraRing (#8805) onto back of barrier  -Apply barrier to patient's skin  -Attach [place] 2 1/4” drainable lock and roll pouch (#22270) onto barrier  Empty pouch when 1/3 to no more than 1/2 full of effluent/flatus. Change pouching system q 3 - 4 days and prn for leaking. Next pouch change-Mon 10/11.     Plan of Care: Patient to discharged pending at this time. All d/c paperwork and supplies provided to patient in preparation for d/c & in event patient is d/c's prior to next WOCN visit. Supplies include:  [place]  2 ¼” CeraPlus flat barrier #85429  [place] 2 ¼” disposeable/close-ended pouches w/ gas filter #87133 (transparent), #83852 (beige)  [place] Adapt flat CeraRing #8805  Erika adhesive remover #9043  Erika Adapt stoma powder #7906  3M Cavilon no-sting barrier film #0243  Adapt lubricating deodorant #14699    Patient to follow with MD upon d/c for continued post-op management & care/treatment.  Patient given CN contact info and instructed to call w/ questions/concerns. With pt’s permission, patient to be enrolled in [employer] Secure Start Program upon d/c. If patient remains in-patient over the weekend & Monday, WOCN to follow-up w/ patient Tues 10/12. If patient d/c'ed prior to Tues, staff RN to change pouch on Mon 10/11 prior to d/c.  Questions or concerns, or pouch w/ consistent leakage & unable to obtain seal, please contact Rosanna FRANCISCO [phone]. If pouch leaks after WOCN service hours, please repouch using supplies & technique as indicated above & document where leakage occurred so system can be modified by WOCN as needed.  
Pouch change: 	  -Gently remove pouch water moistened gauze   -Cleanse stoma site with water moistened gauze, gently pat dry  -Measure stoma, currently 1 1/2"  -Trace and cut current size on [place] 2 1/4” CeraPlus flat barrier (#03457); cut barrier off-centered to prevent overlapping of barrier onto midline abdominal incision  -Stretch [place] Adapt CeraRing (#8805) onto back of barrier  -Apply barrier to patient's skin  -Attach [place] 2 1/4” drainable lock and roll pouch (#64488) onto barrier  Empty pouch when 1/3 to no more than 1/2 full of effluent/flatus. Change pouching system q 3 - 4 days and prn for leaking. Next pouch change-Fri 10/15.     Plan of Care: Patient's d/c still pending at this time. All d/c paperwork and supplies provided to patient during previous WOCN visit on Thurs 10/7.  Supplies include:  [place]rika  2 ¼” CeraPlus flat barrier #85120  [place] 2 ¼” disposeable/close-ended pouches w/ gas filter #99874 (transparent), #68871 (beige)  [place] Adapt flat CeraRing #8805  Erika adhesive remover #2433  Erika Adapt stoma powder #7906  3M Cavilon no-sting barrier film #2845  Adapt lubricating deodorant #93122    Patient to follow with MD upon d/c for continued post-op management & care/treatment.  Patient given WOCN contact info and instructed to call w/ questions/concerns. With pt’s permission, patient to be enrolled in [employer] Secure Start Program upon d/c, No further needs/recs from [facility] service. Questions or concerns, or pouch w/ consistent leakage & unable to obtain seal, please contact WOCNRosanna [phone]. If pouch leaks after WOCN service hours, please repouch using supplies & technique as indicated above & document where leakage occurred so system can be modified by WOCN as needed.

## 2021-10-13 LAB
ANION GAP SERPL CALC-SCNC: 10 MMOL/L — SIGNIFICANT CHANGE UP (ref 7–14)
ANION GAP SERPL CALC-SCNC: 11 MMOL/L — SIGNIFICANT CHANGE UP (ref 7–14)
BASOPHILS # BLD AUTO: 0.02 K/UL — SIGNIFICANT CHANGE UP (ref 0–0.2)
BASOPHILS NFR BLD AUTO: 0.4 % — SIGNIFICANT CHANGE UP (ref 0–1)
BUN SERPL-MCNC: 15 MG/DL — SIGNIFICANT CHANGE UP (ref 10–20)
BUN SERPL-MCNC: 19 MG/DL — SIGNIFICANT CHANGE UP (ref 10–20)
CALCIUM SERPL-MCNC: 7.5 MG/DL — LOW (ref 8.5–10.1)
CALCIUM SERPL-MCNC: 7.9 MG/DL — LOW (ref 8.5–10.1)
CHLORIDE SERPL-SCNC: 103 MMOL/L — SIGNIFICANT CHANGE UP (ref 98–110)
CHLORIDE SERPL-SCNC: 104 MMOL/L — SIGNIFICANT CHANGE UP (ref 98–110)
CO2 SERPL-SCNC: 19 MMOL/L — SIGNIFICANT CHANGE UP (ref 17–32)
CO2 SERPL-SCNC: 21 MMOL/L — SIGNIFICANT CHANGE UP (ref 17–32)
CREAT SERPL-MCNC: 0.5 MG/DL — LOW (ref 0.7–1.5)
CREAT SERPL-MCNC: <0.5 MG/DL — LOW (ref 0.7–1.5)
EOSINOPHIL # BLD AUTO: 0.26 K/UL — SIGNIFICANT CHANGE UP (ref 0–0.7)
EOSINOPHIL NFR BLD AUTO: 4.8 % — SIGNIFICANT CHANGE UP (ref 0–8)
GLUCOSE BLDC GLUCOMTR-MCNC: 105 MG/DL — HIGH (ref 70–99)
GLUCOSE BLDC GLUCOMTR-MCNC: 120 MG/DL — HIGH (ref 70–99)
GLUCOSE BLDC GLUCOMTR-MCNC: 98 MG/DL — SIGNIFICANT CHANGE UP (ref 70–99)
GLUCOSE SERPL-MCNC: 101 MG/DL — HIGH (ref 70–99)
GLUCOSE SERPL-MCNC: 99 MG/DL — SIGNIFICANT CHANGE UP (ref 70–99)
HCT VFR BLD CALC: 26 % — LOW (ref 37–47)
HGB BLD-MCNC: 8.4 G/DL — LOW (ref 12–16)
IMM GRANULOCYTES NFR BLD AUTO: 1.5 % — HIGH (ref 0.1–0.3)
LYMPHOCYTES # BLD AUTO: 1.73 K/UL — SIGNIFICANT CHANGE UP (ref 1.2–3.4)
LYMPHOCYTES # BLD AUTO: 31.9 % — SIGNIFICANT CHANGE UP (ref 20.5–51.1)
MAGNESIUM SERPL-MCNC: 1.9 MG/DL — SIGNIFICANT CHANGE UP (ref 1.8–2.4)
MAGNESIUM SERPL-MCNC: 2.2 MG/DL — SIGNIFICANT CHANGE UP (ref 1.8–2.4)
MCHC RBC-ENTMCNC: 28.5 PG — SIGNIFICANT CHANGE UP (ref 27–31)
MCHC RBC-ENTMCNC: 32.3 G/DL — SIGNIFICANT CHANGE UP (ref 32–37)
MCV RBC AUTO: 88.1 FL — SIGNIFICANT CHANGE UP (ref 81–99)
MONOCYTES # BLD AUTO: 0.55 K/UL — SIGNIFICANT CHANGE UP (ref 0.1–0.6)
MONOCYTES NFR BLD AUTO: 10.1 % — HIGH (ref 1.7–9.3)
NEUTROPHILS # BLD AUTO: 2.79 K/UL — SIGNIFICANT CHANGE UP (ref 1.4–6.5)
NEUTROPHILS NFR BLD AUTO: 51.3 % — SIGNIFICANT CHANGE UP (ref 42.2–75.2)
NRBC # BLD: 0 /100 WBCS — SIGNIFICANT CHANGE UP (ref 0–0)
PHOSPHATE SERPL-MCNC: 3.1 MG/DL — SIGNIFICANT CHANGE UP (ref 2.1–4.9)
PHOSPHATE SERPL-MCNC: 3.7 MG/DL — SIGNIFICANT CHANGE UP (ref 2.1–4.9)
PLATELET # BLD AUTO: 392 K/UL — SIGNIFICANT CHANGE UP (ref 130–400)
POTASSIUM SERPL-MCNC: 4.3 MMOL/L — SIGNIFICANT CHANGE UP (ref 3.5–5)
POTASSIUM SERPL-MCNC: 4.6 MMOL/L — SIGNIFICANT CHANGE UP (ref 3.5–5)
POTASSIUM SERPL-SCNC: 4.3 MMOL/L — SIGNIFICANT CHANGE UP (ref 3.5–5)
POTASSIUM SERPL-SCNC: 4.6 MMOL/L — SIGNIFICANT CHANGE UP (ref 3.5–5)
RBC # BLD: 2.95 M/UL — LOW (ref 4.2–5.4)
RBC # FLD: 13.6 % — SIGNIFICANT CHANGE UP (ref 11.5–14.5)
SODIUM SERPL-SCNC: 133 MMOL/L — LOW (ref 135–146)
SODIUM SERPL-SCNC: 135 MMOL/L — SIGNIFICANT CHANGE UP (ref 135–146)
WBC # BLD: 5.43 K/UL — SIGNIFICANT CHANGE UP (ref 4.8–10.8)
WBC # FLD AUTO: 5.43 K/UL — SIGNIFICANT CHANGE UP (ref 4.8–10.8)

## 2021-10-13 PROCEDURE — 93970 EXTREMITY STUDY: CPT | Mod: 26

## 2021-10-13 RX ADMIN — Medication 650 MILLIGRAM(S): at 11:41

## 2021-10-13 RX ADMIN — Medication 650 MILLIGRAM(S): at 23:25

## 2021-10-13 RX ADMIN — Medication 650 MILLIGRAM(S): at 06:03

## 2021-10-13 RX ADMIN — GABAPENTIN 100 MILLIGRAM(S): 400 CAPSULE ORAL at 13:07

## 2021-10-13 RX ADMIN — ENOXAPARIN SODIUM 40 MILLIGRAM(S): 100 INJECTION SUBCUTANEOUS at 11:40

## 2021-10-13 RX ADMIN — Medication 25 MICROGRAM(S): at 22:32

## 2021-10-13 RX ADMIN — GABAPENTIN 100 MILLIGRAM(S): 400 CAPSULE ORAL at 21:59

## 2021-10-13 RX ADMIN — CEFTRIAXONE 100 MILLIGRAM(S): 500 INJECTION, POWDER, FOR SOLUTION INTRAMUSCULAR; INTRAVENOUS at 10:26

## 2021-10-13 RX ADMIN — GABAPENTIN 100 MILLIGRAM(S): 400 CAPSULE ORAL at 06:04

## 2021-10-13 RX ADMIN — OXYCODONE HYDROCHLORIDE 5 MILLIGRAM(S): 5 TABLET ORAL at 10:26

## 2021-10-13 RX ADMIN — CHLORHEXIDINE GLUCONATE 1 APPLICATION(S): 213 SOLUTION TOPICAL at 06:04

## 2021-10-13 RX ADMIN — PANTOPRAZOLE SODIUM 40 MILLIGRAM(S): 20 TABLET, DELAYED RELEASE ORAL at 06:04

## 2021-10-13 RX ADMIN — Medication 100 MILLIGRAM(S): at 06:03

## 2021-10-13 RX ADMIN — Medication 650 MILLIGRAM(S): at 17:42

## 2021-10-13 RX ADMIN — Medication 100 MILLIGRAM(S): at 13:07

## 2021-10-13 RX ADMIN — Medication 15 MILLIGRAM(S): at 00:28

## 2021-10-13 RX ADMIN — Medication 100 MILLIGRAM(S): at 21:56

## 2021-10-13 NOTE — PROGRESS NOTE ADULT - ATTENDING COMMENTS
above noted
above noted discussed case with surgical resident abdomen soft no distension ostomy ok
above noted discussed case with surgical resident and pt abdomen soft no distension ostomy ok
above noted discussed case with surgical resident and pt abdomen soft no distension ostomy ok awaiting d/c plans
above noted discussed case with surgical resident and pt abdomen soft ostomy ok
above noted discussed case with surgical resident ostomy functioning well will progress diet
above noted discussed case with surgical resident tolerating diet ostomy ok awaiting d/c plans

## 2021-10-14 VITALS
OXYGEN SATURATION: 99 % | RESPIRATION RATE: 16 BRPM | TEMPERATURE: 98 F | SYSTOLIC BLOOD PRESSURE: 115 MMHG | HEART RATE: 78 BPM | DIASTOLIC BLOOD PRESSURE: 59 MMHG

## 2021-10-14 LAB
GLUCOSE BLDC GLUCOMTR-MCNC: 93 MG/DL — SIGNIFICANT CHANGE UP (ref 70–99)
SARS-COV-2 RNA SPEC QL NAA+PROBE: SIGNIFICANT CHANGE UP

## 2021-10-14 RX ORDER — APIXABAN 2.5 MG/1
1 TABLET, FILM COATED ORAL
Qty: 7 | Refills: 0
Start: 2021-10-14 | End: 2021-10-20

## 2021-10-14 RX ORDER — METRONIDAZOLE 500 MG
1 TABLET ORAL
Qty: 21 | Refills: 0
Start: 2021-10-14 | End: 2021-10-20

## 2021-10-14 RX ORDER — CEFPODOXIME PROXETIL 100 MG
1 TABLET ORAL
Qty: 14 | Refills: 0
Start: 2021-10-14 | End: 2021-10-20

## 2021-10-14 RX ORDER — ACETAMINOPHEN 500 MG
1 TABLET ORAL
Qty: 4 | Refills: 0
Start: 2021-10-14 | End: 2021-10-14

## 2021-10-14 RX ADMIN — CEFTRIAXONE 100 MILLIGRAM(S): 500 INJECTION, POWDER, FOR SOLUTION INTRAMUSCULAR; INTRAVENOUS at 11:45

## 2021-10-14 RX ADMIN — Medication 100 MILLIGRAM(S): at 05:50

## 2021-10-14 RX ADMIN — Medication 650 MILLIGRAM(S): at 05:50

## 2021-10-14 RX ADMIN — GABAPENTIN 100 MILLIGRAM(S): 400 CAPSULE ORAL at 05:50

## 2021-10-14 RX ADMIN — Medication 650 MILLIGRAM(S): at 00:20

## 2021-10-14 RX ADMIN — Medication 650 MILLIGRAM(S): at 11:44

## 2021-10-14 RX ADMIN — Medication 650 MILLIGRAM(S): at 06:50

## 2021-10-14 RX ADMIN — OXYCODONE HYDROCHLORIDE 5 MILLIGRAM(S): 5 TABLET ORAL at 02:05

## 2021-10-14 RX ADMIN — ENOXAPARIN SODIUM 40 MILLIGRAM(S): 100 INJECTION SUBCUTANEOUS at 11:44

## 2021-10-14 RX ADMIN — PANTOPRAZOLE SODIUM 40 MILLIGRAM(S): 20 TABLET, DELAYED RELEASE ORAL at 05:50

## 2021-10-14 NOTE — CHART NOTE - NSCHARTNOTEFT_GEN_A_CORE
Diet, Regular:   Fiber/Residue Restricted  Supplement Feeding Modality:  Oral  Ensure Enlive Cans or Servings Per Day:  3       Frequency:  Three Times a day (10-12-21 @ 09:31) [Active]      3 Day Calorie Count  Day 1: 913 kcal/57 g protein  Day 2: 1155 kcal/54 g protein  Day 3: 1555 kcal/78 g protein    Av kcal/63 g protein

## 2021-10-14 NOTE — PROGRESS NOTE ADULT - ASSESSMENT
Assessment and Plan:   · Assessment	  71y F s/p Gordon's and appendectomy    PLAN:  - advance diet  - abdominal exams  - f/u colostomy and low drain output  - pain control  - incentive spirometry  - DVT/GI prophylaxis  - SCDs, IS  - encourage ambulation  - daily dressing changes    spectra [phone]  
  ASSESSMENT:  71F POD 8 s/p exploratory laparotomy, Hartmanns procedure, appendectomy, 2/2 stercoral ulceration and sigmoid perforation.  Tolerating diet, Ostomy functioning, pain well controlled.  Home VNS setup for D/C today.    PLAN:  - Daily packing changes  - ID recs appreciated: upon DC switch to VANTIN 200 MG Q12H, FLAGYL 500 MG Q8H until 10/20  - Dispo: Home w/ VNS for wound care    Fabio Hanks MD  PGY-1 General Surgery  [place] Team Spectra [phone]      
ASSESSMENT  70 y/o female with Stercoral Perforation of Sigmoid Colon  Diffuse peritonitis  S/P Darrell's Procedure    PLAN  - cont PPN tonight  - daily bmp, in phos, mg while on parenteral nutrition  - phos repleted  - if pt cont to tolerate po diet, and if intake advanced to allow for her to meet full needs po, will d/c PN tomorrow
ASSESSMENT:   71 year old female POD 4 from herman's and appendectomy     PLAN:  - Monitor ostomy output   - Change packing in midline incision daily   - F/u Drain output  - D/C rocephin and flagyl today    Lines/Tubes: PIV    [facility]EN TEAM SPECTRA: [phone]
Discharge to home tomorrow  
71 year old female s/p Hartmanns for sigmoid perforation  low residue diet and ensures  PPN will discuss with nutrition to discontinue  out of bed and ambulate  Discharge planning  VNS
ASSESSMENT:  71y F s/p Gordon's and appendectomy    PLAN:  - follow up blood cultures  - follow up fever curve  - f/u colostomy and low drain output  - NGT to lcs  - ward in, monitor urine output  - pain control  - incentive spirometry  - DVT/GI prophylaxis  - SCDs, IS  - encourage ambulation    spectra [phone]
  ASSESSMENT:  71F POD 5 s/p exploratory laparotomy, Hartmanns procedure, appendectomy, 2/2 stercoral ulceration and sigmoid perforation.  Advancing diet as tolerated, Ostomy functioning, pain well controlled.    PLAN:  - Daily packing changes  - FU labs  - FU calorie count, TPN, nutrition recs  - FLD w/ TID ensures  - PT to see, encourage OOB/Ambulation  - Pain control  - ID recs appreciated: upon DC switch to VANTIN 200 MG Q12H, FLAGYL 500 MG Q8H until 10/20  - Dispo: Home w/ VNS for wound care    Fabio Hanks MD  PGY-1 General Surgery  [place] Team Spectra [phone]    
71y F s/p Gordon's and appendectomy    PLAN:  - f/u colostomy and low drain output  - NGT to lcs, ok for sips and chips  - ward in, monitor urine output  - pain control  - incentive spirometry  - DVT/GI prophylaxis  - SCDs, IS  - encourage ambulation    spectra [phone]
Assessment and Plan:   · Assessment	  71y F s/p Gordon's and appendectomy    PLAN:  - f/u ID Consult  - f/u colostomy and low drain output  - pain control  - incentive spirometry  - DVT/GI prophylaxis  - SCDs, IS  - encourage ambulation    spectra [phone]

## 2021-10-14 NOTE — PROGRESS NOTE ADULT - REASON FOR ADMISSION
Pneumoperitoneum

## 2021-10-14 NOTE — CHART NOTE - NSCHARTNOTESELECT_GEN_ALL_CORE
Nutrition Support/Nutrition Services
RD limited/Event Note
calorie count/Event Note
Event Note
Nutrition Support/Nutrition Services
Nutrition Support/Nutrition Services
SICU Downgrade/Event Note
Wound Care Instructions/Event Note
anesthesia pacu note

## 2021-10-14 NOTE — PROGRESS NOTE ADULT - SUBJECTIVE AND OBJECTIVE BOX
GENERAL SURGERY PROGRESS NOTE    Patient: PRIYANKA DOHERTY , 71y ([birth year])Female   MRN: [id number]  Location: [address]  Visit: 10-06-21 Inpatient  Date: 10-08-21 @ 11:18    Hospital Day #:4  Post-Op Day #:2    Procedure/Dx/Injuries: S/P exploratory Celiotomy, Gordon's and appendectomy     Events of past 24 hours: Kan and NG tube was D/C, PT was advanced to CLD. No acute events.    PAST MEDICAL & SURGICAL HISTORY:  Hypothyroidism    HLD (hyperlipidemia)    S/P hysterectomy    H/O hernia repair        Vitals:   T(F): 98.3 (10-08-21 @ 09:00), Max: 99.2 (10-08-21 @ 00:30)  HR: 78 (10-08-21 @ 09:00)  BP: 109/70 (10-08-21 @ 09:00)  RR: 18 (10-08-21 @ 09:00)  SpO2: 96% (10-08-21 @ 09:00)      Diet, Clear Liquid      Fluids:     I & O's:    10-07-21 @ 07:01  -  10-08-21 @ 07:00  --------------------------------------------------------  IN:    IV PiggyBack: 600 mL  Total IN: 600 mL    OUT:    Colostomy (mL): 90 mL    Drain (mL): 35 mL    Indwelling Catheter - Urethral (mL): 1650 mL    Other (mL): 100 mL    Voided (mL): 2000 mL  Total OUT: 3875 mL    Total NET: -3275 mL        Bowel Movement: : [x] YES [] NO  Flatus: : [x] YES [] NO    PHYSICAL EXAM:  General: NAD, AAOx3, calm and cooperative  HEENT: NCAT,  Cardiac: RRR S1, S2,   Respiratory: CTAB, normal respiratory effort,   Abdomen: Soft, non-distended, non-tender, no rebound, no guarding. +BS.   Musculoskeletal: Strength 5/5 BL UE/LE, ROM intact, compartments soft  Neuro: Sensation grossly intact and equal throughout, no focal deficits  Vascular: Pulses 2+ throughout, extremities well perfused  Skin: Warm/dry, normal color, no jaundice  Incision/wound:  healing well, dressings in place, clean, dry and intact. Dressing and packing was changed.    MEDICATIONS  (STANDING):  acetaminophen   Tablet .. 650 milliGRAM(s) Oral every 6 hours  chlorhexidine 4% Liquid 1 Application(s) Topical <User Schedule>  enoxaparin Injectable 40 milliGRAM(s) SubCutaneous every 24 hours  fat emulsion (Plant Based) 20% Infusion 2.12 Gm/kG/Day (31.3 mL/Hr) IV Continuous <Continuous>  fat emulsion (Plant Based) 20% Infusion 2.12 Gm/kG/Day (31.3 mL/Hr) IV Continuous <Continuous>  gabapentin Solution 100 milliGRAM(s) Oral three times a day  ketorolac   Injectable 15 milliGRAM(s) IV Push every 6 hours  levothyroxine Injectable 25 MICROGram(s) IV Push at bedtime  ondansetron Injectable 4 milliGRAM(s) IV Push once  pantoprazole  Injectable 40 milliGRAM(s) IV Push daily  Parenteral Nutrition - Adult 1 Each (60 mL/Hr) TPN Continuous <Continuous>  Parenteral Nutrition - Adult 1 Each (60 mL/Hr) TPN Continuous <Continuous>  piperacillin/tazobactam IVPB.. 3.375 Gram(s) IV Intermittent every 8 hours    MEDICATIONS  (PRN):  benzocaine 20% Spray 1 Spray(s) Topical every 4 hours PRN pain  methocarbamol 500 milliGRAM(s) Oral four times a day PRN Muscle Spasm  naloxone Injectable 0.1 milliGRAM(s) IV Push every 3 minutes PRN For ANY of the following changes in patient status:  A. RR LESS THAN 10 breaths per minute, B. Oxygen saturation LESS THAN 90%, C. Sedation score of 6  oxyCODONE    IR 5 milliGRAM(s) Oral every 8 hours PRN Severe Pain (7 - 10)      DVT PROPHYLAXIS: enoxaparin Injectable 40 milliGRAM(s) SubCutaneous every 24 hours    GI PROPHYLAXIS: pantoprazole  Injectable 40 milliGRAM(s) IV Push daily    ANTICOAGULATION:   ANTIBIOTICS:  piperacillin/tazobactam IVPB.. 3.375 Gram(s)            LAB/STUDIES:  Labs:  CAPILLARY BLOOD GLUCOSE      POCT Blood Glucose.: 127 mg/dL (08 Oct 2021 06:04)  POCT Blood Glucose.: 116 mg/dL (08 Oct 2021 00:06)  POCT Blood Glucose.: 102 mg/dL (07 Oct 2021 17:51)  POCT Blood Glucose.: 120 mg/dL (07 Oct 2021 12:05)                          9.6    6.55  )-----------( 261      ( 07 Oct 2021 22:52 )             29.6       Auto Neutrophil %: 79.5 % (10-07-21 @ 22:52)  Auto Immature Granulocyte %: 0.6 % (10-07-21 @ 22:52)    10-08    139  |  107  |  9<L>  ----------------------------<  116<H>  4.1   |  21  |  <0.5<L>      Calcium, Total Serum: 7.3 mg/dL (10-08-21 @ 04:30)      LFTs:     Blood Gas Arterial, Lactate: 0.90 mmol/L (10-06-21 @ 03:49)  Lactate, Blood: 2.4 mmol/L (10-05-21 @ 16:44)    ABG - ( 06 Oct 2021 03:49 )  pH: 7.35  /  pCO2: 34    /  pO2: 121   / HCO3: 19    / Base Excess: -5.9  /  SaO2: 96.4        Coags:      Urinalysis Basic - ( 06 Oct 2021 19:11 )    Color: Yellow / Appearance: Clear / SG: >1.050 / pH: x  Gluc: x / Ketone: Moderate  / Bili: Negative / Urobili: <2 mg/dL   Blood: x / Protein: 100 mg/dL / Nitrite: Negative   Leuk Esterase: Negative / RBC: 54 /HPF / WBC 9 /HPF   Sq Epi: x / Non Sq Epi: 4 /HPF / Bacteria: Negative        Culture - Blood (collected 06 Oct 2021 19:01)  Source: .Blood Blood-Arterial  Preliminary Report (08 Oct 2021 01:02):    No growth to date.    Culture - Acid Fast - Tissue w/Smear (collected 06 Oct 2021 02:00)  Source: .Tissue None    Culture - Tissue with Gram Stain (collected 06 Oct 2021 02:00)  Source: .Tissue None  Gram Stain (06 Oct 2021 14:27):    No polymorphonuclear leukocytes seen per low power field    No organisms seen per oil power field  Preliminary Report (07 Oct 2021 12:08):    Few Gram Negative Rods    Culture - Acid Fast - Tissue w/Smear (collected 06 Oct 2021 02:00)  Source: .Tissue None    Culture - Tissue with Gram Stain (collected 06 Oct 2021 02:00)  Source: .Tissue None  Gram Stain (06 Oct 2021 14:02):    Moderate polymorphonuclear leukocytes seen per low power field    No organisms seen per oil power field    Culture - Blood (collected 05 Oct 2021 16:44)  Source: .Blood Blood-Peripheral  Preliminary Report (06 Oct 2021 22:01):    No growth to date.    Culture - Blood (collected 05 Oct 2021 16:44)  Source: .Blood Blood-Peripheral  Preliminary Report (06 Oct 2021 22:01):    No growth to date.  
GENERAL SURGERY PROGRESS NOTE    Subjective:  Patient advanced to FLD w/ TID ensures yesterday, tolerating but poor total PO intake 2/2 no appetite.  Calorie count started, remains on TPN.  Ostomy with formed stool and gas.  Packing changed yesterday and midline incision appears well healing. Patient underwent ostomy teaching yesterday and discussed with family dispo plan.  All in agreement with home VNS services for dressing/packing changes and ostomy care until patient feels comfortable changing herself.  Family also in process of hiring full time home health aid.  Denies N/V, pain well controlled.  YELITZA drain removed.    Vitals:   T(F): 99.5 (10-10-21 @ 21:01), Max: 99.5 (10-10-21 @ 21:01)  HR: 76 (10-10-21 @ 21:01)  BP: 106/55 (10-10-21 @ 21:01)  RR: 18 (10-10-21 @ 21:01)  SpO2: 97% (10-10-21 @ 21:01)      Diet, Full Liquid:   Supplement Feeding Modality:  Oral  Ensure Enlive Cans or Servings Per Day:  2       Frequency:  Three Times a day      Fluids:     I & O's:    10-09-21 @ 07:01  -  10-10-21 @ 07:00  --------------------------------------------------------  IN:    Fat Emulsion (Plant Based) 20%: 532.1 mL    Oral Fluid: 240 mL    PPN (Peripheral Parenteral Nutrition): 480 mL  Total IN: 1252.1 mL    OUT:    Colostomy (mL): 400 mL    Drain (mL): 105 mL    Voided (mL): 3575 mL  Total OUT: 4080 mL    Total NET: -2827.9 mL            PHYSICAL EXAM:  General: NAD  Cardiac: RR  Respiratory: unlabored breathing at rest  Abdomen: Soft, non-distended, non-tender, no rebound, no guarding. Ostomy pink/patent and with formed brown stool in bag.  Musculoskeletal: FROM in b/l UE and LE  Neuro: Sensation grossly intact and equal throughout, no focal deficits  Skin: Warm/dry, normal color, no jaundice  Incision/wound: midline incision wound well healing, packing changed, no erythema or discharge.     MEDICATIONS  (STANDING):  acetaminophen   Tablet .. 650 milliGRAM(s) Oral every 6 hours  cefTRIAXone   IVPB 2000 milliGRAM(s) IV Intermittent every 24 hours  chlorhexidine 4% Liquid 1 Application(s) Topical <User Schedule>  enoxaparin Injectable 40 milliGRAM(s) SubCutaneous every 24 hours  fat emulsion (Plant Based) 20% Infusion 2.12 Gm/kG/Day (31.3 mL/Hr) IV Continuous <Continuous>  gabapentin Solution 100 milliGRAM(s) Oral three times a day  ketorolac   Injectable 15 milliGRAM(s) IV Push every 6 hours  levothyroxine Injectable 25 MICROGram(s) IV Push at bedtime  metroNIDAZOLE  IVPB 500 milliGRAM(s) IV Intermittent every 8 hours  pantoprazole  Injectable 40 milliGRAM(s) IV Push daily  Parenteral Nutrition - Adult 1 Each (60 mL/Hr) TPN Continuous <Continuous>    MEDICATIONS  (PRN):  benzocaine 20% Spray 1 Spray(s) Topical every 4 hours PRN pain  methocarbamol 500 milliGRAM(s) Oral four times a day PRN Muscle Spasm  naloxone Injectable 0.1 milliGRAM(s) IV Push every 3 minutes PRN For ANY of the following changes in patient status:  A. RR LESS THAN 10 breaths per minute, B. Oxygen saturation LESS THAN 90%, C. Sedation score of 6  oxyCODONE    IR 5 milliGRAM(s) Oral every 8 hours PRN Severe Pain (7 - 10)      DVT PROPHYLAXIS: enoxaparin Injectable 40 milliGRAM(s) SubCutaneous every 24 hours    GI PROPHYLAXIS: pantoprazole  Injectable 40 milliGRAM(s) IV Push daily    ANTICOAGULATION:   ANTIBIOTICS:  cefTRIAXone   IVPB 2000 milliGRAM(s)  metroNIDAZOLE  IVPB 500 milliGRAM(s)            LAB/STUDIES:  Labs:  CAPILLARY BLOOD GLUCOSE      POCT Blood Glucose.: 103 mg/dL (11 Oct 2021 00:19)                          8.8    6.62  )-----------( 237      ( 10 Oct 2021 06:33 )             27.0       Auto Neutrophil %: 66.0 % (10-10-21 @ 06:33)  Auto Immature Granulocyte %: 0.6 % (10-10-21 @ 06:33)    10-10    137  |  106  |  11  ----------------------------<  116<H>  4.2   |  19  |  <0.5<L>      Calcium, Total Serum: 7.8 mg/dL (10-10-21 @ 06:33)      LFTs:       ABG - ( 06 Oct 2021 03:49 )  pH: 7.35  /  pCO2: 34    /  pO2: 121   / HCO3: 19    / Base Excess: -5.9  /  SaO2: 96.4      
GENERAL SURGERY PROGRESS NOTE    Patient: PRIYANKA DOHERTY , 71y ([birth year])Female   MRN: [id number]  Location: [address]  Visit: 10-06-21 Inpatient  Date: 10-08-21 @ 05:34    Hospital Day #: 4  Post-Op Day #: 2    Procedure/Dx/Injuries: s/p Gordon's and appendectomy    Events of past 24 hours: no acute overnight events    PAST MEDICAL & SURGICAL HISTORY:  Hypothyroidism    HLD (hyperlipidemia)    S/P hysterectomy    H/O hernia repair      Vitals:   T(F): 99 (10-08-21 @ 04:30), Max: 99.2 (10-08-21 @ 00:30)  HR: 88 (10-08-21 @ 04:30)  BP: 116/76 (10-08-21 @ 04:30)  RR: 18 (10-08-21 @ 04:30)  SpO2: 93% (10-08-21 @ 04:30)      Diet, NPO:   With Ice Chips/Sips of Water      Fluids:     I & O's:    10-06-21 @ 07:01  -  10-07-21 @ 07:00  --------------------------------------------------------  IN:    Fat Emulsion (Plant Based) 20%: 375.6 mL    IV PiggyBack: 100 mL    Lactated Ringers Bolus: 500 mL    Other (mL): 80 mL  Total IN: 1055.6 mL    OUT:    Colostomy (mL): 20 mL    Drain (mL): 60 mL    Indwelling Catheter - Urethral (mL): 610 mL    Other (mL): 150 mL  Total OUT: 840 mL    Total NET: 215.6 mL    Bowel Movement: : [] YES [] NO  Flatus: : [] YES [] NO    PHYSICAL EXAM:  General: NAD, AAOx3,   Cardiac: RRR S1, S2,  Respiratory: CTAB, normal respiratory effort,  Abdomen: Soft, non-distended, non tender, ostomy with gas/stool, low with serosanguinous output  Incision/wound: healing well, dressings in place, clean, dry and intact    MEDICATIONS  (STANDING):  acetaminophen   Tablet .. 650 milliGRAM(s) Oral every 6 hours  chlorhexidine 4% Liquid 1 Application(s) Topical <User Schedule>  enoxaparin Injectable 40 milliGRAM(s) SubCutaneous every 24 hours  fat emulsion (Plant Based) 20% Infusion 2.12 Gm/kG/Day (31.3 mL/Hr) IV Continuous <Continuous>  gabapentin Solution 100 milliGRAM(s) Oral three times a day  ketorolac   Injectable 15 milliGRAM(s) IV Push every 6 hours  levothyroxine Injectable 25 MICROGram(s) IV Push at bedtime  ondansetron Injectable 4 milliGRAM(s) IV Push once  pantoprazole  Injectable 40 milliGRAM(s) IV Push daily  Parenteral Nutrition - Adult 1 Each (60 mL/Hr) TPN Continuous <Continuous>  piperacillin/tazobactam IVPB.. 3.375 Gram(s) IV Intermittent every 8 hours    MEDICATIONS  (PRN):  benzocaine 20% Spray 1 Spray(s) Topical every 4 hours PRN pain  methocarbamol 500 milliGRAM(s) Oral four times a day PRN Muscle Spasm  naloxone Injectable 0.1 milliGRAM(s) IV Push every 3 minutes PRN For ANY of the following changes in patient status:  A. RR LESS THAN 10 breaths per minute, B. Oxygen saturation LESS THAN 90%, C. Sedation score of 6  oxyCODONE    IR 5 milliGRAM(s) Oral every 8 hours PRN Severe Pain (7 - 10)      DVT PROPHYLAXIS: enoxaparin Injectable 40 milliGRAM(s) SubCutaneous every 24 hours    GI PROPHYLAXIS: pantoprazole  Injectable 40 milliGRAM(s) IV Push daily    ANTICOAGULATION:   ANTIBIOTICS:  piperacillin/tazobactam IVPB.. 3.375 Gram(s)    LAB/STUDIES:  Labs:  CAPILLARY BLOOD GLUCOSE      POCT Blood Glucose.: 116 mg/dL (08 Oct 2021 00:06)  POCT Blood Glucose.: 102 mg/dL (07 Oct 2021 17:51)  POCT Blood Glucose.: 120 mg/dL (07 Oct 2021 12:05)  POCT Blood Glucose.: 118 mg/dL (07 Oct 2021 06:14)                          9.6    6.55  )-----------( 261      ( 07 Oct 2021 22:52 )             29.6       Auto Neutrophil %: 79.5 % (10-07-21 @ 22:52)  Auto Immature Granulocyte %: 0.6 % (10-07-21 @ 22:52)    10-07    135  |  104  |  10  ----------------------------<  109<H>  4.4   |  18  |  <0.5<L>      Calcium, Total Serum: 7.4 mg/dL (10-07-21 @ 22:52)      LFTs:     Blood Gas Arterial, Lactate: 0.90 mmol/L (10-06-21 @ 03:49)  Lactate, Blood: 2.4 mmol/L (10-05-21 @ 16:44)    ABG - ( 06 Oct 2021 03:49 )  pH: 7.35  /  pCO2: 34    /  pO2: 121   / HCO3: 19    / Base Excess: -5.9  /  SaO2: 96.4              Coags:    CARDIAC MARKERS ( 06 Oct 2021 10:00 )  x     / <0.01 ng/mL / 121 U/L / x     / 1.5 ng/mL    Urinalysis Basic - ( 06 Oct 2021 19:11 )    Color: Yellow / Appearance: Clear / SG: >1.050 / pH: x  Gluc: x / Ketone: Moderate  / Bili: Negative / Urobili: <2 mg/dL   Blood: x / Protein: 100 mg/dL / Nitrite: Negative   Leuk Esterase: Negative / RBC: 54 /HPF / WBC 9 /HPF   Sq Epi: x / Non Sq Epi: 4 /HPF / Bacteria: Negative    Culture - Blood (collected 06 Oct 2021 19:01)  Source: .Blood Blood-Arterial  Preliminary Report (08 Oct 2021 01:02):    No growth to date.    Culture - Acid Fast - Tissue w/Smear (collected 06 Oct 2021 02:00)  Source: .Tissue None    Culture - Tissue with Gram Stain (collected 06 Oct 2021 02:00)  Source: .Tissue None  Gram Stain (06 Oct 2021 14:27):    No polymorphonuclear leukocytes seen per low power field    No organisms seen per oil power field  Preliminary Report (07 Oct 2021 12:08):    Few Gram Negative Rods    Culture - Acid Fast - Tissue w/Smear (collected 06 Oct 2021 02:00)  Source: .Tissue None    Culture - Tissue with Gram Stain (collected 06 Oct 2021 02:00)  Source: .Tissue None  Gram Stain (06 Oct 2021 14:02):    Moderate polymorphonuclear leukocytes seen per low power field    No organisms seen per oil power field    Culture - Blood (collected 05 Oct 2021 16:44)  Source: .Blood Blood-Peripheral  Preliminary Report (06 Oct 2021 22:01):    No growth to date.    Culture - Blood (collected 05 Oct 2021 16:44)  Source: .Blood Blood-Peripheral  Preliminary Report (06 Oct 2021 22:01):    No growth to date.      IMAGING:      ACCESS/ DEVICES:  [x ] Peripheral IV  [ ] Central Venous Line	[ ] R	[ ] L	[ ] IJ	[ ] Fem	[ ] SC	Placed:   [ ] Arterial Line		[ ] R	[ ] L	[ ] Fem	[ ] Rad	[ ] Ax	Placed:   [ ] PICC:					[ ] Mediport  [ ] Urinary Catheter,  Date Placed:   [ ] Chest tube: [ ] Right, [ ] Left  [ ] LOW/Solomon Drains        
GENERAL SURGERY PROGRESS NOTE    Patient: PRIYANKA DOHERTY , 71y ([birth year])Female   MRN: [id number]  Location: [address]  Visit: 10-06-21 Inpatient  Date: 10-09-21 @ 00:36    Hospital Day #:5  Post-Op Day #:3    Procedure/Dx/Injuries:  S/P exploratory Celiotomy, Gordon's and appendectomy       Events of past 24 hours: NG tube was D/C, PT tolerated CLD. Antibiotics were adjusted according to ID recommendations  No acute events.        PAST MEDICAL & SURGICAL HISTORY:  Hypothyroidism    HLD (hyperlipidemia)    S/P hysterectomy    H/O hernia repair        Vitals:   T(F): 98.4 (10-08-21 @ 21:53), Max: 99 (10-08-21 @ 04:30)  HR: 81 (10-08-21 @ 21:53)  BP: 110/67 (10-08-21 @ 21:53)  RR: 18 (10-08-21 @ 21:53)  SpO2: 97% (10-08-21 @ 21:53)      Diet, Clear Liquid      Fluids:     I & O's:    10-07-21 @ 07:01  -  10-08-21 @ 07:00  --------------------------------------------------------  IN:    IV PiggyBack: 600 mL  Total IN: 600 mL    OUT:    Colostomy (mL): 90 mL    Drain (mL): 35 mL    Indwelling Catheter - Urethral (mL): 1650 mL    Other (mL): 100 mL    Voided (mL): 2000 mL  Total OUT: 3875 mL    Total NET: -3275 mL        Bowel Movement: : [x] YES [] NO  Flatus: : [x] YES [] NO    PHYSICAL EXAM:  General: NAD, AAOx3, calm and cooperative  HEENT: NCAT,  Cardiac: RRR S1, S2,   Respiratory: CTAB, normal respiratory effort,   Abdomen: Soft, non-distended, non-tender, no rebound, no guarding. +BS.   Musculoskeletal: Strength 5/5 BL UE/LE, ROM intact, compartments soft  Neuro: Sensation grossly intact and equal throughout, no focal deficits  Vascular: Pulses 2+ throughout, extremities well perfused  Skin: Warm/dry, normal color, no jaundice  Incision/wound:  healing well, dressings in place, clean, dry and intact. Dressing and packing was changed.    MEDICATIONS  (STANDING):  acetaminophen   Tablet .. 650 milliGRAM(s) Oral every 6 hours  cefTRIAXone   IVPB 2000 milliGRAM(s) IV Intermittent every 24 hours  chlorhexidine 4% Liquid 1 Application(s) Topical <User Schedule>  enoxaparin Injectable 40 milliGRAM(s) SubCutaneous every 24 hours  fat emulsion (Plant Based) 20% Infusion 2.12 Gm/kG/Day (31.3 mL/Hr) IV Continuous <Continuous>  gabapentin Solution 100 milliGRAM(s) Oral three times a day  ketorolac   Injectable 15 milliGRAM(s) IV Push every 6 hours  levothyroxine Injectable 25 MICROGram(s) IV Push at bedtime  metroNIDAZOLE  IVPB 500 milliGRAM(s) IV Intermittent every 8 hours  ondansetron Injectable 4 milliGRAM(s) IV Push once  pantoprazole  Injectable 40 milliGRAM(s) IV Push daily  Parenteral Nutrition - Adult 1 Each (60 mL/Hr) TPN Continuous <Continuous>  Parenteral Nutrition - Adult 1 Each (60 mL/Hr) TPN Continuous <Continuous>    MEDICATIONS  (PRN):  benzocaine 20% Spray 1 Spray(s) Topical every 4 hours PRN pain  methocarbamol 500 milliGRAM(s) Oral four times a day PRN Muscle Spasm  naloxone Injectable 0.1 milliGRAM(s) IV Push every 3 minutes PRN For ANY of the following changes in patient status:  A. RR LESS THAN 10 breaths per minute, B. Oxygen saturation LESS THAN 90%, C. Sedation score of 6  oxyCODONE    IR 5 milliGRAM(s) Oral every 8 hours PRN Severe Pain (7 - 10)      DVT PROPHYLAXIS: enoxaparin Injectable 40 milliGRAM(s) SubCutaneous every 24 hours    GI PROPHYLAXIS: pantoprazole  Injectable 40 milliGRAM(s) IV Push daily    ANTICOAGULATION:   ANTIBIOTICS:  cefTRIAXone   IVPB 2000 milliGRAM(s)  metroNIDAZOLE  IVPB 500 milliGRAM(s)            LAB/STUDIES:  Labs:  CAPILLARY BLOOD GLUCOSE      POCT Blood Glucose.: 117 mg/dL (08 Oct 2021 21:14)  POCT Blood Glucose.: 137 mg/dL (08 Oct 2021 11:58)  POCT Blood Glucose.: 127 mg/dL (08 Oct 2021 06:04)                          9.6    6.55  )-----------( 261      ( 07 Oct 2021 22:52 )             29.6         10-08    139  |  107  |  9<L>  ----------------------------<  116<H>  4.1   |  21  |  <0.5<L>      Calcium, Total Serum: 7.3 mg/dL (10-08-21 @ 04:30)      LFTs:     Blood Gas Arterial, Lactate: 0.90 mmol/L (10-06-21 @ 03:49)    ABG - ( 06 Oct 2021 03:49 )  pH: 7.35  /  pCO2: 34    /  pO2: 121   / HCO3: 19    / Base Excess: -5.9  /  SaO2: 96.4              Coags:                Culture - Blood (collected 06 Oct 2021 19:01)  Source: .Blood Blood-Arterial  Preliminary Report (08 Oct 2021 01:02):    No growth to date.    Culture - Acid Fast - Tissue w/Smear (collected 06 Oct 2021 02:00)  Source: .Tissue None    Culture - Tissue with Gram Stain (collected 06 Oct 2021 02:00)  Source: .Tissue None  Gram Stain (06 Oct 2021 14:27):    No polymorphonuclear leukocytes seen per low power field    No organisms seen per oil power field  Final Report (08 Oct 2021 15:04):    Few Escherichia coli    Few Escherichia coli #2 Multiple Morphological Strains    Moderate Bacteroides ovatus group "Susceptibilities not performed"  Organism: Escherichia coli  Escherichia coli (08 Oct 2021 15:04)  Organism: Escherichia coli (08 Oct 2021 15:04)  Organism: Escherichia coli (08 Oct 2021 15:04)    Culture - Acid Fast - Tissue w/Smear (collected 06 Oct 2021 02:00)  Source: .Tissue None    Culture - Tissue with Gram Stain (collected 06 Oct 2021 02:00)  Source: .Tissue None  Gram Stain (06 Oct 2021 14:02):    Moderate polymorphonuclear leukocytes seen per low power field    No organisms seen per oil power field  Preliminary Report (08 Oct 2021 15:13):    Moderate Escherichia coli  Organism: Escherichia coli (08 Oct 2021 15:12)  Organism: Escherichia coli (08 Oct 2021 15:12)        
GENERAL SURGERY PROGRESS NOTE    Patient: PRIYANKA DOHERTY , 71y ([birth year])Female   MRN: [id number]  Location: [address]  Visit: 10-06-21 Inpatient  Date: 10-12-21 @ 10:05    Hospital Day #: 8  Post-Op Day #: 6    Procedure: s/p Hartmanns and appendectomy for sigmoid perforation      PAST MEDICAL & SURGICAL HISTORY:  Hypothyroidism    HLD (hyperlipidemia)    S/P hysterectomy    H/O hernia repair        Vitals:   T(F): 97.6 (10-12-21 @ 05:00), Max: 98.2 (10-11-21 @ 14:11)  HR: 81 (10-12-21 @ 05:00)  BP: 109/75 (10-12-21 @ 05:00)  RR: 18 (10-12-21 @ 05:00)  SpO2: 97% (10-12-21 @ 05:00)      Diet, Regular:   Fiber/Residue Restricted  Supplement Feeding Modality:  Oral  Ensure Enlive Cans or Servings Per Day:  3       Frequency:  Three Times a day      10-11-21 @ 07:01  -  10-12-21 @ 07:00  --------------------------------------------------------  IN:    Fat Emulsion (Plant Based) 20%: 344.3 mL    Oral Fluid: 60 mL    PPN (Peripheral Parenteral Nutrition): 660 mL  Total IN: 1064.3 mL    OUT:    Colostomy (mL): 500 mL    Voided (mL): 3775 mL  Total OUT: 4275 mL    Total NET: -3210.7 mL          PHYSICAL EXAM:  General: NAD, AAOx3, calm and cooperative  HEENT: NCAT, PARRISH, EOMI, Trachea ML, Neck supple  Cardiac: RRR S1, S2, no Murmurs, rubs or gallops  Respiratory: CTAB, normal respiratory effort, breath sounds equal BL, no wheeze, rhonchi or crackles  Abdomen: Soft,  mildly tender, ostomy with stool, midline wound packed, no signs of infection      MEDICATIONS  (STANDING):  acetaminophen   Tablet .. 650 milliGRAM(s) Oral every 6 hours  cefTRIAXone   IVPB 2000 milliGRAM(s) IV Intermittent every 24 hours  chlorhexidine 4% Liquid 1 Application(s) Topical <User Schedule>  enoxaparin Injectable 40 milliGRAM(s) SubCutaneous every 24 hours  fat emulsion (Plant Based) 20% Infusion 2.12 Gm/kG/Day (31.3 mL/Hr) IV Continuous <Continuous>  gabapentin 100 milliGRAM(s) Oral three times a day  ketorolac   Injectable 15 milliGRAM(s) IV Push every 6 hours  levothyroxine Injectable 25 MICROGram(s) IV Push at bedtime  metroNIDAZOLE  IVPB 500 milliGRAM(s) IV Intermittent every 8 hours  ondansetron Injectable 4 milliGRAM(s) IV Push once  pantoprazole    Tablet 40 milliGRAM(s) Oral before breakfast  Parenteral Nutrition - Adult 1 Each (60 mL/Hr) TPN Continuous <Continuous>    MEDICATIONS  (PRN):  benzocaine 20% Spray 1 Spray(s) Topical every 4 hours PRN pain  methocarbamol 500 milliGRAM(s) Oral four times a day PRN Muscle Spasm  naloxone Injectable 0.1 milliGRAM(s) IV Push every 3 minutes PRN For ANY of the following changes in patient status:  A. RR LESS THAN 10 breaths per minute, B. Oxygen saturation LESS THAN 90%, C. Sedation score of 6  oxyCODONE    IR 5 milliGRAM(s) Oral every 8 hours PRN Severe Pain (7 - 10)      DVT PROPHYLAXIS: enoxaparin Injectable 40 milliGRAM(s) SubCutaneous every 24 hours    GI PROPHYLAXIS: pantoprazole    Tablet 40 milliGRAM(s) Oral before breakfast      ANTIBIOTICS:  cefTRIAXone   IVPB 2000 milliGRAM(s)  metroNIDAZOLE  IVPB 500 milliGRAM(s)                        8.9    5.33  )-----------( 319      ( 12 Oct 2021 04:30 )             26.2       Auto Neutrophil %: 61.9 % (10-12-21 @ 04:30)  Auto Immature Granulocyte %: 1.7 % (10-12-21 @ 04:30)    10-12    139  |  105  |  13  ----------------------------<  114<H>  4.5   |  21  |  <0.5<L>      Calcium, Total Serum: 7.9 mg/dL (10-12-21 @ 04:30)      LFTs:       ABG - ( 06 Oct 2021 03:49 )  pH: 7.35  /  pCO2: 34    /  pO2: 121   / HCO3: 19    / Base Excess: -5.9  /  SaO2: 96.4          
GENERAL SURGERY PROGRESS NOTE    Patient: PRIYANKA DOHERTY , 71y ([birth year])Female   MRN: [id number]  Location: [address]  Visit: 10-06-21 Inpatient  Date: 10-07-21 @ 03:27    Hospital Day #: 3  Post-Op Day #: 1    Procedure/Dx/Injuries: s/p Gordon's and appendectomy    Events of past 24 hours: patient hypotensive and febrile during the day, had a fever work up done, cxr unremarkable, UA negative, labs unremarkable    PAST MEDICAL & SURGICAL HISTORY:  Hypothyroidism    HLD (hyperlipidemia)    S/P hysterectomy    H/O hernia repair      Vitals:   T(F): 99.2 (10-07-21 @ 00:53), Max: 101.8 (10-06-21 @ 16:52)  HR: 93 (10-07-21 @ 00:53)  BP: 92/54 (10-07-21 @ 00:53)  RR: 18 (10-07-21 @ 00:53)  SpO2: 90% (10-07-21 @ 00:53)      Diet, NPO      Fluids:     I & O's:    10-05-21 @ 07:01  -  10-06-21 @ 07:00  --------------------------------------------------------  IN:    IV PiggyBack: 150 mL    Lactated Ringers: 400 mL  Total IN: 550 mL    OUT:    Drain (mL): 70 mL    Indwelling Catheter - Urethral (mL): 100 mL  Total OUT: 170 mL    Total NET: 380 mL      Bowel Movement: : [] YES [x] NO  Flatus: : [] YES [] NO    PHYSICAL EXAM:  General: NAD, AAOx3,   Cardiac: RRR S1, S2,  Respiratory: CTAB, normal respiratory effort,  Abdomen: Soft, non-distended, non tender, ostomy with no gas or stool, low with serosanguinous output  Incision/wound: healing well, dressings in place, clean, dry and intact    MEDICATIONS  (STANDING):  chlorhexidine 4% Liquid 1 Application(s) Topical <User Schedule>  enoxaparin Injectable 40 milliGRAM(s) SubCutaneous every 24 hours  fat emulsion (Plant Based) 20% Infusion 2.12 Gm/kG/Day (31.3 mL/Hr) IV Continuous <Continuous>  HYDROmorphone PCA (1 mG/mL) 30 milliLiter(s) PCA Continuous PCA Continuous  levothyroxine Injectable 25 MICROGram(s) IV Push at bedtime  pantoprazole  Injectable 40 milliGRAM(s) IV Push daily  Parenteral Nutrition - Adult 1 Each (60 mL/Hr) TPN Continuous <Continuous>  piperacillin/tazobactam IVPB.. 3.375 Gram(s) IV Intermittent every 8 hours    MEDICATIONS  (PRN):  benzocaine 20% Spray 1 Spray(s) Topical every 4 hours PRN pain  naloxone Injectable 0.1 milliGRAM(s) IV Push every 3 minutes PRN For ANY of the following changes in patient status:  A. RR LESS THAN 10 breaths per minute, B. Oxygen saturation LESS THAN 90%, C. Sedation score of 6      DVT PROPHYLAXIS: enoxaparin Injectable 40 milliGRAM(s) SubCutaneous every 24 hours    GI PROPHYLAXIS: pantoprazole  Injectable 40 milliGRAM(s) IV Push daily    ANTICOAGULATION:   ANTIBIOTICS:  piperacillin/tazobactam IVPB.. 3.375 Gram(s)      LAB/STUDIES:  Labs:  CAPILLARY BLOOD GLUCOSE      POCT Blood Glucose.: 116 mg/dL (07 Oct 2021 00:04)                          8.8    7.48  )-----------( 231      ( 07 Oct 2021 01:10 )             26.9       Auto Neutrophil %: 88.3 % (10-06-21 @ 19:01)  Auto Immature Granulocyte %: 0.4 % (10-06-21 @ 19:01)  Auto Neutrophil %: 88.0 % (10-06-21 @ 04:00)  Auto Immature Granulocyte %: 0.3 % (10-06-21 @ 04:00)    10-06    135  |  104  |  15  ----------------------------<  159<H>  3.6   |  17  |  0.6<L>      Calcium, Total Serum: 6.9 mg/dL (10-06-21 @ 19:01)      LFTs:             6.4  | 0.8  | 13       ------------------[71      ( 05 Oct 2021 16:44 )  3.7  | x    | 17          Lipase:11     Amylase:x         Blood Gas Arterial, Lactate: 0.90 mmol/L (10-06-21 @ 03:49)  Lactate, Blood: 2.4 mmol/L (10-05-21 @ 16:44)    ABG - ( 06 Oct 2021 03:49 )  pH: 7.35  /  pCO2: 34    /  pO2: 121   / HCO3: 19    / Base Excess: -5.9  /  SaO2: 96.4     Coags:     14.00  ----< 1.22    ( 05 Oct 2021 16:55 )     23.2        CARDIAC MARKERS ( 06 Oct 2021 10:00 )  x     / <0.01 ng/mL / 121 U/L / x     / 1.5 ng/mL  CARDIAC MARKERS ( 06 Oct 2021 04:00 )  x     / <0.01 ng/mL / 59 U/L / x     / <1.0 ng/mL    Urinalysis Basic - ( 06 Oct 2021 19:11 )    Color: Yellow / Appearance: Clear / SG: >1.050 / pH: x  Gluc: x / Ketone: Moderate  / Bili: Negative / Urobili: <2 mg/dL   Blood: x / Protein: 100 mg/dL / Nitrite: Negative   Leuk Esterase: Negative / RBC: 54 /HPF / WBC 9 /HPF   Sq Epi: x / Non Sq Epi: 4 /HPF / Bacteria: Negative        Culture - Acid Fast - Tissue w/Smear (collected 06 Oct 2021 02:00)  Source: .Tissue None    Culture - Tissue with Gram Stain (collected 06 Oct 2021 02:00)  Source: .Tissue None  Gram Stain (06 Oct 2021 14:27):    No polymorphonuclear leukocytes seen per low power field    No organisms seen per oil power field    Culture - Acid Fast - Tissue w/Smear (collected 06 Oct 2021 02:00)  Source: .Tissue None    Culture - Tissue with Gram Stain (collected 06 Oct 2021 02:00)  Source: .Tissue None  Gram Stain (06 Oct 2021 14:02):    Moderate polymorphonuclear leukocytes seen per low power field    No organisms seen per oil power field    Culture - Blood (collected 05 Oct 2021 16:44)  Source: .Blood Blood-Peripheral  Preliminary Report (06 Oct 2021 22:01):    No growth to date.    Culture - Blood (collected 05 Oct 2021 16:44)  Source: .Blood Blood-Peripheral  Preliminary Report (06 Oct 2021 22:01):    No growth to date.    IMAGING:      ACCESS/ DEVICES:  [ x] Peripheral IV  [ ] Central Venous Line	[ ] R	[ ] L	[ ] IJ	[ ] Fem	[ ] SC	Placed:   [ ] Arterial Line		[ ] R	[ ] L	[ ] Fem	[ ] Rad	[ ] Ax	Placed:   [ ] PICC:					[ ] Mediport  [ ] Urinary Catheter,  Date Placed:   [ ] Chest tube: [ ] Right, [ ] Left  [ ] LOW/Solomon Drains        
GENERAL SURGERY PROGRESS NOTE    Patient: PRIYANKA DOHERTY , 71y ([birth year])Female   MRN: [id number]  Location: [address]  Visit: 10-06-21 Inpatient  Date: 10-10-21 @ 03:34    Hospital Day #: 6  Post-Op Day #: 4    Procedure/Dx/Injuries: S/P herman's and appendectomy     Events of past 24 hours: NO acute events overnight. Patient is stable and tolerating procedure well. Colostomy is functioning well.     PAST MEDICAL & SURGICAL HISTORY:  Hypothyroidism    HLD (hyperlipidemia)    S/P hysterectomy    H/O hernia repair        Vitals:   T(F): 98.5 (10-10-21 @ 01:00), Max: 100 (10-09-21 @ 21:00)  HR: 77 (10-10-21 @ 01:00)  BP: 104/63 (10-10-21 @ 01:00)  RR: 18 (10-10-21 @ 01:00)  SpO2: 97% (10-10-21 @ 01:00)      Diet, Clear Liquid      Fluids:     I & O's:    10-08-21 @ 07:01  -  10-09-21 @ 07:00  --------------------------------------------------------  IN:    Fat Emulsion (Plant Based) 20%: 344.3 mL    PPN (Peripheral Parenteral Nutrition): 660 mL  Total IN: 1004.3 mL    OUT:    Colostomy (mL): 500 mL    Drain (mL): 70 mL    Voided (mL): 2550 mL  Total OUT: 3120 mL    Total NET: -2115.7 mL        Bowel Movement: : [x] YES [] NO  Flatus: : [x] YES [] NO    PHYSICAL EXAM:  General: NAD, AAOx3, calm and cooperative  HEENT: NCAT, PARRISH, EOMI, Trachea ML, Neck supple  Cardiac: RRR S1, S2, no Murmurs, rubs or gallops  Respiratory: CTAB, normal respiratory effort, breath sounds equal BL, no wheeze, rhonchi or crackles  Abdomen: Soft, non-distended, non-tender, no rebound, no guarding. Midline incision C/D/I. Ostomy site is C/D/I with good ostomy output - pink and well perfused.   Musculoskeletal: Strength 5/5 BL UE/LE, ROM intact, compartments soft  Neuro: Sensation grossly intact and equal throughout, no focal deficits  Vascular: Pulses 2+ throughout, extremities well perfused  Skin: Warm/dry, normal color, no jaundice  Incision/wound: healing well, dressings in place, clean, dry and intact    MEDICATIONS  (STANDING):  acetaminophen   Tablet .. 650 milliGRAM(s) Oral every 6 hours  cefTRIAXone   IVPB 2000 milliGRAM(s) IV Intermittent every 24 hours  chlorhexidine 4% Liquid 1 Application(s) Topical <User Schedule>  enoxaparin Injectable 40 milliGRAM(s) SubCutaneous every 24 hours  fat emulsion (Plant Based) 20% Infusion 2.12 Gm/kG/Day (31.3 mL/Hr) IV Continuous <Continuous>  gabapentin Solution 100 milliGRAM(s) Oral three times a day  ketorolac   Injectable 15 milliGRAM(s) IV Push every 6 hours  levothyroxine Injectable 25 MICROGram(s) IV Push at bedtime  metroNIDAZOLE  IVPB 500 milliGRAM(s) IV Intermittent every 8 hours  pantoprazole  Injectable 40 milliGRAM(s) IV Push daily  Parenteral Nutrition - Adult 1 Each (60 mL/Hr) TPN Continuous <Continuous>    MEDICATIONS  (PRN):  benzocaine 20% Spray 1 Spray(s) Topical every 4 hours PRN pain  methocarbamol 500 milliGRAM(s) Oral four times a day PRN Muscle Spasm  naloxone Injectable 0.1 milliGRAM(s) IV Push every 3 minutes PRN For ANY of the following changes in patient status:  A. RR LESS THAN 10 breaths per minute, B. Oxygen saturation LESS THAN 90%, C. Sedation score of 6  oxyCODONE    IR 5 milliGRAM(s) Oral every 8 hours PRN Severe Pain (7 - 10)      DVT PROPHYLAXIS: enoxaparin Injectable 40 milliGRAM(s) SubCutaneous every 24 hours    GI PROPHYLAXIS: pantoprazole  Injectable 40 milliGRAM(s) IV Push daily    ANTICOAGULATION:   ANTIBIOTICS:  cefTRIAXone   IVPB 2000 milliGRAM(s)  metroNIDAZOLE  IVPB 500 milliGRAM(s)            LAB/STUDIES:  Labs:  CAPILLARY BLOOD GLUCOSE      POCT Blood Glucose.: 111 mg/dL (09 Oct 2021 18:03)  POCT Blood Glucose.: 123 mg/dL (09 Oct 2021 12:02)  POCT Blood Glucose.: 117 mg/dL (09 Oct 2021 06:01)                          8.3    5.19  )-----------( 197      ( 09 Oct 2021 05:38 )             25.0       Auto Neutrophil %: 69.1 % (10-09-21 @ 05:38)  Auto Immature Granulocyte %: 0.4 % (10-09-21 @ 05:38)    10-09    137  |  109  |  10  ----------------------------<  118<H>  4.3   |  19  |  <0.5<L>      Calcium, Total Serum: 7.6 mg/dL (10-09-21 @ 05:38)      LFTs:       ABG - ( 06 Oct 2021 03:49 )  pH: 7.35  /  pCO2: 34    /  pO2: 121   / HCO3: 19    / Base Excess: -5.9  /  SaO2: 96.4          ACCESS/ DEVICES:  [ x] Peripheral IV              
GENERAL SURGERY PROGRESS NOTE    Subjective:  NAEON.  Patient tolerating diet, OOB and ambulating, pain controlled, ostomy functioning with formed brown stool, packing changed.      Vitals:   T(F): 97.5 (10-14-21 @ 08:47), Max: 99.2 (10-13-21 @ 13:48)  HR: 78 (10-14-21 @ 08:47)  BP: 102/59 (10-14-21 @ 08:47)  RR: 16 (10-14-21 @ 08:47)  SpO2: 99% (10-14-21 @ 08:47)      Diet, Regular:   Fiber/Residue Restricted  Supplement Feeding Modality:  Oral  Ensure Enlive Cans or Servings Per Day:  3       Frequency:  Three Times a day      Fluids:     I & O's:    10-13-21 @ 07:01  -  10-14-21 @ 07:00  --------------------------------------------------------  IN:    Oral Fluid: 592 mL  Total IN: 592 mL    OUT:    Colostomy (mL): 455 mL    Voided (mL): 1800 mL  Total OUT: 2255 mL    Total NET: -1663 mL    PHYSICAL EXAM:  General: NAD  Cardiac: RR  Respiratory: unlabored breathing at rest  Abdomen: Soft, non-distended, non-tender, no rebound, no guarding.  Ostomy functioning and with formed brown stool.  Midline incision well healing, no surrounding erythema, no discharge or purulent exudate, packing changed.  Musculoskeletal: FROM in b/l UE and LE  Neuro: Sensation grossly intact and equal throughout, no focal deficits  Skin: Warm/dry, normal color, no jaundice      MEDICATIONS  (STANDING):  acetaminophen   Tablet .. 650 milliGRAM(s) Oral every 6 hours  cefTRIAXone   IVPB 2000 milliGRAM(s) IV Intermittent every 24 hours  chlorhexidine 4% Liquid 1 Application(s) Topical <User Schedule>  enoxaparin Injectable 40 milliGRAM(s) SubCutaneous every 24 hours  gabapentin 100 milliGRAM(s) Oral three times a day  levothyroxine Injectable 25 MICROGram(s) IV Push at bedtime  metroNIDAZOLE  IVPB 500 milliGRAM(s) IV Intermittent every 8 hours  pantoprazole    Tablet 40 milliGRAM(s) Oral before breakfast    MEDICATIONS  (PRN):  benzocaine 20% Spray 1 Spray(s) Topical every 4 hours PRN pain  methocarbamol 500 milliGRAM(s) Oral four times a day PRN Muscle Spasm  naloxone Injectable 0.1 milliGRAM(s) IV Push every 3 minutes PRN For ANY of the following changes in patient status:  A. RR LESS THAN 10 breaths per minute, B. Oxygen saturation LESS THAN 90%, C. Sedation score of 6  oxyCODONE    IR 5 milliGRAM(s) Oral every 8 hours PRN Severe Pain (7 - 10)      DVT PROPHYLAXIS: enoxaparin Injectable 40 milliGRAM(s) SubCutaneous every 24 hours    GI PROPHYLAXIS: pantoprazole    Tablet 40 milliGRAM(s) Oral before breakfast    ANTICOAGULATION:   ANTIBIOTICS:  cefTRIAXone   IVPB 2000 milliGRAM(s)  metroNIDAZOLE  IVPB 500 milliGRAM(s)            LAB/STUDIES:  Labs:  CAPILLARY BLOOD GLUCOSE      POCT Blood Glucose.: 120 mg/dL (13 Oct 2021 20:06)  POCT Blood Glucose.: 98 mg/dL (13 Oct 2021 12:14)                          8.4    5.43  )-----------( 392      ( 13 Oct 2021 20:00 )             26.0       Auto Neutrophil %: 51.3 % (10-13-21 @ 20:00)  Auto Immature Granulocyte %: 1.5 % (10-13-21 @ 20:00)    10-13    133<L>  |  103  |  19  ----------------------------<  99  4.3   |  19  |  0.5<L>      Calcium, Total Serum: 7.5 mg/dL (10-13-21 @ 20:00)    
PRIYANKA DOHERTY  71y Female   [id number]    Hospital Day: 9  Post Operative Day:7  Procedure:s/p ex lap , hartmanns appendectomy   Patient is a 71y old  Female who presents with a chief complaint of Pneumoperitoneum (12 Oct 2021 11:41)    PAST MEDICAL & SURGICAL HISTORY:  Hypothyroidism    HLD (hyperlipidemia)    S/P hysterectomy    H/O hernia repair        Events of the Last 24h:  Vital Signs Last 24 Hrs  T(C): 36.5 (12 Oct 2021 20:23), Max: 36.8 (12 Oct 2021 16:59)  T(F): 97.7 (12 Oct 2021 20:23), Max: 98.3 (12 Oct 2021 16:59)  HR: 91 (12 Oct 2021 20:23) (80 - 91)  BP: 108/70 (12 Oct 2021 20:23) (100/65 - 113/68)  BP(mean): --  RR: 18 (12 Oct 2021 20:23) (18 - 18)  SpO2: 99% (12 Oct 2021 20:23) (95% - 100%)        Diet, Regular:   Fiber/Residue Restricted  Supplement Feeding Modality:  Oral  Ensure Enlive Cans or Servings Per Day:  3       Frequency:  Three Times a day (10-12-21 @ 09:31)      I&O's Summary    11 Oct 2021 07:  -  12 Oct 2021 07:00  --------------------------------------------------------  IN: 1064.3 mL / OUT: 4275 mL / NET: -3210.7 mL    12 Oct 2021 07:01  -  13 Oct 2021 00:59  --------------------------------------------------------  IN: 879.6 mL / OUT: 70 mL / NET: 809.6 mL     I&O's Detail    11 Oct 2021 07:  -  12 Oct 2021 07:00  --------------------------------------------------------  IN:    Fat Emulsion (Plant Based) 20%: 344.3 mL    Oral Fluid: 60 mL    PPN (Peripheral Parenteral Nutrition): 660 mL  Total IN: 1064.3 mL    OUT:    Colostomy (mL): 500 mL    Voided (mL): 3775 mL  Total OUT: 4275 mL    Total NET: -3210.7 mL      12 Oct 2021 07:01  -  13 Oct 2021 00:59  --------------------------------------------------------  IN:    Fat Emulsion (Plant Based) 20%: 159.6 mL    PPN (Peripheral Parenteral Nutrition): 720 mL  Total IN: 879.6 mL    OUT:    Colostomy (mL): 70 mL  Total OUT: 70 mL    Total NET: 809.6 mL          MEDICATIONS  (STANDING):  acetaminophen   Tablet .. 650 milliGRAM(s) Oral every 6 hours  cefTRIAXone   IVPB 2000 milliGRAM(s) IV Intermittent every 24 hours  chlorhexidine 4% Liquid 1 Application(s) Topical <User Schedule>  enoxaparin Injectable 40 milliGRAM(s) SubCutaneous every 24 hours  gabapentin 100 milliGRAM(s) Oral three times a day  levothyroxine Injectable 25 MICROGram(s) IV Push at bedtime  metroNIDAZOLE  IVPB 500 milliGRAM(s) IV Intermittent every 8 hours  pantoprazole    Tablet 40 milliGRAM(s) Oral before breakfast  Parenteral Nutrition - Adult 1 Each (60 mL/Hr) TPN Continuous <Continuous>    MEDICATIONS  (PRN):  benzocaine 20% Spray 1 Spray(s) Topical every 4 hours PRN pain  methocarbamol 500 milliGRAM(s) Oral four times a day PRN Muscle Spasm  naloxone Injectable 0.1 milliGRAM(s) IV Push every 3 minutes PRN For ANY of the following changes in patient status:  A. RR LESS THAN 10 breaths per minute, B. Oxygen saturation LESS THAN 90%, C. Sedation score of 6  oxyCODONE    IR 5 milliGRAM(s) Oral every 8 hours PRN Severe Pain (7 - 10)      PHYSICAL EXAM:    GENERAL: NAD    HEENT: NCAT    CHEST/LUNGS: CTAB    HEART: RRR,  No murmurs, rubs, or gallops    ABDOMEN: SNTND +BS, colostomy with outpt , midline incision clean dry intact     EXTREMITIES:  FROM, No clubbing, cyanosis, or edema, palpable pulse    NEURO: No focal neurological deficits    SKIN: No rashes or lesions    INCISION/WOUNDS:                          8.9    7.34  )-----------( 363      ( 12 Oct 2021 20:00 )             27.1        CBC Full  -  ( 12 Oct 2021 20:00 )  WBC Count : 7.34 K/uL  RBC Count : 3.08 M/uL  Hemoglobin : 8.9 g/dL  Hematocrit : 27.1 %  Platelet Count - Automated : 363 K/uL  Mean Cell Volume : 88.0 fL  Mean Cell Hemoglobin : 28.9 pg  Mean Cell Hemoglobin Concentration : 32.8 g/dL  Auto Neutrophil # : 4.57 K/uL  Auto Lymphocyte # : 1.63 K/uL  Auto Monocyte # : 0.59 K/uL  Auto Eosinophil # : 0.41 K/uL  Auto Basophil # : 0.03 K/uL  Auto Neutrophil % : 62.3 %  Auto Lymphocyte % : 22.2 %  Auto Monocyte % : 8.0 %  Auto Eosinophil % : 5.6 %  Auto Basophil % : 0.4 %               135   |  104   |  15                 Ca: 7.9    BMP:   ----------------------------< 101    M.2   (10-12-21 @ 20:00)             4.6    |  21    | <0.5               Ph: 3.7      LFT:     TPro: 6.4 / Alb: 3.7 / TBili: 0.8 / DBili: x / AST: 13 / ALT: 17 / AlkPhos: 71   (10-05-21 @ 16:44)              
pt alert, verbal, feeling better today  pt tolerated some clears po this morning, was sipping on Ensure Enlive (a full liquid) when seen this afternoon  abd soft, + muddy brown still output in colostomy  Vital Signs Last 24 Hrs  T(C): 36.2 (11 Oct 2021 17:00), Max: 37.5 (10 Oct 2021 21:01)  T(F): 97.2 (11 Oct 2021 17:00), Max: 99.5 (10 Oct 2021 21:01)  HR: 96 (11 Oct 2021 17:00) (76 - 96)  BP: 108/66 (11 Oct 2021 17:00) (97/58 - 113/64)  BP(mean): --  RR: 18 (11 Oct 2021 17:00) (18 - 18)  SpO2: 100% (11 Oct 2021 17:00) (97% - 100%)    MEDICATIONS  (STANDING):  acetaminophen   Tablet .. 650 milliGRAM(s) Oral every 6 hours  cefTRIAXone   IVPB 2000 milliGRAM(s) IV Intermittent every 24 hours  chlorhexidine 4% Liquid 1 Application(s) Topical <User Schedule>  enoxaparin Injectable 40 milliGRAM(s) SubCutaneous every 24 hours  fat emulsion (Plant Based) 20% Infusion 2.12 Gm/kG/Day (31.3 mL/Hr) IV Continuous <Continuous>  gabapentin 100 milliGRAM(s) Oral three times a day  ketorolac   Injectable 15 milliGRAM(s) IV Push every 6 hours  levothyroxine Injectable 25 MICROGram(s) IV Push at bedtime  metroNIDAZOLE  IVPB 500 milliGRAM(s) IV Intermittent every 8 hours  pantoprazole    Tablet 40 milliGRAM(s) Oral before breakfast  Parenteral Nutrition - Adult 1 Each (60 mL/Hr) TPN Continuous <Continuous>  Parenteral Nutrition - Adult 1 Each (60 mL/Hr) TPN Continuous <Continuous>                        9.2    5.34  )-----------( 278      ( 11 Oct 2021 04:30 )             27.2   10-11    134<L>  |  103  |  10  ----------------------------<  113<H>  4.3   |  21  |  <0.5<L>    Ca    8.0<L>      11 Oct 2021 04:30  Phos  4.1     10-11  Mg     2.0     10-11    diet order - full liquid with Ensure Enlive

## 2021-10-25 DIAGNOSIS — E78.5 HYPERLIPIDEMIA, UNSPECIFIED: ICD-10-CM

## 2021-10-25 DIAGNOSIS — R10.9 UNSPECIFIED ABDOMINAL PAIN: ICD-10-CM

## 2021-10-25 DIAGNOSIS — R26.89 OTHER ABNORMALITIES OF GAIT AND MOBILITY: ICD-10-CM

## 2021-10-25 DIAGNOSIS — K63.1 PERFORATION OF INTESTINE (NONTRAUMATIC): ICD-10-CM

## 2021-10-25 DIAGNOSIS — E03.9 HYPOTHYROIDISM, UNSPECIFIED: ICD-10-CM

## 2021-10-25 DIAGNOSIS — K65.8 OTHER PERITONITIS: ICD-10-CM

## 2021-10-25 DIAGNOSIS — A41.9 SEPSIS, UNSPECIFIED ORGANISM: ICD-10-CM

## 2021-10-25 DIAGNOSIS — J98.11 ATELECTASIS: ICD-10-CM

## 2021-10-25 DIAGNOSIS — I95.9 HYPOTENSION, UNSPECIFIED: ICD-10-CM

## 2021-11-30 ENCOUNTER — OUTPATIENT (OUTPATIENT)
Dept: OUTPATIENT SERVICES | Facility: HOSPITAL | Age: 71
LOS: 1 days | Discharge: HOME | End: 2021-11-30
Payer: MEDICARE

## 2021-11-30 DIAGNOSIS — Z12.31 ENCOUNTER FOR SCREENING MAMMOGRAM FOR MALIGNANT NEOPLASM OF BREAST: ICD-10-CM

## 2021-11-30 DIAGNOSIS — Z98.890 OTHER SPECIFIED POSTPROCEDURAL STATES: Chronic | ICD-10-CM

## 2021-11-30 DIAGNOSIS — Z90.710 ACQUIRED ABSENCE OF BOTH CERVIX AND UTERUS: Chronic | ICD-10-CM

## 2021-11-30 PROCEDURE — 77063 BREAST TOMOSYNTHESIS BI: CPT | Mod: 26

## 2021-11-30 PROCEDURE — 77067 SCR MAMMO BI INCL CAD: CPT | Mod: 26

## 2021-12-01 PROBLEM — E78.5 HYPERLIPIDEMIA, UNSPECIFIED: Chronic | Status: ACTIVE | Noted: 2021-10-05

## 2021-12-01 PROBLEM — E03.9 HYPOTHYROIDISM, UNSPECIFIED: Chronic | Status: ACTIVE | Noted: 2021-10-05

## 2022-01-28 ENCOUNTER — OUTPATIENT (OUTPATIENT)
Dept: OUTPATIENT SERVICES | Facility: HOSPITAL | Age: 72
LOS: 1 days | Discharge: HOME | End: 2022-01-28
Payer: MEDICARE

## 2022-01-28 VITALS
RESPIRATION RATE: 16 BRPM | WEIGHT: 100.09 LBS | HEIGHT: 62 IN | TEMPERATURE: 98 F | SYSTOLIC BLOOD PRESSURE: 113 MMHG | DIASTOLIC BLOOD PRESSURE: 76 MMHG | OXYGEN SATURATION: 100 % | HEART RATE: 71 BPM

## 2022-01-28 DIAGNOSIS — Z98.890 OTHER SPECIFIED POSTPROCEDURAL STATES: Chronic | ICD-10-CM

## 2022-01-28 DIAGNOSIS — Z90.710 ACQUIRED ABSENCE OF BOTH CERVIX AND UTERUS: Chronic | ICD-10-CM

## 2022-01-28 DIAGNOSIS — Z01.818 ENCOUNTER FOR OTHER PREPROCEDURAL EXAMINATION: ICD-10-CM

## 2022-01-28 DIAGNOSIS — K57.30 DIVERTICULOSIS OF LARGE INTESTINE WITHOUT PERFORATION OR ABSCESS WITHOUT BLEEDING: ICD-10-CM

## 2022-01-28 LAB
ALBUMIN SERPL ELPH-MCNC: 4 G/DL — SIGNIFICANT CHANGE UP (ref 3.5–5.2)
ALP SERPL-CCNC: 57 U/L — SIGNIFICANT CHANGE UP (ref 30–115)
ALT FLD-CCNC: 12 U/L — SIGNIFICANT CHANGE UP (ref 0–41)
ANION GAP SERPL CALC-SCNC: 10 MMOL/L — SIGNIFICANT CHANGE UP (ref 7–14)
APTT BLD: 36 SEC — SIGNIFICANT CHANGE UP (ref 27–39.2)
AST SERPL-CCNC: 13 U/L — SIGNIFICANT CHANGE UP (ref 0–41)
BASOPHILS # BLD AUTO: 0.04 K/UL — SIGNIFICANT CHANGE UP (ref 0–0.2)
BASOPHILS NFR BLD AUTO: 0.7 % — SIGNIFICANT CHANGE UP (ref 0–1)
BILIRUB SERPL-MCNC: 0.2 MG/DL — SIGNIFICANT CHANGE UP (ref 0.2–1.2)
BLD GP AB SCN SERPL QL: SIGNIFICANT CHANGE UP
BUN SERPL-MCNC: 16 MG/DL — SIGNIFICANT CHANGE UP (ref 10–20)
CALCIUM SERPL-MCNC: 9.7 MG/DL — SIGNIFICANT CHANGE UP (ref 8.5–10.1)
CHLORIDE SERPL-SCNC: 103 MMOL/L — SIGNIFICANT CHANGE UP (ref 98–110)
CO2 SERPL-SCNC: 28 MMOL/L — SIGNIFICANT CHANGE UP (ref 17–32)
CREAT SERPL-MCNC: 0.5 MG/DL — LOW (ref 0.7–1.5)
EOSINOPHIL # BLD AUTO: 0.16 K/UL — SIGNIFICANT CHANGE UP (ref 0–0.7)
EOSINOPHIL NFR BLD AUTO: 2.9 % — SIGNIFICANT CHANGE UP (ref 0–8)
GLUCOSE SERPL-MCNC: 79 MG/DL — SIGNIFICANT CHANGE UP (ref 70–99)
HCT VFR BLD CALC: 36.4 % — LOW (ref 37–47)
HGB BLD-MCNC: 11.8 G/DL — LOW (ref 12–16)
IMM GRANULOCYTES NFR BLD AUTO: 0.2 % — SIGNIFICANT CHANGE UP (ref 0.1–0.3)
INR BLD: 0.96 RATIO — SIGNIFICANT CHANGE UP (ref 0.65–1.3)
LYMPHOCYTES # BLD AUTO: 1.66 K/UL — SIGNIFICANT CHANGE UP (ref 1.2–3.4)
LYMPHOCYTES # BLD AUTO: 30 % — SIGNIFICANT CHANGE UP (ref 20.5–51.1)
MCHC RBC-ENTMCNC: 28.8 PG — SIGNIFICANT CHANGE UP (ref 27–31)
MCHC RBC-ENTMCNC: 32.4 G/DL — SIGNIFICANT CHANGE UP (ref 32–37)
MCV RBC AUTO: 88.8 FL — SIGNIFICANT CHANGE UP (ref 81–99)
MONOCYTES # BLD AUTO: 0.34 K/UL — SIGNIFICANT CHANGE UP (ref 0.1–0.6)
MONOCYTES NFR BLD AUTO: 6.1 % — SIGNIFICANT CHANGE UP (ref 1.7–9.3)
NEUTROPHILS # BLD AUTO: 3.32 K/UL — SIGNIFICANT CHANGE UP (ref 1.4–6.5)
NEUTROPHILS NFR BLD AUTO: 60.1 % — SIGNIFICANT CHANGE UP (ref 42.2–75.2)
NRBC # BLD: 0 /100 WBCS — SIGNIFICANT CHANGE UP (ref 0–0)
PLATELET # BLD AUTO: 380 K/UL — SIGNIFICANT CHANGE UP (ref 130–400)
POTASSIUM SERPL-MCNC: 4.5 MMOL/L — SIGNIFICANT CHANGE UP (ref 3.5–5)
POTASSIUM SERPL-SCNC: 4.5 MMOL/L — SIGNIFICANT CHANGE UP (ref 3.5–5)
PROT SERPL-MCNC: 6.8 G/DL — SIGNIFICANT CHANGE UP (ref 6–8)
PROTHROM AB SERPL-ACNC: 11 SEC — SIGNIFICANT CHANGE UP (ref 9.95–12.87)
RBC # BLD: 4.1 M/UL — LOW (ref 4.2–5.4)
RBC # FLD: 13.5 % — SIGNIFICANT CHANGE UP (ref 11.5–14.5)
SODIUM SERPL-SCNC: 141 MMOL/L — SIGNIFICANT CHANGE UP (ref 135–146)
WBC # BLD: 5.53 K/UL — SIGNIFICANT CHANGE UP (ref 4.8–10.8)
WBC # FLD AUTO: 5.53 K/UL — SIGNIFICANT CHANGE UP (ref 4.8–10.8)

## 2022-01-28 PROCEDURE — 93010 ELECTROCARDIOGRAM REPORT: CPT

## 2022-01-28 RX ORDER — EZETIMIBE 10 MG/1
1 TABLET ORAL
Qty: 0 | Refills: 0 | DISCHARGE

## 2022-01-28 NOTE — H&P PST ADULT - HISTORY OF PRESENT ILLNESS
72 yo female presents for PAST in preparation for EXPLORATORY LAPAROTOMY AND CLOSURE OF COLOSTOMY  Pt complains of 10/6/2021 pt had emergency colostomy placement with 16 pound weight loss. Current weight is 100 lbs. Pt has colostomy "I hate the bag" reports some occasional abdominal pain, unable to gain weight. Denies any chest pain, difficulty breathing, SOB, palpitations, dysuria, URI, or any other infections in the last 2 weeks/1 month. Denies any recent travel, contact, or exposure to any persons with known or suspected COVID-19. Pt also denies COVID testing within the last 2 weeks. Pt advised to self quarantine until day of procedure. Exercise tolerance of 1-2 flights of stairs without dyspnea. FLORENCIA reviewed with patient.  Anesthesia Alert  NO--Difficult Airway  NO--History of neck surgery or radiation  NO--Limited ROM of neck  NO--History of Malignant hyperthermia  NO--Personal or family history of Pseudocholinesterase deficiency.  NO--Prior Anesthesia Complication  NO--Latex Allergy  NO--Loose teeth  NO--History of Rheumatoid Arthritis  NO--FLORENCIA  NO--Bleeding risk  NO--Other_____   written and verbal instructions with teach back on chlorhexidine shampoo provided,  pt verbalized understanding with returned demonstration  Patient verbalized understanding of instructions and was given the opportunity to ask questions and have them answered.

## 2022-01-28 NOTE — H&P PST ADULT - REASON FOR ADMISSION
[facility]  Confirmed Surgery DateTime: 02- - Procedure: EXPLORATORY LAPAROTOMY AND CLOSURE OF COLOSTOMY  Laterality: N/ALength of Procedure: 45 MinutesAnesthesia Type: General

## 2022-01-28 NOTE — H&P PST ADULT - ATTENDING COMMENTS
I met with patient  reviewed consent  r/b/a explained to patient again  all questions answered  patient aware of risks including, but not exclusive of, infection, bleeding, neurovascular damage, numbness, keloid scar, dvt, pe pt aware of ileostomy

## 2022-02-04 ENCOUNTER — RESULT REVIEW (OUTPATIENT)
Age: 72
End: 2022-02-04

## 2022-02-04 ENCOUNTER — INPATIENT (INPATIENT)
Facility: HOSPITAL | Age: 72
LOS: 5 days | Discharge: HOME HEALTH | End: 2022-02-10
Attending: SURGERY | Admitting: SURGERY
Payer: MEDICARE

## 2022-02-04 VITALS
SYSTOLIC BLOOD PRESSURE: 96 MMHG | HEART RATE: 76 BPM | WEIGHT: 100.09 LBS | TEMPERATURE: 97 F | DIASTOLIC BLOOD PRESSURE: 60 MMHG | OXYGEN SATURATION: 100 % | RESPIRATION RATE: 15 BRPM | HEIGHT: 62 IN

## 2022-02-04 DIAGNOSIS — Z98.890 OTHER SPECIFIED POSTPROCEDURAL STATES: Chronic | ICD-10-CM

## 2022-02-04 DIAGNOSIS — Z90.710 ACQUIRED ABSENCE OF BOTH CERVIX AND UTERUS: Chronic | ICD-10-CM

## 2022-02-04 LAB
ALBUMIN SERPL ELPH-MCNC: 3.1 G/DL — LOW (ref 3.5–5.2)
ALP SERPL-CCNC: 47 U/L — SIGNIFICANT CHANGE UP (ref 30–115)
ALT FLD-CCNC: 11 U/L — SIGNIFICANT CHANGE UP (ref 0–41)
ANION GAP SERPL CALC-SCNC: 13 MMOL/L — SIGNIFICANT CHANGE UP (ref 7–14)
APTT BLD: 33.5 SEC — SIGNIFICANT CHANGE UP (ref 27–39.2)
AST SERPL-CCNC: 16 U/L — SIGNIFICANT CHANGE UP (ref 0–41)
BASOPHILS # BLD AUTO: 0.03 K/UL — SIGNIFICANT CHANGE UP (ref 0–0.2)
BASOPHILS NFR BLD AUTO: 0.3 % — SIGNIFICANT CHANGE UP (ref 0–1)
BILIRUB SERPL-MCNC: 0.2 MG/DL — SIGNIFICANT CHANGE UP (ref 0.2–1.2)
BUN SERPL-MCNC: 12 MG/DL — SIGNIFICANT CHANGE UP (ref 10–20)
CALCIUM SERPL-MCNC: 7.5 MG/DL — LOW (ref 8.5–10.1)
CHLORIDE SERPL-SCNC: 106 MMOL/L — SIGNIFICANT CHANGE UP (ref 98–110)
CO2 SERPL-SCNC: 17 MMOL/L — SIGNIFICANT CHANGE UP (ref 17–32)
CREAT SERPL-MCNC: 0.6 MG/DL — LOW (ref 0.7–1.5)
EOSINOPHIL # BLD AUTO: 0.03 K/UL — SIGNIFICANT CHANGE UP (ref 0–0.7)
EOSINOPHIL NFR BLD AUTO: 0.3 % — SIGNIFICANT CHANGE UP (ref 0–8)
GLUCOSE BLDC GLUCOMTR-MCNC: 111 MG/DL — HIGH (ref 70–99)
GLUCOSE BLDC GLUCOMTR-MCNC: 91 MG/DL — SIGNIFICANT CHANGE UP (ref 70–99)
GLUCOSE SERPL-MCNC: 118 MG/DL — HIGH (ref 70–99)
HCT VFR BLD CALC: 35.7 % — LOW (ref 37–47)
HGB BLD-MCNC: 11.4 G/DL — LOW (ref 12–16)
IMM GRANULOCYTES NFR BLD AUTO: 0.3 % — SIGNIFICANT CHANGE UP (ref 0.1–0.3)
INR BLD: 1.02 RATIO — SIGNIFICANT CHANGE UP (ref 0.65–1.3)
LACTATE SERPL-SCNC: 1.4 MMOL/L — SIGNIFICANT CHANGE UP (ref 0.7–2)
LYMPHOCYTES # BLD AUTO: 0.94 K/UL — LOW (ref 1.2–3.4)
LYMPHOCYTES # BLD AUTO: 8 % — LOW (ref 20.5–51.1)
MAGNESIUM SERPL-MCNC: 2.1 MG/DL — SIGNIFICANT CHANGE UP (ref 1.8–2.4)
MCHC RBC-ENTMCNC: 28.3 PG — SIGNIFICANT CHANGE UP (ref 27–31)
MCHC RBC-ENTMCNC: 31.9 G/DL — LOW (ref 32–37)
MCV RBC AUTO: 88.6 FL — SIGNIFICANT CHANGE UP (ref 81–99)
MONOCYTES # BLD AUTO: 0.27 K/UL — SIGNIFICANT CHANGE UP (ref 0.1–0.6)
MONOCYTES NFR BLD AUTO: 2.3 % — SIGNIFICANT CHANGE UP (ref 1.7–9.3)
NEUTROPHILS # BLD AUTO: 10.5 K/UL — HIGH (ref 1.4–6.5)
NEUTROPHILS NFR BLD AUTO: 88.8 % — HIGH (ref 42.2–75.2)
NRBC # BLD: 0 /100 WBCS — SIGNIFICANT CHANGE UP (ref 0–0)
PHOSPHATE SERPL-MCNC: 2.7 MG/DL — SIGNIFICANT CHANGE UP (ref 2.1–4.9)
PLATELET # BLD AUTO: 313 K/UL — SIGNIFICANT CHANGE UP (ref 130–400)
POTASSIUM SERPL-MCNC: 4.1 MMOL/L — SIGNIFICANT CHANGE UP (ref 3.5–5)
POTASSIUM SERPL-SCNC: 4.1 MMOL/L — SIGNIFICANT CHANGE UP (ref 3.5–5)
PROT SERPL-MCNC: 5.1 G/DL — LOW (ref 6–8)
PROTHROM AB SERPL-ACNC: 11.7 SEC — SIGNIFICANT CHANGE UP (ref 9.95–12.87)
RBC # BLD: 4.03 M/UL — LOW (ref 4.2–5.4)
RBC # FLD: 13.4 % — SIGNIFICANT CHANGE UP (ref 11.5–14.5)
SODIUM SERPL-SCNC: 136 MMOL/L — SIGNIFICANT CHANGE UP (ref 135–146)
TROPONIN T SERPL-MCNC: <0.01 NG/ML — SIGNIFICANT CHANGE UP
WBC # BLD: 11.81 K/UL — HIGH (ref 4.8–10.8)
WBC # FLD AUTO: 11.81 K/UL — HIGH (ref 4.8–10.8)

## 2022-02-04 PROCEDURE — 88305 TISSUE EXAM BY PATHOLOGIST: CPT | Mod: 26

## 2022-02-04 PROCEDURE — 99221 1ST HOSP IP/OBS SF/LOW 40: CPT

## 2022-02-04 PROCEDURE — 88304 TISSUE EXAM BY PATHOLOGIST: CPT | Mod: 26

## 2022-02-04 PROCEDURE — 88307 TISSUE EXAM BY PATHOLOGIST: CPT | Mod: 26

## 2022-02-04 PROCEDURE — 71045 X-RAY EXAM CHEST 1 VIEW: CPT | Mod: 26

## 2022-02-04 PROCEDURE — 93010 ELECTROCARDIOGRAM REPORT: CPT

## 2022-02-04 RX ORDER — PROCHLORPERAZINE MALEATE 5 MG
10 TABLET ORAL ONCE
Refills: 0 | Status: DISCONTINUED | OUTPATIENT
Start: 2022-02-04 | End: 2022-02-04

## 2022-02-04 RX ORDER — SODIUM CHLORIDE 9 MG/ML
1000 INJECTION, SOLUTION INTRAVENOUS
Refills: 0 | Status: DISCONTINUED | OUTPATIENT
Start: 2022-02-04 | End: 2022-02-04

## 2022-02-04 RX ORDER — PROCHLORPERAZINE MALEATE 5 MG
5 TABLET ORAL ONCE
Refills: 0 | Status: COMPLETED | OUTPATIENT
Start: 2022-02-04 | End: 2022-02-04

## 2022-02-04 RX ORDER — MORPHINE SULFATE 50 MG/1
4 CAPSULE, EXTENDED RELEASE ORAL EVERY 4 HOURS
Refills: 0 | Status: DISCONTINUED | OUTPATIENT
Start: 2022-02-04 | End: 2022-02-04

## 2022-02-04 RX ORDER — GABAPENTIN 400 MG/1
100 CAPSULE ORAL EVERY 8 HOURS
Refills: 0 | Status: DISCONTINUED | OUTPATIENT
Start: 2022-02-04 | End: 2022-02-10

## 2022-02-04 RX ORDER — ACETAMINOPHEN 500 MG
650 TABLET ORAL EVERY 6 HOURS
Refills: 0 | Status: COMPLETED | OUTPATIENT
Start: 2022-02-04 | End: 2022-02-07

## 2022-02-04 RX ORDER — ACETAMINOPHEN 500 MG
650 TABLET ORAL EVERY 6 HOURS
Refills: 0 | Status: DISCONTINUED | OUTPATIENT
Start: 2022-02-04 | End: 2022-02-04

## 2022-02-04 RX ORDER — CEFOTETAN DISODIUM 1 G
2 VIAL (EA) INJECTION EVERY 12 HOURS
Refills: 0 | Status: COMPLETED | OUTPATIENT
Start: 2022-02-04 | End: 2022-02-05

## 2022-02-04 RX ORDER — ONDANSETRON 8 MG/1
4 TABLET, FILM COATED ORAL EVERY 6 HOURS
Refills: 0 | Status: DISCONTINUED | OUTPATIENT
Start: 2022-02-04 | End: 2022-02-10

## 2022-02-04 RX ORDER — ONDANSETRON 8 MG/1
4 TABLET, FILM COATED ORAL
Refills: 0 | Status: DISCONTINUED | OUTPATIENT
Start: 2022-02-04 | End: 2022-02-04

## 2022-02-04 RX ORDER — ENOXAPARIN SODIUM 100 MG/ML
40 INJECTION SUBCUTANEOUS DAILY
Refills: 0 | Status: DISCONTINUED | OUTPATIENT
Start: 2022-02-04 | End: 2022-02-09

## 2022-02-04 RX ORDER — LEVOTHYROXINE SODIUM 125 MCG
50 TABLET ORAL DAILY
Refills: 0 | Status: DISCONTINUED | OUTPATIENT
Start: 2022-02-04 | End: 2022-02-10

## 2022-02-04 RX ORDER — SODIUM CHLORIDE 9 MG/ML
1000 INJECTION, SOLUTION INTRAVENOUS
Refills: 0 | Status: DISCONTINUED | OUTPATIENT
Start: 2022-02-04 | End: 2022-02-05

## 2022-02-04 RX ORDER — KETOROLAC TROMETHAMINE 30 MG/ML
30 SYRINGE (ML) INJECTION ONCE
Refills: 0 | Status: DISCONTINUED | OUTPATIENT
Start: 2022-02-04 | End: 2022-02-04

## 2022-02-04 RX ORDER — FENTANYL CITRATE 50 UG/ML
25 INJECTION INTRAVENOUS
Refills: 0 | Status: DISCONTINUED | OUTPATIENT
Start: 2022-02-04 | End: 2022-02-04

## 2022-02-04 RX ORDER — PANTOPRAZOLE SODIUM 20 MG/1
40 TABLET, DELAYED RELEASE ORAL DAILY
Refills: 0 | Status: DISCONTINUED | OUTPATIENT
Start: 2022-02-04 | End: 2022-02-10

## 2022-02-04 RX ORDER — HYDROMORPHONE HYDROCHLORIDE 2 MG/ML
0.5 INJECTION INTRAMUSCULAR; INTRAVENOUS; SUBCUTANEOUS
Refills: 0 | Status: DISCONTINUED | OUTPATIENT
Start: 2022-02-04 | End: 2022-02-04

## 2022-02-04 RX ORDER — BUPIVACAINE 13.3 MG/ML
20 INJECTION, SUSPENSION, LIPOSOMAL INFILTRATION ONCE
Refills: 0 | Status: DISCONTINUED | OUTPATIENT
Start: 2022-02-04 | End: 2022-02-04

## 2022-02-04 RX ORDER — CHLORHEXIDINE GLUCONATE 213 G/1000ML
1 SOLUTION TOPICAL
Refills: 0 | Status: DISCONTINUED | OUTPATIENT
Start: 2022-02-04 | End: 2022-02-10

## 2022-02-04 RX ADMIN — Medication 650 MILLIGRAM(S): at 19:17

## 2022-02-04 RX ADMIN — SODIUM CHLORIDE 50 MILLILITER(S): 9 INJECTION, SOLUTION INTRAVENOUS at 17:50

## 2022-02-04 RX ADMIN — Medication 5 MILLIGRAM(S): at 18:20

## 2022-02-04 RX ADMIN — ONDANSETRON 4 MILLIGRAM(S): 8 TABLET, FILM COATED ORAL at 16:20

## 2022-02-04 RX ADMIN — GABAPENTIN 100 MILLIGRAM(S): 400 CAPSULE ORAL at 22:22

## 2022-02-04 RX ADMIN — Medication 100 GRAM(S): at 22:28

## 2022-02-04 RX ADMIN — SODIUM CHLORIDE 100 MILLILITER(S): 9 INJECTION, SOLUTION INTRAVENOUS at 16:20

## 2022-02-04 RX ADMIN — Medication 650 MILLIGRAM(S): at 23:43

## 2022-02-04 RX ADMIN — Medication 62.5 MILLIMOLE(S): at 19:20

## 2022-02-04 NOTE — CONSULT NOTE ADULT - ASSESSMENT
Assessment & Plan    71y Female  s/p     NEURO:    Acute pain-controlled with     RESP:     Oxygen insufficiency-wean off NC to RA as tolerate    Activity-          Current Rx:     Home Rx: Xyzal 5 mg oral tablet        CARDS:     Imaging:     Labs:       GI/NUTR:     Diet, NPO      GI Prophylaxis-    Bowel regimen-      /RENAL:        Monitor UO-ward in place    Current Rx:     Labs:          BUN/Cr-          Electrolytes-    Home Rx:       HEME/ONC:       DVT prophylaxis-enoxaparin Injectable  , SCDs    Labs: Hb/Hct:                       Plts:                  PTT/INR:        Home Rx:       ID:  WBC-  Temp trend- 24hrs T(F): 97.1 (02-04 @ 08:56), Max: 97.1 (02-04 @ 08:56)  Antibiotics-  Cultures:           ENDO:    Glucose     HA1C     LINES/DRAINS:  Gracie OVIEDO Foley     DISPO:    SICU Assessment & Plan    This is a 71 year old female with a pmhx significant for hypothyroid, hyperlipidemia hysterectomy, hernia repair and recent Gordon's procedure with colostomy creation on 12/7/2021 who presented today for colostomy reversal with Dr. Juarez.     NEURO:    Acute pain-controlled with     morphine PRN    RESP:     Oxygen insufficiency-wean off NC to RA as tolerate    Activity-          Current Rx:     Home Rx: Xyzal 5 mg oral tablet        CARDS:     Imaging:     Labs: f/u EKG for QTc prolongation on Zofran PRN      GI/NUTR:     Diet, NPO    Zofran PRN - monitor for QTc prolongation    GI Prophylaxis- protonix 40mg IVP daily    Bowel regimen- None      /RENAL:        Monitor UO-ward in place    Current Rx:     Labs:          BUN/Cr- 16/0.5          Electrolytes- Na 141   K 4.5    Mg --    Phos --    Home Rx:       HEME/ONC:       DVT prophylaxis-enoxaparin Injectable  , SCDs    Labs: Hb/Hct:       11.8/36.4                Plts:    380              PTT/INR:    11/0.96    Home Rx: Lovenox 40mg subcu daily      ID:  WBC-  5.53  Temp trend- 24hrs T(F): 97.1 (02-04 @ 08:56), Max: 97.1 (02-04 @ 08:56)  Antibiotics-  Cultures:           ENDO:    Glucose 111    HA1C     LINES/DRAINS:  PIV, Ward, NGT, YELITZA drain    DISPO:    SICU Assessment & Plan    This is a 71 year old female with a pmhx significant for hypothyroid, hyperlipidemia hysterectomy, hernia repair and recent Gordon's procedure with colostomy creation on 12/7/2021 who presented today for colostomy reversal with Dr. Juarez.     NEURO:    Acute pain-controlled with     morphine PRN    RESP:     Oxygen insufficiency-wean off NC to RA as tolerate    Activity-          Current Rx:     Home Rx: Xyzal 5 mg oral tablet        CARDS:     Imaging: EKG NSR 70bpm, no acute ST segment changes, QTc 441    Labs: f/u EKG for QTc prolongation on Zofran PRN    Meds: Aspirin 81mg daily      GI/NUTR:     Diet, NPO    Zofran PRN - monitor for QTc prolongation    GI Prophylaxis- protonix 40mg IVP daily    Bowel regimen- None      /RENAL:        Monitor UO-ward in place    Current Rx:     Labs:          BUN/Cr- 16/0.5          Electrolytes- Na 141   K 4.5    Mg --    Phos --    Home Rx:       HEME/ONC:       DVT prophylaxis-enoxaparin Injectable  , SCDs    Labs: Hb/Hct:       11.8/36.4                Plts:    380              PTT/INR:    11/0.96    Home Rx: Lovenox 40mg subcu daily      ID:  WBC-  5.53  Temp trend- 24hrs T(F): 97.1 (02-04 @ 08:56), Max: 97.1 (02-04 @ 08:56)  Antibiotics-  Cultures:           ENDO:    Glucose 111    HA1C     LINES/DRAINS:  PIV, Ward, NGT, YELITZA drain    DISPO:    SICU Assessment & Plan    This is a 71 year old female with a pmhx significant for hypothyroid, hyperlipidemia hysterectomy, hernia repair and recent Gordon's procedure with colostomy creation on 12/7/2021 who presented today for colostomy reversal with Dr. Juarez.     NEURO:    Acute pain-controlled with morphine PRN    RESP:     Oxygen insufficiency-wean off NC to RA as tolerate    Activity -          Home Rx: Xyzal 5 mg oral tablet        CARDS:     Imaging: EKG NSR 70bpm, no acute ST segment changes, QTc 441    Labs: f/u daily EKG for QTc prolongation on Zofran PRN    Meds: Aspirin 81mg daily      GI/NUTR:     Diet, NPO    Zofran PRN    GI Prophylaxis- protonix 40mg IVP daily    Bowel regimen- None    /RENAL:        Monitor UO-ward in place    Labs:          BUN/Cr- 16/0.5          Electrolytes- Na 141   K 4.5    Mg --    Phos --      HEME/ONC:       DVT prophylaxis-enoxaparin Injectable, SCDs    Labs: Hb/Hct:       11.8/36.4                Plts:    380              PTT/INR:    11/0.96    ID:  WBC-  5.53  Temp trend- 24hrs T(F): 97.1 (02-04 @ 08:56), Max: 97.1 (02-04 @ 08:56)  Antibiotics- amos-op Cefotetan (will receive 3 doses total)    ENDO:    Glucose 111, NPO    LINES/DRAINS:  PIV, Ward, NGT, YELITZA drain    DISPO:    SICU Assessment & Plan    This is a 71 year old female with a pmhx significant for hypothyroid, hyperlipidemia hysterectomy, hernia repair and recent Gordon's procedure with colostomy creation on 12/7/2021 who presented today for colostomy reversal with Dr. Juarez.     NEURO:    Acute pain-controlled with morphine PRN    RESP:     Oxygen insufficiency-wean off NC to RA as tolerate    Activity -          Home Rx: Xyzal 5 mg oral tablet        CARDS:     Imaging: EKG NSR 70bpm, no acute ST segment changes, QTc 494    Labs: f/u daily EKG for QTc prolongation on Zofran PRN    Meds: Aspirin 81mg daily      GI/NUTR:     Diet, NPO    Zofran PRN    GI Prophylaxis- protonix 40mg IVP daily    Bowel regimen- None    /RENAL:        Monitor UO-ward in place    Labs:          BUN/Cr- 16/0.5          Electrolytes- Na 141   K 4.5    Mg --    Phos --      HEME/ONC:       DVT prophylaxis-enoxaparin Injectable, SCDs    Labs: Hb/Hct:       11.8/36.4                Plts:    380              PTT/INR:    11/0.96    ID:  WBC-  5.53  Temp trend- 24hrs T(F): 97.1 (02-04 @ 08:56), Max: 97.1 (02-04 @ 08:56)  Antibiotics- amos-op Cefotetan (will receive 3 doses total)    ENDO:    Glucose 111, NPO    LINES/DRAINS:  PIV, Ward, NGT, YELITZA drain    DISPO:    SICU Assessment & Plan  This is a 71 year old female with a pmhx significant for hypothyroidism, hyperlipidemia, hysterectomy, hernia repair and recent Gordon's procedure with colostomy creation on 12/7/2021 who presented today for colostomy reversal with Dr. Juarez.     NEURO:    Acute pain-controlled with Tylenol ATC and Dilaudid PRN    RESP:     Tolerating RA     Activity - Encourage IS when able    Home Rx: Xyzal 5 mg oral tablet      CARDS:     Imaging: EKG NSR 70bpm, no acute ST segment changes, QTc 494    Labs: f/u daily EKG for QTc prolongation on Zofran PRN    Meds: Aspirin 81mg daily on hold while NPO    GI/NUTR:     Diet, NPO, NGT to low int suction    Zofran PRN    GI Prophylaxis- protonix 40mg IVP daily    Bowel regimen- None    /RENAL:        Monitor UO-ward in place    Labs:          BUN/Cr- 16/0.5          Electrolytes- Na 141   K 4.5    Mg --    Phos --    HEME/ONC:       DVT prophylaxis- 40mg enoxaparin Injectable daily, SCDs    Labs: Hb/Hct:       11.8/36.4                Plts:    380              PTT/INR:    11/0.96    ID:  WBC-  5.53  Temp trend- 24hrs T(F): 97.1 (02-04 @ 08:56), Max: 97.1 (02-04 @ 08:56)  Antibiotics- amos-op Cefotetan (will receive 3 doses total)    ENDO:    Glucose 111, NPO    LINES/DRAINS:  PIV, Ward, NGT, YELITZA drain    DISPO:    SICU, discussed with Dr. Barriga Assessment & Plan  This is a 71 year old female with a pmhx significant for hypothyroidism, hyperlipidemia, hysterectomy, hernia repair and recent Gordon's procedure with colostomy creation on 12/7/2021 who presented today for colostomy reversal with Dr. Juarez.     NEURO:    Acute pain-controlled with Tylenol ATC and Dilaudid PRN    RESP:     Tolerating RA     Activity - Encourage IS when able    Home Rx: Xyzal 5 mg oral tablet      CARDS:     Imaging: EKG NSR 70bpm, no acute ST segment changes, QTc 494    Labs: f/u daily EKG for QTc prolongation on Zofran PRN    Meds: Aspirin 81mg daily on hold while NPO    GI/NUTR:     Diet, NPO, NGT to low int suction    Zofran PRN    GI Prophylaxis- protonix 40mg IVP daily    Bowel regimen- None    /RENAL:        Monitor UO-ward in place    Labs:          BUN/Cr- 16/0.5          Electrolytes- Na 136   K 4.1    Mg 2.1    Phos 2.7 (repleted)    HEME/ONC:       DVT prophylaxis- 40mg enoxaparin Injectable daily, SCDs    Labs: Hb/Hct:       11.8/36.4  --> 11.4/35.7              Plts:    380 --> 313              PTT/INR:    11/0.96 --> 11.7/1.02    ID:  WBC-  5.53 --> 11.81  Temp trend- 24hrs T(F): 97.1 (02-04 @ 08:56), Max: 97.1 (02-04 @ 08:56)  Antibiotics- amos-op Cefotetan (will receive 3 doses total)    ENDO:    Glucose 111, NPO    LINES/DRAINS:  PIV, Ward, NGT, YELITZA drain    DISPO:   Pt does not require SICU care, discussed with Dr. Barriga. Spoke with Green Team Resident Solomon Braswell at 6pm.

## 2022-02-04 NOTE — CONSULT NOTE ADULT - CONSULT REASON
Advanced age and comorbidities with risk for decompensation Advanced age and comorbidities with risk for acute decompensation

## 2022-02-04 NOTE — PRE-ANESTHESIA EVALUATION ADULT - NSANTHOSAYNRD_GEN_A_CORE
No. FLORENCIA screening performed.  STOP BANG Legend: 0-2 = LOW Risk; 3-4 = INTERMEDIATE Risk; 5-8 = HIGH Risk

## 2022-02-04 NOTE — CONSULT NOTE ADULT - TIME-BASED
Patient:   DMITRI AGUILAR            MRN: [id number]            FIN: [id number]               Age:   30 years     Sex:  FEMALE     :  [birth year]   Associated Diagnoses:   None   Author:   MARIZA SWANSON      Postoperative Information      Postoperative Information   Postoperative Information:          Preoperative Diagnosis:    LEFT SIDED FACIAL CELLULITIS/DENTAL ABSCESSES  Dental abscess of teeth #s 1, 2, 7, 8, 9, 10, 15, 16, 18, 32  2 centimeter periapical cyst of tooth #10.         Postoperative Diagnosis: same as pre-op,    LEFT SIDED FACIAL CELLULITIS/DENTAL ABSCESSES, LARGE PERIAPICAL CYST #10  .         Performed by: MARIZA SWANSON BRUNETTI-MD, JOHN (Surgeon - Primary)  .         Assistant: none,    .         Procedures Performed:    Tooth Extraction, EXTRACTION OF  ABSCESSED AND DECAYED TEETH  #1,2,7,8,9,10,15,16,18,32, EXCISION OF PERIAPICAL CYST TOOTH  #10, BONE GRAFT OF ANTERIOR MAXILLA  .         Findings: There was a 2 centimeter periapical cyst of tooth #10 with bone erosion.         Specimens Removed: none.         Estimated Blood Loss: 20  ml,    .         Blood Administered: No.         Complications: None.         Grafts/Implants: none.    Anesthetic utilized:  General,    General  PALMER LAGUNA (Supervisor)  KHURRAM RIOS (Provider)  .             Electronically Signed On 2017 21:48  __________________________________________________   MARIZA SWANSON     66

## 2022-02-04 NOTE — CONSULT NOTE ADULT - ATTENDING COMMENTS
70 y/o female, S/P Reversal Of Colostomy.  Acute postoperative pain.  Atelectasis.    PLAN:  - pain control   - encourage IS  - use O2 with NC as needed  - keep MAP>65; currently well perfused and euvolemic postoperatively  - NPO, NGT to suction  - follow serum electrolytes and UOP  - GI and DVT prophylaxis  - PT eval    Patient can go to surgical floor.  Please recall us as needed.    This note reflects my exam and care of this patient om 02/04/2022

## 2022-02-04 NOTE — ASU PATIENT PROFILE, ADULT - FALL HARM RISK - HARM RISK INTERVENTIONS

## 2022-02-04 NOTE — CHART NOTE - NSCHARTNOTEFT_GEN_A_CORE
Post Operative Note  Patient: PRIYANKA LEON 71y (1950) Female   MRN: [id number]  Location: [address]  Visit: 02-04-22 Inpatient  Date: 02-04-22 @ 22:27    Procedure: S/P colostomy     S/P Laparotomy, exploratory, adult    Lysis of peritoneal adhesions    Colectomy, left, partial    Closure, colostomy, with resection and colorectal anastomosis    Block, transversus abdominis plane, bilateral        Subjective: Ms. Leon is doing well after the operation with minimal pain, serosanguinous drainage from YELITZA, provena in place to suction, no nausea. no vomiting, NGT in place, Kan catheter in place with good urinary output.    Objective:  Vitals: T(F): 99.1 (02-04-22 @ 21:57), Max: 99.1 (02-04-22 @ 21:57)  HR: 81 (02-04-22 @ 21:57)  BP: 109/63 (02-04-22 @ 21:57) (96/60 - 121/72)  RR: 18 (02-04-22 @ 21:57)  SpO2: 99% (02-04-22 @ 21:00)  Vent Settings:     In:   02-04-22 @ 07:01  -  02-04-22 @ 22:27  --------------------------------------------------------  IN: 537.5 mL      IV Fluids: lactated ringers. 1000 milliLiter(s) (50 mL/Hr) IV Continuous <Continuous>      Out:   02-04-22 @ 07:01  -  02-04-22 @ 22:27  --------------------------------------------------------  OUT: 445 mL      EBL:     Voided Urine:   02-04-22 @ 07:01  -  02-04-22 @ 22:27  --------------------------------------------------------  OUT: 445 mL      Kan Catheter: yes no   Drains:   YELITZA:   02-04-22 @ 07:01  -  02-04-22 @ 22:27  --------------------------------------------------------  OUT: 150 mL     ,   Chest Tube:      NG Tube:       Physical Examination:  General Appearance: NAD  HEENT: EOMI, sclera non-icteric.  Heart: RRR  Lungs: CTABL  Abdomen:  Soft, moderately tender to palpation around incision and YELITZA drain, appropriate for post-op course, nondistended. No rigidity, guarding, or rebound tenderness.   MSK/Extremities: Warm & well-perfused. Peripheral pulses intact.  Skin: Warm, dry. No jaundice.   Incisions/Wounds: Dressings in place, clean, dry and intact, no signs of infection/active bleeding/drainage    Medications: [Standing]  acetaminophen     Tablet .. 650 milliGRAM(s) Oral every 6 hours  cefoTEtan  IVPB 2 Gram(s) IV Intermittent every 12 hours  chlorhexidine 4% Liquid 1 Application(s) Topical <User Schedule>  enoxaparin Injectable 40 milliGRAM(s) SubCutaneous daily  gabapentin 100 milliGRAM(s) Oral every 8 hours  lactated ringers. 1000 milliLiter(s) IV Continuous <Continuous>  levothyroxine 50 MICROGram(s) Oral daily  ondansetron Injectable 4 milliGRAM(s) IV Push every 6 hours PRN  pantoprazole  Injectable 40 milliGRAM(s) IV Push daily    Medications: [PRN]  acetaminophen     Tablet .. 650 milliGRAM(s) Oral every 6 hours  cefoTEtan  IVPB 2 Gram(s) IV Intermittent every 12 hours  chlorhexidine 4% Liquid 1 Application(s) Topical <User Schedule>  enoxaparin Injectable 40 milliGRAM(s) SubCutaneous daily  gabapentin 100 milliGRAM(s) Oral every 8 hours  lactated ringers. 1000 milliLiter(s) IV Continuous <Continuous>  levothyroxine 50 MICROGram(s) Oral daily  ondansetron Injectable 4 milliGRAM(s) IV Push every 6 hours PRN  pantoprazole  Injectable 40 milliGRAM(s) IV Push daily      DVT PROPHYLAXIS: enoxaparin Injectable 40 milliGRAM(s) SubCutaneous daily    GI PROPHYLAXIS: pantoprazole  Injectable 40 milliGRAM(s) IV Push daily    ANTICOAGULATION:   ANTIBIOTICS:  cefoTEtan  IVPB 2 Gram(s)        Labs:                        11.4   11.81 )-----------( 313      ( 04 Feb 2022 16:26 )             35.7     02-04    136  |  106  |  12  ----------------------------<  118<H>  4.1   |  17  |  0.6<L>    Ca    7.5<L>      04 Feb 2022 16:26  Phos  2.7     02-04  Mg     2.1     02-04    TPro  5.1<L>  /  Alb  3.1<L>  /  TBili  0.2  /  DBili  x   /  AST  16  /  ALT  11  /  AlkPhos  47  02-04    PT/INR - ( 04 Feb 2022 16:26 )   PT: 11.70 sec;   INR: 1.02 ratio         PTT - ( 04 Feb 2022 16:26 )  PTT:33.5 sec  CARDIAC MARKERS ( 04 Feb 2022 16:26 )  x     / <0.01 ng/mL / x     / x     / x            Imaging:  No post-op imaging studies    Assessment:  71yF s/p  reversal of colostomy.    Plan:    - F/U NGT outputs, keep to LCS  - Keep provena in place  - NPO  - Monitor vitals  - Monitor post-op labs and replete as necessary  - Monitor for bowel function  - Continue Pain Medications if necessary  - Continue Antibiotics if necessary  - Encourage ambulation as tolerated  - Monitor urine output and trial of void once Kan removed  - DVT and GI Prophylaxis  - Monitor wound and dressing for changes, redress as needed.      Date/Time: 02-04-22 @ 22:27

## 2022-02-04 NOTE — BRIEF OPERATIVE NOTE - OPERATION/FINDINGS
End descending colostomy resected with descending colon through left paramedian incision  Transverse colon to rectal anastomosis with EEA 28mm  Negative leak test

## 2022-02-04 NOTE — ASU PATIENT PROFILE, ADULT - PATIENT REPRESENTATIVE NAME
Patient is a 66y old  Male who presents with a chief complaint of LE weakness (15 Dec 2019 11:01)      INTERVAL HISTORY: feels ok    	  MEDICATIONS:  lisinopril 10 milliGRAM(s) Oral daily        PHYSICAL EXAM:  T(C): 36.3 (12-15-19 @ 21:37), Max: 36.8 (12-14-19 @ 21:56)  HR: 56 (12-15-19 @ 21:37) (56 - 76)  BP: 139/84 (12-15-19 @ 21:37) (88/61 - 139/84)  RR: 18 (12-15-19 @ 21:37) (17 - 19)  SpO2: 95% (12-15-19 @ 21:37) (92% - 96%)  Wt(kg): --  I&O's Summary    14 Dec 2019 07:01  -  15 Dec 2019 07:00  --------------------------------------------------------  IN: 1435 mL / OUT: 1275 mL / NET: 160 mL    15 Dec 2019 07:01  -  15 Dec 2019 21:43  --------------------------------------------------------  IN: 1200 mL / OUT: 1400 mL / NET: -200 mL          Appearance: In no distress	  HEENT:    PERRL, EOMI	  Cardiovascular:  S1 S2, No JVD  Respiratory: Lungs clear to auscultation	  Gastrointestinal:  Soft, Non-tender, + BS	  Extremities:  No edema of LE                                14.9   21.98 )-----------( 241      ( 15 Dec 2019 02:33 )             45.4     12-15    136  |  100  |  67<H>  ----------------------------<  115<H>  4.8   |  23  |  0.69    Ca    9.6      15 Dec 2019 02:33          Labs personally reviewed      Assessment and Plan:   Assessment:  · Assessment		  Pt is a 65 Y/O Male with PMHx smoker 50+yr 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 Per family the patient had MR at [facility] 3 weeks ago showing diffuse leptomeningeal spread (films unavail). Family is still shrouding patient from diagnosis/prognosis. Exam: oriented x3, FC, distal LE 4/5, proximal LE 2-3/5, UE 4/5. SILT. Endorses back pain.      Problem/Plan - 1:  ·  Problem: Cord compression.  Plan: Seen on MRI  Neurosurg eval appreciated  [facility] records reviewed likely with metastatic small cell lung ca  Onc and rad onc follow up noted    Problem/Plan - 2:  ·  Problem: Pulmonary embolism.  Plan: COnt lovenox   Monitor Hgb level   O 2 supplement.     Problem/Plan - 3:  ·  Problem: Chest pain, atypical.  Plan: does not appear to be cardiac in nature. ?2/2 PE   EKG with nsr and no ST changes    Problem/Plan - 4:  Problem: Prophylactic measure. Plan: DVT and gI PPX.    Discussed GOC with daughters at bedside 12/14, they are considering home hospice            Petey Roberson DO [facility]  Cardiovascular Medicine  [address], [address]  Office: [phone]  Cell: [phone] same name as above

## 2022-02-04 NOTE — BRIEF OPERATIVE NOTE - NSICDXBRIEFPROCEDURE_GEN_ALL_CORE_FT
PROCEDURES:  Laparotomy, exploratory, adult 04-Feb-2022 15:03:20  Kvng Parekh  Lysis of peritoneal adhesions 04-Feb-2022 15:03:35  Kvng Parekh  Colectomy, left, partial 04-Feb-2022 15:03:56  Kvng Parekh  Closure, colostomy, with resection and colorectal anastomosis 04-Feb-2022 15:04:23  Kvng Parekh  Block, transversus abdominis plane, bilateral 04-Feb-2022 15:07:53  Kvng Parekh

## 2022-02-04 NOTE — ASU PATIENT PROFILE, ADULT - MENTAL HEALTH CONDITIONS/SYMPTOMS, PROFILE
Nursing Transfer Note      12/12/2017     Transfer To: [id number]    Transfer via bed    Transfer with cardiac monitoring    Transported by CORRINE Marino RN    Medicines sent: none    Chart send with patient: yes    Notified: patient to notified family    Patient reassessed at: 12/12/17 1835 on arrival to unit    Upon arrival to floor: cardiac monitor applied, patient oriented to room, call bell in reach and bed in lowest position   none

## 2022-02-04 NOTE — CONSULT NOTE ADULT - SUBJECTIVE AND OBJECTIVE BOX
SICU Consultation Note  ======================================================================================================  PRIYANKA DOHERTY  N-[id number]      71y Female    HPI:      Consults-  Advanced Practice Nurse Consult-Wound Ostomy Care [CHARLIE Lr] (10-12-21)  Consult Note Adult-Infectious Disease Attending [ALIYA Post] (10-08-21)  Consult Note Adult-Physiatry Attending [OSMAR Landry] (10-07-21)  Advanced Practice Nurse Consult-Wound Ostomy Care [CHARLIE Lr] (10-07-21)  Consult Note Adult-SICU Resident/Attending [ALIYA Perrin] (10-06-21)  Consult Note Adult-Surgery Resident [ELENA Banks] (10-05-21)      Procedure:  OR Stats  OR Time:               EBL:          IV Fluids:       Blood Products:                UOP:      Findings-    PMH  PAST MEDICAL & SURGICAL HISTORY:  Hypothyroidism    HLD (hyperlipidemia)    S/P hysterectomy    H/O hernia repair  colosstomy        Home Meds:  Home Medications:  aspirin 81 mg oral delayed release tablet: 1 tab(s) orally once a day (04 Feb 2022 08:50)  Colace 100 mg oral capsule: 2 cap(s) orally once a day (04 Feb 2022 08:50)  levothyroxine 50 mcg (0.05 mg) oral capsule: 1 cap(s) orally once a day (04 Feb 2022 08:50)  multivitamin:  (04 Feb 2022 08:50)  Xyzal 5 mg oral tablet: 1 tab(s) orally once a day (in the evening) (04 Feb 2022 08:50)         Allergies  Allergies    Demerol (Unknown)    Intolerances         Current Medications:  BUpivacaine liposome 1.3% Injectable 20 milliLiter(s) Local Injection once  chlorhexidine 4% Liquid 1 Application(s) Topical <User Schedule>  enoxaparin Injectable 40 milliGRAM(s) SubCutaneous daily  fentaNYL    Injectable 25 MICROGram(s) IV Push every 5 minutes PRN Moderate Pain (4 - 6)  HYDROmorphone  Injectable 0.5 milliGRAM(s) IV Push every 15 minutes PRN Moderate Pain (4 - 6)  ketorolac   Injectable 30 milliGRAM(s) IV Push once PRN Moderate Pain (4 - 6)  lactated ringers. 1000 milliLiter(s) (50 mL/Hr) IV Continuous <Continuous>  lactated ringers. 1000 milliLiter(s) (100 mL/Hr) IV Continuous <Continuous>  morphine  - Injectable 4 milliGRAM(s) IV Push every 4 hours PRN Severe Pain (7 - 10)  ondansetron Injectable 4 milliGRAM(s) IV Push every 6 hours PRN Nausea  ondansetron Injectable 4 milliGRAM(s) IV Push every 30 minutes PRN Nausea and/or Vomiting  pantoprazole  Injectable 40 milliGRAM(s) IV Push daily      Advanced Directives: Presumed Full Code    VITAL SIGNS, INS/OUTS (Last 24hours):    ICU Vital Signs Last 24 Hrs  T(C): 36.2 (04 Feb 2022 10:12), Max: 36.2 (04 Feb 2022 08:56)  T(F): 97.1 (04 Feb 2022 08:56), Max: 97.1 (04 Feb 2022 08:56)  HR: 76 (04 Feb 2022 10:12) (76 - 76)  BP: 96/60 (04 Feb 2022 10:12) (96/60 - 96/60)  BP(mean): --  ABP: --  ABP(mean): --  RR: 15 (04 Feb 2022 10:12) (15 - 15)  SpO2: 100% (04 Feb 2022 10:12) (100% - 100%)    I&O's Summary      Height (cm): 157.5 (02-04-22)  Weight (kg): 45.4 (02-04-22)  BMI (kg/m2): 18.3 (02-04-22)  BSA (m2): 1.42 (02-04-22)    Physical Exam:   ---------------------------------------------------------------------------------------  GCS:      Exam: A&Ox3, no focal deficits    RESPIRATORY:  Normal expansion/effort  Mechanical Ventilation:     CARDIOVASCULAR:   S1/S2.  RRR  No peripheral edema    GASTROINTESTINAL:  Abdomen soft, non-tender, non-distended    MUSCULOSKELETAL:  Extremities warm, pink, well-perfused.    DERM:  No skin breakdown     :   Exam: Kan catheter in place.       Tubes/Lines/Drains   ----------------------------------------------------------------------------------------------------------  [x] Peripheral IV  [X] Central Venous Line          IJ/Femoral             Date Placed:    [X] Arterial Line		      Radial/Femoral    Date Placed:   [X] PICC:         	  [X] Midline		                                  Date Placed:   [X] Urinary Catheter Kan                                             Date Placed:       LABS  --------------------------------------------------------------------------------------  LFTs      Cardiac Markers        Coagulation      Arterial Blood Gas      Blood Sugar  CAPILLARY BLOOD GLUCOSE      POCT Blood Glucose.: 111 mg/dL (04 Feb 2022 15:36)  POCT Blood Glucose.: 91 mg/dL (04 Feb 2022 09:07)      Urinalysis          CT/XRAY/ECHO/TCD/EEG  ----------------------------------------------------------------------------------------------            --------------------------------------------------------------------------------------       SICU Consultation Note  ======================================================================================================  PRIYANKA DOHERTY  MRN-[id number]      71y Female    HPI: This is a 71 year old female with a pmhx significant for hypothyroid, hyperlipidemia hysterectomy, hernia repair and recent Gordon's procedure with colostomy creation on 12/7/2021 who presented today for colostomy reversal with Dr. Juarez.       Consults-  Advanced Practice Nurse Consult-Wound Ostomy Care [CHARLIE Lr] (10-12-21)  Consult Note Adult-Infectious Disease Attending [ALIYA Post] (10-08-21)  Consult Note Adult-Physiatry Attending [OSMAR Landry] (10-07-21)  Advanced Practice Nurse Consult-Wound Ostomy Care [CHARLIE Lr] (10-07-21)  Consult Note Adult-SICU Resident/Attending [ALIYA Perrin] (10-06-21)  Consult Note Adult-Surgery Resident [ELENA Banks] (10-05-21)      Procedure: End descending colostomy resected with descending colon through left paramedian incision. Transverse colon to rectal anastomosis with EEA 28mm. Negative leak test.  OR Stats  OR Time:       3.5 hour        EBL:    0ml      IV Fluids:  3L     Blood Products:       None         UOP:    60ml    PMH  PAST MEDICAL & SURGICAL HISTORY:  Hypothyroidism    HLD (hyperlipidemia)    S/P hysterectomy    H/O hernia repair    colostomy      Home Meds:  Home Medications:  aspirin 81 mg oral delayed release tablet: 1 tab(s) orally once a day (04 Feb 2022 08:50)  Colace 100 mg oral capsule: 2 cap(s) orally once a day (04 Feb 2022 08:50)  levothyroxine 50 mcg (0.05 mg) oral capsule: 1 cap(s) orally once a day (04 Feb 2022 08:50)  multivitamin:  (04 Feb 2022 08:50)  Xyzal 5 mg oral tablet: 1 tab(s) orally once a day (in the evening) (04 Feb 2022 08:50)      Allergies  Allergies    Demerol (Unknown)    Intolerances      Current Medications:  chlorhexidine 4% Liquid 1 Application(s) Topical <User Schedule>  enoxaparin Injectable 40 milliGRAM(s) SubCutaneous daily  morphine  - Injectable 4 milliGRAM(s) IV Push every 4 hours PRN Severe Pain (7 - 10)  ondansetron Injectable 4 milliGRAM(s) IV Push every 6 hours PRN Nausea  pantoprazole  Injectable 40 milliGRAM(s) IV Push daily    Advanced Directives: Presumed Full Code    VITAL SIGNS, INS/OUTS (Last 24hours):    ICU Vital Signs Last 24 Hrs  T(C): 36.2 (04 Feb 2022 10:12), Max: 36.2 (04 Feb 2022 08:56)  T(F): 97.1 (04 Feb 2022 08:56), Max: 97.1 (04 Feb 2022 08:56)  HR: 76 (04 Feb 2022 10:12) (76 - 76)  BP: 96/60 (04 Feb 2022 10:12) (96/60 - 96/60)  BP(mean): --  ABP: --  ABP(mean): --  RR: 15 (04 Feb 2022 10:12) (15 - 15)  SpO2: 100% (04 Feb 2022 10:12) (100% - 100%)    I&O's Summary    Height (cm): 157.5 (02-04-22)  Weight (kg): 45.4 (02-04-22)  BMI (kg/m2): 18.3 (02-04-22)  BSA (m2): 1.42 (02-04-22)    Physical Exam:   ---------------------------------------------------------------------------------------  GCS:      Exam: A&Ox3, no focal deficits    RESPIRATORY:  Normal expansion/effort  extubated post-op    CARDIOVASCULAR:   S1/S2.  RRR  No peripheral edema    GASTROINTESTINAL:  Abdomen soft, non-tender, non-distended  Wound vac to abdomen, YELITZA drain to pelvis    MUSCULOSKELETAL:  Extremities warm, pink, well-perfused.    DERM:  No skin breakdown  Wound vac to abdomen    :   Exam: Kan catheter in place      Tubes/Lines/Drains   ----------------------------------------------------------------------------------------------------------  [x] Peripheral IV  [X] Urinary Catheter Kan placed 2/4/2022  [X] NGT placed 2/4/2022  [x] YELITZA Drain to pelvis  [x] Wound vac to abdomen    Blood Sugar  CAPILLARY BLOOD GLUCOSE  POCT Blood Glucose.: 111 mg/dL (04 Feb 2022 15:36)  POCT Blood Glucose.: 91 mg/dL (04 Feb 2022 09:07) SICU Consultation Note  ======================================================================================================  PRIYANKA DOHERTY  MRN-[id number]    71y Female    HPI: This is a 71 year old female with a pmhx significant for hypothyroidism, hyperlipidemia, hysterectomy, hernia repair and recent Gordon's procedure with colostomy creation on 12/7/2021 who presented today for colostomy reversal with Dr. Juarez.     Procedure: End descending colostomy resected with descending colon through left paramedian incision. Transverse colon to rectal anastomosis with EEA 28mm. Negative leak test.  OR Stats  OR Time:  3.5 hour        EBL: 0ml      IV Fluids: 3L     Blood Products: None        UOP: 60ml    Consults-  Advanced Practice Nurse Consult-Wound Ostomy Care [CHARLIE Lr] (10-12-21)  Consult Note Adult-Infectious Disease Attending [ALIYA Post] (10-08-21)  Consult Note Adult-Physiatry Attending [OSMAR Landry] (10-07-21)  Advanced Practice Nurse Consult-Wound Ostomy Care [CHARLIE Lr] (10-07-21)  Consult Note Adult-SICU Resident/Attending [ALIYA Perrin] (10-06-21)  Consult Note Adult-Surgery Resident [ELENA Banks] (10-05-21)    PMH  PAST MEDICAL & SURGICAL HISTORY:  Hypothyroidism    HLD (hyperlipidemia)    S/P hysterectomy    H/O hernia repair    colostomy      Home Meds:  Home Medications:  aspirin 81 mg oral delayed release tablet: 1 tab(s) orally once a day (04 Feb 2022 08:50)  Colace 100 mg oral capsule: 2 cap(s) orally once a day (04 Feb 2022 08:50)  levothyroxine 50 mcg (0.05 mg) oral capsule: 1 cap(s) orally once a day (04 Feb 2022 08:50)  multivitamin:  (04 Feb 2022 08:50)  Xyzal 5 mg oral tablet: 1 tab(s) orally once a day (in the evening) (04 Feb 2022 08:50)    Allergies  Allergies  Demerol (Unknown)  Intolerances    Current Medications:  chlorhexidine 4% Liquid 1 Application(s) Topical <User Schedule>  enoxaparin Injectable 40 milliGRAM(s) SubCutaneous daily  morphine  - Injectable 4 milliGRAM(s) IV Push every 4 hours PRN Severe Pain (7 - 10)  ondansetron Injectable 4 milliGRAM(s) IV Push every 6 hours PRN Nausea  pantoprazole  Injectable 40 milliGRAM(s) IV Push daily    Advanced Directives: Presumed Full Code    VITAL SIGNS, INS/OUTS (Last 24hours):    ICU Vital Signs Last 24 Hrs  T(C): 36.2 (04 Feb 2022 10:12), Max: 36.2 (04 Feb 2022 08:56)  T(F): 97.1 (04 Feb 2022 08:56), Max: 97.1 (04 Feb 2022 08:56)  HR: 76 (04 Feb 2022 10:12) (76 - 76)  BP: 96/60 (04 Feb 2022 10:12) (96/60 - 96/60)  BP(mean): --  ABP: --  ABP(mean): --  RR: 15 (04 Feb 2022 10:12) (15 - 15)  SpO2: 100% (04 Feb 2022 10:12) (100% - 100%)    I&O's Summary    Height (cm): 157.5 (02-04-22)  Weight (kg): 45.4 (02-04-22)  BMI (kg/m2): 18.3 (02-04-22)  BSA (m2): 1.42 (02-04-22)    Physical Exam:   ---------------------------------------------------------------------------------------  GCS:      Exam: A&Ox3, no focal deficits    RESPIRATORY:  Normal expansion/effort  extubated post-op    CARDIOVASCULAR:   S1/S2.  RRR  No peripheral edema    GASTROINTESTINAL:  Abdomen soft, non-tender, non-distended  Wound vac to abdomen, YELITZA drain to pelvis    MUSCULOSKELETAL:  Extremities warm, pink, well-perfused.    DERM:  No skin breakdown  Wound vac to abdomen    :   Exam: Kan catheter in place      Tubes/Lines/Drains   ----------------------------------------------------------------------------------------------------------  [x] Peripheral IV  [X] Urinary Catheter Kan placed 2/4/2022  [X] NGT placed 2/4/2022  [x] YELITZA Drain to pelvis  [x] Wound vac to abdomen    Blood Sugar  CAPILLARY BLOOD GLUCOSE  POCT Blood Glucose.: 111 mg/dL (04 Feb 2022 15:36)  POCT Blood Glucose.: 91 mg/dL (04 Feb 2022 09:07) SICU Consultation Note  ======================================================================================================  PRIYANKA DOHERTY  MRN-[id number]    71y Female    HPI: This is a 71 year old female with a pmhx significant for hypothyroidism, hyperlipidemia, hysterectomy, hernia repair and recent Gordon's procedure with colostomy creation on 12/7/2021 who presented today for colostomy reversal with Dr. Juarez.     Procedure: End descending colostomy resected with descending colon through left paramedian incision. Transverse colon to rectal anastomosis with EEA 28mm. Negative leak test.  OR Stats  OR Time:  11:25am - 14:55pm        EBL: 50ml      IV Fluids: 3500ml     Blood Products: None        UOP: 60ml    Consults-  Advanced Practice Nurse Consult-Wound Ostomy Care [CHARLIE Lr] (10-12-21)  Consult Note Adult-Infectious Disease Attending [ALIYA Post] (10-08-21)  Consult Note Adult-Physiatry Attending [OSMAR Landry] (10-07-21)  Advanced Practice Nurse Consult-Wound Ostomy Care [CHARLIE Lr] (10-07-21)  Consult Note Adult-SICU Resident/Attending [ALIYA Perrin] (10-06-21)  Consult Note Adult-Surgery Resident [ELENA Banks] (10-05-21)    PM  PAST MEDICAL & SURGICAL HISTORY:  Hypothyroidism    HLD (hyperlipidemia)    S/P hysterectomy    H/O hernia repair    colostomy      Home Meds:  Home Medications:  aspirin 81 mg oral delayed release tablet: 1 tab(s) orally once a day (04 Feb 2022 08:50)  Colace 100 mg oral capsule: 2 cap(s) orally once a day (04 Feb 2022 08:50)  levothyroxine 50 mcg (0.05 mg) oral capsule: 1 cap(s) orally once a day (04 Feb 2022 08:50)  multivitamin:  (04 Feb 2022 08:50)  Xyzal 5 mg oral tablet: 1 tab(s) orally once a day (in the evening) (04 Feb 2022 08:50)    Allergies  Allergies  Demerol (Unknown)  Intolerances    Current Medications:  chlorhexidine 4% Liquid 1 Application(s) Topical <User Schedule>  enoxaparin Injectable 40 milliGRAM(s) SubCutaneous daily  morphine  - Injectable 4 milliGRAM(s) IV Push every 4 hours PRN Severe Pain (7 - 10)  ondansetron Injectable 4 milliGRAM(s) IV Push every 6 hours PRN Nausea  pantoprazole  Injectable 40 milliGRAM(s) IV Push daily    Advanced Directives: Presumed Full Code    VITAL SIGNS, INS/OUTS (Last 24hours):    ICU Vital Signs Last 24 Hrs  T(C): 36.2 (04 Feb 2022 10:12), Max: 36.2 (04 Feb 2022 08:56)  T(F): 97.1 (04 Feb 2022 08:56), Max: 97.1 (04 Feb 2022 08:56)  HR: 76 (04 Feb 2022 10:12) (76 - 76)  BP: 96/60 (04 Feb 2022 10:12) (96/60 - 96/60)  BP(mean): --  ABP: --  ABP(mean): --  RR: 15 (04 Feb 2022 10:12) (15 - 15)  SpO2: 100% (04 Feb 2022 10:12) (100% - 100%)    I&O's Summary    Height (cm): 157.5 (02-04-22)  Weight (kg): 45.4 (02-04-22)  BMI (kg/m2): 18.3 (02-04-22)  BSA (m2): 1.42 (02-04-22)    Physical Exam:   ---------------------------------------------------------------------------------------  GCS:      Exam: A&Ox3, no focal deficits    RESPIRATORY:  Normal expansion/effort  extubated post-op    CARDIOVASCULAR:   S1/S2.  RRR  No peripheral edema    GASTROINTESTINAL:  Abdomen soft, non-tender, non-distended  Wound vac to abdomen, YELITZA drain to pelvis    MUSCULOSKELETAL:  Extremities warm, pink, well-perfused.    DERM:  No skin breakdown  Wound vac to abdomen    :   Exam: Kan catheter in place      Tubes/Lines/Drains   ----------------------------------------------------------------------------------------------------------  [x] Peripheral IV  [X] Urinary Catheter Kan placed 2/4/2022  [X] NGT placed 2/4/2022  [x] YELITZA Drain to pelvis  [x] Wound vac to abdomen    Blood Sugar  CAPILLARY BLOOD GLUCOSE  POCT Blood Glucose.: 111 mg/dL (04 Feb 2022 15:36)  POCT Blood Glucose.: 91 mg/dL (04 Feb 2022 09:07) SICU Consultation Note  ======================================================================================================  PRIYANKA DOHERTY  MRN-[id number]    71y Female    HPI: This is a 71 year old female with a pmhx significant for hypothyroidism, hyperlipidemia, hysterectomy, hernia repair and recent Gordon's procedure with colostomy creation on 12/7/2021 who presented today for colostomy reversal with Dr. Juarez.     Procedure: End descending colostomy resected with descending colon through left paramedian incision. Transverse colon to rectal anastomosis with EEA 28mm. Negative leak test.  OR Stats  OR Time:  11:25am - 14:55pm        EBL: 50ml      IV Fluids: 3500ml     Blood Products: None        UOP: 60ml    Consults-  Advanced Practice Nurse Consult-Wound Ostomy Care [CHARLIE Lr] (10-12-21)  Consult Note Adult-Infectious Disease Attending [ALIYA Post] (10-08-21)  Consult Note Adult-Physiatry Attending [OSMAR Landry] (10-07-21)  Advanced Practice Nurse Consult-Wound Ostomy Care [CHARLIE Lr] (10-07-21)  Consult Note Adult-SICU Resident/Attending [ALIYA Perrin] (10-06-21)  Consult Note Adult-Surgery Resident [ELENA Banks] (10-05-21)    PM  PAST MEDICAL & SURGICAL HISTORY:  Hypothyroidism    HLD (hyperlipidemia)    S/P hysterectomy    H/O hernia repair    colostomy      Home Meds:  Home Medications:  aspirin 81 mg oral delayed release tablet: 1 tab(s) orally once a day (04 Feb 2022 08:50)  Colace 100 mg oral capsule: 2 cap(s) orally once a day (04 Feb 2022 08:50)  levothyroxine 50 mcg (0.05 mg) oral capsule: 1 cap(s) orally once a day (04 Feb 2022 08:50)  multivitamin:  (04 Feb 2022 08:50)  Xyzal 5 mg oral tablet: 1 tab(s) orally once a day (in the evening) (04 Feb 2022 08:50)    Allergies  Allergies  Demerol (Unknown)  Intolerances    Current Medications:  chlorhexidine 4% Liquid 1 Application(s) Topical <User Schedule>  enoxaparin Injectable 40 milliGRAM(s) SubCutaneous daily  ondansetron Injectable 4 milliGRAM(s) IV Push every 6 hours PRN Nausea  pantoprazole  Injectable 40 milliGRAM(s) IV Push daily    Advanced Directives: Presumed Full Code    VITAL SIGNS, INS/OUTS (Last 24hours):    ICU Vital Signs Last 24 Hrs  T(C): 36.2 (04 Feb 2022 10:12), Max: 36.2 (04 Feb 2022 08:56)  T(F): 97.1 (04 Feb 2022 08:56), Max: 97.1 (04 Feb 2022 08:56)  HR: 76 (04 Feb 2022 10:12) (76 - 76)  BP: 96/60 (04 Feb 2022 10:12) (96/60 - 96/60)  BP(mean): --  ABP: --  ABP(mean): --  RR: 15 (04 Feb 2022 10:12) (15 - 15)  SpO2: 100% (04 Feb 2022 10:12) (100% - 100%)    I&O's Summary    Height (cm): 157.5 (02-04-22)  Weight (kg): 45.4 (02-04-22)  BMI (kg/m2): 18.3 (02-04-22)  BSA (m2): 1.42 (02-04-22)    Physical Exam:   ---------------------------------------------------------------------------------------  GCS:    15  Exam: A&Ox3, no focal deficits    RESPIRATORY:  Normal expansion/effort  extubated post-op, currently on RA    CARDIOVASCULAR:   S1/S2.  RRR  No peripheral edema    GASTROINTESTINAL:  Abdomen soft, non-tender, non-distended  Wound vac to abdomen, YELITZA drain to pelvis draining serosang fluid    MUSCULOSKELETAL:  Extremities warm, pink, well-perfused.    DERM:  No skin breakdown  Wound vac to abdomen    :   Exam: Kan catheter in place - 80ml clear yellow urine in urometer at this time      Tubes/Lines/Drains   ----------------------------------------------------------------------------------------------------------  [x] Peripheral IV  [X] Urinary Catheter Kan placed 2/4/2022  [X] NGT placed 2/4/2022  [x] YELITZA Drain to pelvis  [x] Wound vac to abdomen    Blood Sugar  CAPILLARY BLOOD GLUCOSE  POCT Blood Glucose.: 111 mg/dL (04 Feb 2022 15:36)  POCT Blood Glucose.: 91 mg/dL (04 Feb 2022 09:07)

## 2022-02-04 NOTE — CHART NOTE - NSCHARTNOTEFT_GEN_A_CORE
PACU ANESTHESIA ADMISSION NOTE      Procedure: Laparotomy, exploratory, adult    Lysis of peritoneal adhesions    Colectomy, left, partial    Closure, colostomy, with resection and colorectal anastomosis    Block, transversus abdominis plane, bilateral      Post op diagnosis:  S/P colostomy        ____  Intubated  TV:______       Rate: ______      FiO2: ______    _x___  Patent Airway    _x___  Full return of protective reflexes    _x___  Full recovery from anesthesia / back to baseline status    Vitals:  T 97 f  HR: 77  BP: 103/72  RR: 16  SpO2: 100% on FM 8 L/min  Mental Status:  _x___ Awake   _____ Alert   __x___ Drowsy   _____ Sedated    Nausea/Vomiting:  _x___  NO       ______Yes,   See Post - Op Orders         Pain Scale (0-10):  __0___    Treatment: _x___ None    ____ See Post - Op/PCA Orders    Post - Operative Fluids:   ____ Oral   _x___ See Post - Op Orders    Plan: Discharge:   ____Home       __x___Floor     _____Critical Care    _____  Other:_________________    Comments: uneventful GETA, VSS, full report to pacu rn  No anesthesia issues or complications noted.  Discharge when criteria met.

## 2022-02-05 LAB
CK MB CFR SERPL CALC: 2.3 NG/ML — SIGNIFICANT CHANGE UP (ref 0.6–6.3)
CK SERPL-CCNC: 585 U/L — HIGH (ref 0–225)
GLUCOSE BLDC GLUCOMTR-MCNC: 116 MG/DL — HIGH (ref 70–99)
TROPONIN T SERPL-MCNC: <0.01 NG/ML — SIGNIFICANT CHANGE UP

## 2022-02-05 PROCEDURE — 71045 X-RAY EXAM CHEST 1 VIEW: CPT | Mod: 26

## 2022-02-05 RX ORDER — KETOROLAC TROMETHAMINE 30 MG/ML
15 SYRINGE (ML) INJECTION EVERY 6 HOURS
Refills: 0 | Status: DISCONTINUED | OUTPATIENT
Start: 2022-02-05 | End: 2022-02-10

## 2022-02-05 RX ORDER — SODIUM CHLORIDE 9 MG/ML
1000 INJECTION, SOLUTION INTRAVENOUS
Refills: 0 | Status: DISCONTINUED | OUTPATIENT
Start: 2022-02-05 | End: 2022-02-07

## 2022-02-05 RX ADMIN — Medication 650 MILLIGRAM(S): at 06:10

## 2022-02-05 RX ADMIN — GABAPENTIN 100 MILLIGRAM(S): 400 CAPSULE ORAL at 21:01

## 2022-02-05 RX ADMIN — GABAPENTIN 100 MILLIGRAM(S): 400 CAPSULE ORAL at 13:50

## 2022-02-05 RX ADMIN — Medication 15 MILLIGRAM(S): at 21:03

## 2022-02-05 RX ADMIN — CHLORHEXIDINE GLUCONATE 1 APPLICATION(S): 213 SOLUTION TOPICAL at 06:10

## 2022-02-05 RX ADMIN — Medication 650 MILLIGRAM(S): at 11:34

## 2022-02-05 RX ADMIN — ONDANSETRON 4 MILLIGRAM(S): 8 TABLET, FILM COATED ORAL at 04:42

## 2022-02-05 RX ADMIN — Medication 650 MILLIGRAM(S): at 01:03

## 2022-02-05 RX ADMIN — Medication 15 MILLIGRAM(S): at 05:12

## 2022-02-05 RX ADMIN — Medication 650 MILLIGRAM(S): at 17:27

## 2022-02-05 RX ADMIN — Medication 50 MICROGRAM(S): at 06:10

## 2022-02-05 RX ADMIN — Medication 100 GRAM(S): at 13:45

## 2022-02-05 RX ADMIN — Medication 15 MILLIGRAM(S): at 06:10

## 2022-02-05 RX ADMIN — Medication 650 MILLIGRAM(S): at 12:04

## 2022-02-05 RX ADMIN — PANTOPRAZOLE SODIUM 40 MILLIGRAM(S): 20 TABLET, DELAYED RELEASE ORAL at 11:35

## 2022-02-05 RX ADMIN — GABAPENTIN 100 MILLIGRAM(S): 400 CAPSULE ORAL at 06:10

## 2022-02-05 RX ADMIN — ENOXAPARIN SODIUM 40 MILLIGRAM(S): 100 INJECTION SUBCUTANEOUS at 11:34

## 2022-02-05 NOTE — PROGRESS NOTE ADULT - ASSESSMENT
Assessment:  71yF pmh 10/6/2021 pt had emergency colostomy placement with 16 pound weight loss s/p  reversal of colostomy.    Plan:    - F/U NGT outputs, keep to LCS  - Keep provena in place  - NPO  - Monitor vitals  - Monitor post-op labs and replete as necessary  - Monitor for bowel function  - Continue Pain Medications if necessary  - Continue Antibiotics if necessary  - Encourage ambulation as tolerated  - Monitor urine output and trial of void once Kan removed  - DVT and GI Prophylaxis  - Monitor wound and dressing for changes, redress as needed.      Spectra: [id number]

## 2022-02-05 NOTE — PROGRESS NOTE ADULT - SUBJECTIVE AND OBJECTIVE BOX
GENERAL SURGERY PROGRESS NOTE    Patient: PRIYANKA LEON , 71y ([birth year])Female   MRN: [id number]  Location: [address]  Visit: 02-04-22 Inpatient  Date: 02-05-22 @ 03:35    Hospital Day #: 2  Post-Op Day #: 1    Procedure/Dx/Injuries: s/p reversal of herman's    Events of past 24 hours: Ms. Leon is doing well after the operation with minimal pain, serosanguinous drainage from YELITZA, provena in place to suction, no nausea. no vomiting, NGT in place, Kan catheter in place with good urinary output.    PAST MEDICAL & SURGICAL HISTORY:  Hypothyroidism    HLD (hyperlipidemia)    S/P hysterectomy    H/O hernia repair  colosstomy        Vitals:   T(F): 99.1 (02-04-22 @ 21:57), Max: 99.1 (02-04-22 @ 21:57)  HR: 81 (02-04-22 @ 21:57)  BP: 109/63 (02-04-22 @ 21:57)  RR: 18 (02-04-22 @ 21:57)  SpO2: 99% (02-04-22 @ 21:00)      Diet, NPO:   Except Medications      Fluids: lactated ringers.: Solution, 1000 milliLiter(s) infuse at 50 mL/Hr      I & O's:        General Appearance: NAD  HEENT: EOMI, sclera non-icteric.  Heart: RRR  Lungs: CTABL  Abdomen:  Soft, moderately tender to palpation around incision and YELITZA drain, appropriate for post-op course, nondistended. No rigidity, guarding, or rebound tenderness.   MSK/Extremities: Warm & well-perfused. Peripheral pulses intact.  Skin: Warm, dry. No jaundice.   Incisions/Wounds: Dressings in place, clean, dry and intact, no signs of infection/active bleeding/drainage    MEDICATIONS  (STANDING):  acetaminophen     Tablet .. 650 milliGRAM(s) Oral every 6 hours  cefoTEtan  IVPB 2 Gram(s) IV Intermittent every 12 hours  chlorhexidine 4% Liquid 1 Application(s) Topical <User Schedule>  enoxaparin Injectable 40 milliGRAM(s) SubCutaneous daily  gabapentin 100 milliGRAM(s) Oral every 8 hours  lactated ringers. 1000 milliLiter(s) (50 mL/Hr) IV Continuous <Continuous>  levothyroxine 50 MICROGram(s) Oral daily  pantoprazole  Injectable 40 milliGRAM(s) IV Push daily    MEDICATIONS  (PRN):  ketorolac   Injectable 15 milliGRAM(s) IV Push every 6 hours PRN Moderate Pain (4 - 6)  ondansetron Injectable 4 milliGRAM(s) IV Push every 6 hours PRN Nausea      DVT PROPHYLAXIS: enoxaparin Injectable 40 milliGRAM(s) SubCutaneous daily    GI PROPHYLAXIS: pantoprazole  Injectable 40 milliGRAM(s) IV Push daily    ANTICOAGULATION:   ANTIBIOTICS:  cefoTEtan  IVPB 2 Gram(s)            LAB/STUDIES:  Labs:  CAPILLARY BLOOD GLUCOSE      POCT Blood Glucose.: 111 mg/dL (04 Feb 2022 15:36)  POCT Blood Glucose.: 91 mg/dL (04 Feb 2022 09:07)                          11.4   11.81 )-----------( 313      ( 04 Feb 2022 16:26 )             35.7       Auto Neutrophil %: 88.8 % (02-04-22 @ 16:26)  Auto Immature Granulocyte %: 0.3 % (02-04-22 @ 16:26)    02-04    136  |  106  |  12  ----------------------------<  118<H>  4.1   |  17  |  0.6<L>      Calcium, Total Serum: 7.5 mg/dL (02-04-22 @ 16:26)      LFTs:             5.1  | 0.2  | 16       ------------------[47      ( 04 Feb 2022 16:26 )  3.1  | x    | 11          Lipase:x      Amylase:x         Lactate, Blood: 1.4 mmol/L (02-04-22 @ 16:26)      Coags:     11.70  ----< 1.02    ( 04 Feb 2022 16:26 )     33.5        CARDIAC MARKERS ( 04 Feb 2022 16:26 )  x     / <0.01 ng/mL / x     / x     / x

## 2022-02-05 NOTE — CONSULT NOTE ADULT - SUBJECTIVE AND OBJECTIVE BOX
HPI:72 y/o  f  ptn  referred to acute  rehab  for  eval and  tx  sp  colostomy  reversion ptn  is  post  op  aox3  nad  fu  command  seen at  bed side  low  in  place  has  foey in  place  no new  c.o  pmh  as  below prior  to  admitting  ptn was  indep adl  tf and ambulation      PTN  REFERRED TO ACUTE  REHAB  FOR  EVAL AND  TX   PAST MEDICAL & SURGICAL HISTORY:  Hypothyroidism    HLD (hyperlipidemia)    S/P hysterectomy    H/O hernia repair  colosstomy        Hospital Course:    TODAY'S SUBJECTIVE & REVIEW OF SYMPTOMS:     Constitutional WNL   Cardio WNL   Resp WNL   GI WNL  Heme WNL  Endo WNL  Skin WNL  MSK WNL  Neuro WNL  Cognitive WNL  Psych WNL      MEDICATIONS  (STANDING):  acetaminophen     Tablet .. 650 milliGRAM(s) Oral every 6 hours  cefoTEtan  IVPB 2 Gram(s) IV Intermittent every 12 hours  chlorhexidine 4% Liquid 1 Application(s) Topical <User Schedule>  dextrose 5% + sodium chloride 0.9%. 1000 milliLiter(s) (75 mL/Hr) IV Continuous <Continuous>  enoxaparin Injectable 40 milliGRAM(s) SubCutaneous daily  gabapentin 100 milliGRAM(s) Oral every 8 hours  levothyroxine 50 MICROGram(s) Oral daily  pantoprazole  Injectable 40 milliGRAM(s) IV Push daily    MEDICATIONS  (PRN):  ketorolac   Injectable 15 milliGRAM(s) IV Push every 6 hours PRN Moderate Pain (4 - 6)  ondansetron Injectable 4 milliGRAM(s) IV Push every 6 hours PRN Nausea      FAMILY HISTORY:      Allergies    Demerol (Unknown)    Intolerances        SOCIAL HISTORY:    [  ] Etoh  [  ] Smoking  [  ] Substance abuse     Home Environment:  [ x ] Home Alone  [  ] Lives with Family  [  ] Home Health Aid    Dwelling:  [  ] Apartment  [x  ] Private House  [  ] Adult Home  [  ] Skilled Nursing Facility      [  ] Short Term  [  ] Long Term  [x ] Stairs       Elevator [  ]    FUNCTIONAL STATUS PTA: (Check all that apply)  Ambulation: [x   ]Independent    [  ] Dependent     [  ] Non-Ambulatory  Assistive Device: [  ] SA Cane  [  ]  Q Cane  [  ] Walker  [  ]  Wheelchair  ADL : [  x] Independent  [  ]  Dependent       Vital Signs Last 24 Hrs  T(C): 37 (05 Feb 2022 05:10), Max: 37.3 (04 Feb 2022 21:57)  T(F): 98.6 (05 Feb 2022 05:10), Max: 99.1 (04 Feb 2022 21:57)  HR: 78 (05 Feb 2022 05:10) (67 - 86)  BP: 98/59 (05 Feb 2022 05:10) (98/59 - 121/72)  BP(mean): --  RR: 18 (04 Feb 2022 21:57) (12 - 20)  SpO2: 99% (04 Feb 2022 21:00) (98% - 100%)      PHYSICAL EXAM: Alert & Oriented X3  GENERAL: NAD, well-groomed, well-developed  HEAD:  Atraumatic, Normocephalic  EYES: EOMI, PERRLA, conjunctiva and sclera clear  NECK: Supple, No JVD, Normal thyroid  CHEST/LUNG: Clear to percussion bilaterally; No rales, rhonchi, wheezing, or rubs  HEART: Regular rate and rhythm; No murmurs, rubs, or gallops  ABDOMEN: Soft, Nontender, Nondistended; Bowel sounds present  EXTREMITIES:  2+ Peripheral Pulses, No clubbing, cyanosis, or edema    NERVOUS SYSTEM:  Cranial Nerves 2-12 intact [ x ] Abnormal  [  ]  ROM: WFL all extremities [ x ]  Abnormal [  ]  Motor Strength: WFL all extremities  [x]  Abnormal [  ]  Sensation: intact to light touch [x  ] Abnormal [  ]  Reflexes: Symmetric [ x ]  Abnormal [  ]    FUNCTIONAL STATUS:  Bed Mobility: Independent [  ]  Supervision [  ]  Needs Assistance [  ]  N/A [ x ]  Transfers: Independent [  ]  Supervision [  ]  Needs Assistance [  ]  N/A [ x ]   Ambulation: Independent [  ]  Supervision [  ]  Needs Assistance [  ]  N/A [x ]  ADL: Independent [  ] Requires Assistance [  ] N/A [x  ]  SEE PT/OT IE NOTES    LABS:                        11.4   11.81 )-----------( 313      ( 04 Feb 2022 16:26 )             35.7     02-04    136  |  106  |  12  ----------------------------<  118<H>  4.1   |  17  |  0.6<L>    Ca    7.5<L>      04 Feb 2022 16:26  Phos  2.7     02-04  Mg     2.1     02-04    TPro  5.1<L>  /  Alb  3.1<L>  /  TBili  0.2  /  DBili  x   /  AST  16  /  ALT  11  /  AlkPhos  47  02-04    PT/INR - ( 04 Feb 2022 16:26 )   PT: 11.70 sec;   INR: 1.02 ratio         PTT - ( 04 Feb 2022 16:26 )  PTT:33.5 sec      RADIOLOGY & ADDITIONAL STUDIES:    Assesment:

## 2022-02-05 NOTE — ANESTHESIA FOLLOW-UP NOTE - NSPROCEDUREFT_GEN_ALL_CORE
End descending colostomy resected with descending colon through left paramedian incision. Transverse colon to rectal anastomosis with EEA 28mm. Negative leak test.  End descending colostomy resected with descending colon through left paramedian incision. Transverse colon to rectal anastomosis with EEA 28mm. Negative leak test.  End descending colostomy resected with descending colon through left paramedian incision. Transverse colon to rectal anastomosis with EEA 28mm. Negative leak test.  End descending colostomy resected with descending colon through left paramedian incision. Transverse colon to rectal anastomosis with EEA 28mm. Negative leak test.  End descending colostomy resected with descending colon through left paramedian incision. Transverse colon to rectal anastomosis with EEA 28mm. Negative leak test.  End descending colostomy resected with descending colon through left paramedian incision. Transverse colon to rectal anastomosis with EEA 28mm. Negative leak test.  End descending colostomy resected with descending colon through left paramedian incision. Transverse colon to rectal anastomosis with EEA 28mm. Negative leak test.  End descending colostomy resected with descending colon through left paramedian incision. Transverse colon to rectal anastomosis with EEA 28mm. Negative leak test.

## 2022-02-05 NOTE — CONSULT NOTE ADULT - ASSESSMENT
IMPRESSION: Rehab of 72 y/o  f  rehab  for  debility  sp  colostomy  revision      PRECAUTIONS: [  ] Cardiac  [  ] Respiratory  [  ] Seizures [  ] Contact Isolation  [  ] Droplet Isolation  [ FALL ] Other    Weight Bearing Status:     RECOMMENDATION:    Out of Bed to Chair     DVT/Decubiti Prophylaxis    REHAB PLAN:     [  xx ] Bedside P/T 3-5 times a week   [   ]   Bedside O/T  2-3 times a week             [   ] No Rehab Therapy Indicated                   [   ]  Speech Therapy   Conditioning/ROM                                    ADL  Bed Mobility                                               Conditioning/ROM  Transfers                                                     Bed Mobility  Sitting /Standing Balance                         Transfers                                        Gait Training                                               Sitting/Standing Balance  Stair Training [   ]Applicable                    Home equipment Eval                                                                        Splinting  [   ] Only      GOALS:   ADL   [ x  ]   Independent                    Transfers  [  x ] Independent                          Ambulation  [   x] Independent     [    ] With device                            [   ]  CG                                                         [   ]  CG                                                                  [   ] CG                            [    ] Min A                                                   [   ] Min A                                                              [   ] Min  A          DISCHARGE PLAN:   [   ]  Good candidate for Intensive Rehabilitation/Hospital based-4A [facility]                                             Will tolerate 3hrs Intensive Rehab Daily                                       [    ]  Short Term Rehab in Skilled Nursing Facility                                       [ xx   ]  Home with Outpatient or VN services                                         [    ]  Possible Candidate for Intensive Hospital based Rehab

## 2022-02-06 LAB
ANION GAP SERPL CALC-SCNC: 14 MMOL/L — SIGNIFICANT CHANGE UP (ref 7–14)
ANION GAP SERPL CALC-SCNC: 6 MMOL/L — LOW (ref 7–14)
BASOPHILS # BLD AUTO: 0.02 K/UL — SIGNIFICANT CHANGE UP (ref 0–0.2)
BASOPHILS # BLD AUTO: 0.03 K/UL — SIGNIFICANT CHANGE UP (ref 0–0.2)
BASOPHILS NFR BLD AUTO: 0.3 % — SIGNIFICANT CHANGE UP (ref 0–1)
BASOPHILS NFR BLD AUTO: 0.5 % — SIGNIFICANT CHANGE UP (ref 0–1)
BUN SERPL-MCNC: 3 MG/DL — LOW (ref 10–20)
BUN SERPL-MCNC: 7 MG/DL — LOW (ref 10–20)
CALCIUM SERPL-MCNC: 7.1 MG/DL — LOW (ref 8.5–10.1)
CALCIUM SERPL-MCNC: 7.1 MG/DL — LOW (ref 8.5–10.1)
CHLORIDE SERPL-SCNC: 108 MMOL/L — SIGNIFICANT CHANGE UP (ref 98–110)
CHLORIDE SERPL-SCNC: 110 MMOL/L — SIGNIFICANT CHANGE UP (ref 98–110)
CO2 SERPL-SCNC: 18 MMOL/L — SIGNIFICANT CHANGE UP (ref 17–32)
CO2 SERPL-SCNC: 23 MMOL/L — SIGNIFICANT CHANGE UP (ref 17–32)
CREAT SERPL-MCNC: 0.5 MG/DL — LOW (ref 0.7–1.5)
CREAT SERPL-MCNC: <0.5 MG/DL — LOW (ref 0.7–1.5)
EOSINOPHIL # BLD AUTO: 0.06 K/UL — SIGNIFICANT CHANGE UP (ref 0–0.7)
EOSINOPHIL # BLD AUTO: 0.14 K/UL — SIGNIFICANT CHANGE UP (ref 0–0.7)
EOSINOPHIL NFR BLD AUTO: 0.8 % — SIGNIFICANT CHANGE UP (ref 0–8)
EOSINOPHIL NFR BLD AUTO: 2.3 % — SIGNIFICANT CHANGE UP (ref 0–8)
GLUCOSE SERPL-MCNC: 89 MG/DL — SIGNIFICANT CHANGE UP (ref 70–99)
GLUCOSE SERPL-MCNC: 98 MG/DL — SIGNIFICANT CHANGE UP (ref 70–99)
HCT VFR BLD CALC: 25.1 % — LOW (ref 37–47)
HCT VFR BLD CALC: 27.4 % — LOW (ref 37–47)
HGB BLD-MCNC: 8.2 G/DL — LOW (ref 12–16)
HGB BLD-MCNC: 9 G/DL — LOW (ref 12–16)
IMM GRANULOCYTES NFR BLD AUTO: 0.2 % — SIGNIFICANT CHANGE UP (ref 0.1–0.3)
IMM GRANULOCYTES NFR BLD AUTO: 0.3 % — SIGNIFICANT CHANGE UP (ref 0.1–0.3)
LYMPHOCYTES # BLD AUTO: 1.55 K/UL — SIGNIFICANT CHANGE UP (ref 1.2–3.4)
LYMPHOCYTES # BLD AUTO: 1.74 K/UL — SIGNIFICANT CHANGE UP (ref 1.2–3.4)
LYMPHOCYTES # BLD AUTO: 23.9 % — SIGNIFICANT CHANGE UP (ref 20.5–51.1)
LYMPHOCYTES # BLD AUTO: 25.1 % — SIGNIFICANT CHANGE UP (ref 20.5–51.1)
MAGNESIUM SERPL-MCNC: 2.1 MG/DL — SIGNIFICANT CHANGE UP (ref 1.8–2.4)
MAGNESIUM SERPL-MCNC: 2.2 MG/DL — SIGNIFICANT CHANGE UP (ref 1.8–2.4)
MCHC RBC-ENTMCNC: 28.3 PG — SIGNIFICANT CHANGE UP (ref 27–31)
MCHC RBC-ENTMCNC: 28.8 PG — SIGNIFICANT CHANGE UP (ref 27–31)
MCHC RBC-ENTMCNC: 32.7 G/DL — SIGNIFICANT CHANGE UP (ref 32–37)
MCHC RBC-ENTMCNC: 32.8 G/DL — SIGNIFICANT CHANGE UP (ref 32–37)
MCV RBC AUTO: 86.6 FL — SIGNIFICANT CHANGE UP (ref 81–99)
MCV RBC AUTO: 87.5 FL — SIGNIFICANT CHANGE UP (ref 81–99)
MONOCYTES # BLD AUTO: 0.46 K/UL — SIGNIFICANT CHANGE UP (ref 0.1–0.6)
MONOCYTES # BLD AUTO: 0.61 K/UL — HIGH (ref 0.1–0.6)
MONOCYTES NFR BLD AUTO: 7.4 % — SIGNIFICANT CHANGE UP (ref 1.7–9.3)
MONOCYTES NFR BLD AUTO: 8.4 % — SIGNIFICANT CHANGE UP (ref 1.7–9.3)
NEUTROPHILS # BLD AUTO: 3.99 K/UL — SIGNIFICANT CHANGE UP (ref 1.4–6.5)
NEUTROPHILS # BLD AUTO: 4.82 K/UL — SIGNIFICANT CHANGE UP (ref 1.4–6.5)
NEUTROPHILS NFR BLD AUTO: 64.5 % — SIGNIFICANT CHANGE UP (ref 42.2–75.2)
NEUTROPHILS NFR BLD AUTO: 66.3 % — SIGNIFICANT CHANGE UP (ref 42.2–75.2)
NRBC # BLD: 0 /100 WBCS — SIGNIFICANT CHANGE UP (ref 0–0)
NRBC # BLD: 0 /100 WBCS — SIGNIFICANT CHANGE UP (ref 0–0)
PHOSPHATE SERPL-MCNC: 1.1 MG/DL — LOW (ref 2.1–4.9)
PHOSPHATE SERPL-MCNC: 1.8 MG/DL — LOW (ref 2.1–4.9)
PLATELET # BLD AUTO: 280 K/UL — SIGNIFICANT CHANGE UP (ref 130–400)
PLATELET # BLD AUTO: 282 K/UL — SIGNIFICANT CHANGE UP (ref 130–400)
POTASSIUM SERPL-MCNC: 3.6 MMOL/L — SIGNIFICANT CHANGE UP (ref 3.5–5)
POTASSIUM SERPL-MCNC: 4 MMOL/L — SIGNIFICANT CHANGE UP (ref 3.5–5)
POTASSIUM SERPL-SCNC: 3.6 MMOL/L — SIGNIFICANT CHANGE UP (ref 3.5–5)
POTASSIUM SERPL-SCNC: 4 MMOL/L — SIGNIFICANT CHANGE UP (ref 3.5–5)
RBC # BLD: 2.9 M/UL — LOW (ref 4.2–5.4)
RBC # BLD: 3.13 M/UL — LOW (ref 4.2–5.4)
RBC # FLD: 14.1 % — SIGNIFICANT CHANGE UP (ref 11.5–14.5)
RBC # FLD: 14.1 % — SIGNIFICANT CHANGE UP (ref 11.5–14.5)
SODIUM SERPL-SCNC: 137 MMOL/L — SIGNIFICANT CHANGE UP (ref 135–146)
SODIUM SERPL-SCNC: 142 MMOL/L — SIGNIFICANT CHANGE UP (ref 135–146)
WBC # BLD: 6.18 K/UL — SIGNIFICANT CHANGE UP (ref 4.8–10.8)
WBC # BLD: 7.27 K/UL — SIGNIFICANT CHANGE UP (ref 4.8–10.8)
WBC # FLD AUTO: 6.18 K/UL — SIGNIFICANT CHANGE UP (ref 4.8–10.8)
WBC # FLD AUTO: 7.27 K/UL — SIGNIFICANT CHANGE UP (ref 4.8–10.8)

## 2022-02-06 PROCEDURE — 93970 EXTREMITY STUDY: CPT | Mod: 26

## 2022-02-06 RX ADMIN — Medication 650 MILLIGRAM(S): at 05:16

## 2022-02-06 RX ADMIN — Medication 15 MILLIGRAM(S): at 03:55

## 2022-02-06 RX ADMIN — Medication 15 MILLIGRAM(S): at 16:28

## 2022-02-06 RX ADMIN — GABAPENTIN 100 MILLIGRAM(S): 400 CAPSULE ORAL at 13:40

## 2022-02-06 RX ADMIN — ENOXAPARIN SODIUM 40 MILLIGRAM(S): 100 INJECTION SUBCUTANEOUS at 11:10

## 2022-02-06 RX ADMIN — Medication 650 MILLIGRAM(S): at 11:09

## 2022-02-06 RX ADMIN — CHLORHEXIDINE GLUCONATE 1 APPLICATION(S): 213 SOLUTION TOPICAL at 05:16

## 2022-02-06 RX ADMIN — Medication 650 MILLIGRAM(S): at 17:40

## 2022-02-06 RX ADMIN — GABAPENTIN 100 MILLIGRAM(S): 400 CAPSULE ORAL at 05:15

## 2022-02-06 RX ADMIN — Medication 50 MICROGRAM(S): at 05:15

## 2022-02-06 RX ADMIN — PANTOPRAZOLE SODIUM 40 MILLIGRAM(S): 20 TABLET, DELAYED RELEASE ORAL at 11:10

## 2022-02-06 RX ADMIN — Medication 650 MILLIGRAM(S): at 17:35

## 2022-02-06 RX ADMIN — GABAPENTIN 100 MILLIGRAM(S): 400 CAPSULE ORAL at 21:14

## 2022-02-06 NOTE — PROGRESS NOTE ADULT - ASSESSMENT
Assessment:  71yF pmh 10/6/2021 pt had emergency colostomy placement with 16 pound weight loss s/p  reversal of colostomy.    Plan:    - Keep provena in place  - Monitor vitals  - Monitor post-op labs and replete as necessary  - Monitor for bowel function  - Continue Pain Medications  - Encourage ambulation as tolerated  - F/U PT  - DVT and GI Prophylaxis  - Monitor wound and dressing for changes, redress as needed.      Spectra: [id number]

## 2022-02-06 NOTE — PROGRESS NOTE ADULT - SUBJECTIVE AND OBJECTIVE BOX
GENERAL SURGERY PROGRESS NOTE    Patient: PRIYANKA DOHERTY , 71y ([birth year])Female   MRN: [id number]  Location: [address]  Visit: 02-04-22 Inpatient  Date: 02-06-22 @ 05:22    Hospital Day #: 3  Post-Op Day #: 2    Procedure/Dx/Injuries: S/P Darrell's reversal    Events of past 24 hours: Passed TOV. NGT discontinued. No acute events.    PAST MEDICAL & SURGICAL HISTORY:  Hypothyroidism    HLD (hyperlipidemia)    S/P hysterectomy    H/O hernia repair  colosstomy        Vitals:   T(F): 97.1 (02-06-22 @ 01:00), Max: 98.4 (02-05-22 @ 12:58)  HR: 72 (02-06-22 @ 01:00)  BP: 99/57 (02-06-22 @ 01:00)  RR: 18 (02-06-22 @ 01:00)  SpO2: 98% (02-06-22 @ 01:00)      Diet, NPO:   Except Medications      Fluids:     I & O's:    02-04-22 @ 07:01  -  02-05-22 @ 07:00  --------------------------------------------------------  IN:    IV PiggyBack: 187.5 mL    Lactated Ringers: 50 mL    Lactated Ringers: 100 mL    Lactated Ringers: 700 mL  Total IN: 1037.5 mL    OUT:    Bulb (mL): 160 mL    Indwelling Catheter - Urethral (mL): 795 mL  Total OUT: 955 mL    Total NET: 82.5 mL        Bowel Movement: : [] YES [x] NO  Flatus: : [] YES [x] NO    General Appearance: NAD  HEENT: EOMI, sclera non-icteric.  Heart: RRR  Lungs: CTABL  Abdomen:  Soft, moderately tender to palpation around incision and YELITZA drain, appropriate for post-op course, Incision/wound: healing well, dressings in place, clean, dry and intact    MEDICATIONS  (STANDING):  acetaminophen     Tablet .. 650 milliGRAM(s) Oral every 6 hours  chlorhexidine 4% Liquid 1 Application(s) Topical <User Schedule>  dextrose 5% + sodium chloride 0.9%. 1000 milliLiter(s) (75 mL/Hr) IV Continuous <Continuous>  enoxaparin Injectable 40 milliGRAM(s) SubCutaneous daily  gabapentin 100 milliGRAM(s) Oral every 8 hours  levothyroxine 50 MICROGram(s) Oral daily  pantoprazole  Injectable 40 milliGRAM(s) IV Push daily    MEDICATIONS  (PRN):  ketorolac   Injectable 15 milliGRAM(s) IV Push every 6 hours PRN Moderate Pain (4 - 6)  ondansetron Injectable 4 milliGRAM(s) IV Push every 6 hours PRN Nausea      DVT PROPHYLAXIS: enoxaparin Injectable 40 milliGRAM(s) SubCutaneous daily    GI PROPHYLAXIS: pantoprazole  Injectable 40 milliGRAM(s) IV Push daily    ANTICOAGULATION:   ANTIBIOTICS:            LAB/STUDIES:  Labs:  CAPILLARY BLOOD GLUCOSE                              9.0    7.27  )-----------( 282      ( 05 Feb 2022 20:00 )             27.4       Auto Neutrophil %: 66.3 % (02-05-22 @ 20:00)  Auto Immature Granulocyte %: 0.3 % (02-05-22 @ 20:00)    02-05    142  |  110  |  7<L>  ----------------------------<  89  4.0   |  18  |  0.5<L>      Calcium, Total Serum: 7.1 mg/dL (02-05-22 @ 20:00)      LFTs:             5.1  | 0.2  | 16       ------------------[47      ( 04 Feb 2022 16:26 )  3.1  | x    | 11          Lipase:x      Amylase:x         Lactate, Blood: 1.4 mmol/L (02-04-22 @ 16:26)      Coags:     11.70  ----< 1.02    ( 04 Feb 2022 16:26 )     33.5        CARDIAC MARKERS ( 05 Feb 2022 04:30 )  x     / <0.01 ng/mL / 585 U/L / x     / 2.3 ng/mL  CARDIAC MARKERS ( 04 Feb 2022 16:26 )  x     / <0.01 ng/mL / x     / x     / x          IMAGING:    < from: Xray Chest 1 View- PORTABLE-Routine (02.05.22 @ 08:45) >  Impression:    Noradiographic evidence of acute cardiopulmonary disease.

## 2022-02-07 LAB
ANION GAP SERPL CALC-SCNC: 8 MMOL/L — SIGNIFICANT CHANGE UP (ref 7–14)
BASOPHILS # BLD AUTO: 0.03 K/UL — SIGNIFICANT CHANGE UP (ref 0–0.2)
BASOPHILS NFR BLD AUTO: 0.4 % — SIGNIFICANT CHANGE UP (ref 0–1)
BUN SERPL-MCNC: 8 MG/DL — LOW (ref 10–20)
CALCIUM SERPL-MCNC: 7.8 MG/DL — LOW (ref 8.5–10.1)
CHLORIDE SERPL-SCNC: 110 MMOL/L — SIGNIFICANT CHANGE UP (ref 98–110)
CO2 SERPL-SCNC: 22 MMOL/L — SIGNIFICANT CHANGE UP (ref 17–32)
CREAT SERPL-MCNC: 0.5 MG/DL — LOW (ref 0.7–1.5)
EOSINOPHIL # BLD AUTO: 0.25 K/UL — SIGNIFICANT CHANGE UP (ref 0–0.7)
EOSINOPHIL NFR BLD AUTO: 3.5 % — SIGNIFICANT CHANGE UP (ref 0–8)
GLUCOSE SERPL-MCNC: 102 MG/DL — HIGH (ref 70–99)
HCT VFR BLD CALC: 27.7 % — LOW (ref 37–47)
HGB BLD-MCNC: 9.1 G/DL — LOW (ref 12–16)
IMM GRANULOCYTES NFR BLD AUTO: 0.1 % — SIGNIFICANT CHANGE UP (ref 0.1–0.3)
LYMPHOCYTES # BLD AUTO: 1.75 K/UL — SIGNIFICANT CHANGE UP (ref 1.2–3.4)
LYMPHOCYTES # BLD AUTO: 24.4 % — SIGNIFICANT CHANGE UP (ref 20.5–51.1)
MAGNESIUM SERPL-MCNC: 2.2 MG/DL — SIGNIFICANT CHANGE UP (ref 1.8–2.4)
MCHC RBC-ENTMCNC: 28.9 PG — SIGNIFICANT CHANGE UP (ref 27–31)
MCHC RBC-ENTMCNC: 32.9 G/DL — SIGNIFICANT CHANGE UP (ref 32–37)
MCV RBC AUTO: 87.9 FL — SIGNIFICANT CHANGE UP (ref 81–99)
MONOCYTES # BLD AUTO: 0.56 K/UL — SIGNIFICANT CHANGE UP (ref 0.1–0.6)
MONOCYTES NFR BLD AUTO: 7.8 % — SIGNIFICANT CHANGE UP (ref 1.7–9.3)
NEUTROPHILS # BLD AUTO: 4.56 K/UL — SIGNIFICANT CHANGE UP (ref 1.4–6.5)
NEUTROPHILS NFR BLD AUTO: 63.8 % — SIGNIFICANT CHANGE UP (ref 42.2–75.2)
NRBC # BLD: 0 /100 WBCS — SIGNIFICANT CHANGE UP (ref 0–0)
PHOSPHATE SERPL-MCNC: 2.2 MG/DL — SIGNIFICANT CHANGE UP (ref 2.1–4.9)
PLATELET # BLD AUTO: 302 K/UL — SIGNIFICANT CHANGE UP (ref 130–400)
POTASSIUM SERPL-MCNC: 4.9 MMOL/L — SIGNIFICANT CHANGE UP (ref 3.5–5)
POTASSIUM SERPL-SCNC: 4.9 MMOL/L — SIGNIFICANT CHANGE UP (ref 3.5–5)
RBC # BLD: 3.15 M/UL — LOW (ref 4.2–5.4)
RBC # FLD: 14.2 % — SIGNIFICANT CHANGE UP (ref 11.5–14.5)
SODIUM SERPL-SCNC: 140 MMOL/L — SIGNIFICANT CHANGE UP (ref 135–146)
WBC # BLD: 7.16 K/UL — SIGNIFICANT CHANGE UP (ref 4.8–10.8)
WBC # FLD AUTO: 7.16 K/UL — SIGNIFICANT CHANGE UP (ref 4.8–10.8)

## 2022-02-07 RX ORDER — POTASSIUM PHOSPHATE, MONOBASIC POTASSIUM PHOSPHATE, DIBASIC 236; 224 MG/ML; MG/ML
30 INJECTION, SOLUTION INTRAVENOUS ONCE
Refills: 0 | Status: COMPLETED | OUTPATIENT
Start: 2022-02-07 | End: 2022-02-07

## 2022-02-07 RX ORDER — ACETAMINOPHEN 500 MG
650 TABLET ORAL EVERY 6 HOURS
Refills: 0 | Status: DISCONTINUED | OUTPATIENT
Start: 2022-02-07 | End: 2022-02-10

## 2022-02-07 RX ADMIN — Medication 15 MILLIGRAM(S): at 11:05

## 2022-02-07 RX ADMIN — GABAPENTIN 100 MILLIGRAM(S): 400 CAPSULE ORAL at 13:28

## 2022-02-07 RX ADMIN — Medication 650 MILLIGRAM(S): at 02:02

## 2022-02-07 RX ADMIN — Medication 650 MILLIGRAM(S): at 09:18

## 2022-02-07 RX ADMIN — GABAPENTIN 100 MILLIGRAM(S): 400 CAPSULE ORAL at 05:35

## 2022-02-07 RX ADMIN — PANTOPRAZOLE SODIUM 40 MILLIGRAM(S): 20 TABLET, DELAYED RELEASE ORAL at 11:04

## 2022-02-07 RX ADMIN — ENOXAPARIN SODIUM 40 MILLIGRAM(S): 100 INJECTION SUBCUTANEOUS at 11:04

## 2022-02-07 RX ADMIN — GABAPENTIN 100 MILLIGRAM(S): 400 CAPSULE ORAL at 21:36

## 2022-02-07 RX ADMIN — CHLORHEXIDINE GLUCONATE 1 APPLICATION(S): 213 SOLUTION TOPICAL at 05:35

## 2022-02-07 RX ADMIN — Medication 650 MILLIGRAM(S): at 08:48

## 2022-02-07 RX ADMIN — Medication 650 MILLIGRAM(S): at 22:34

## 2022-02-07 RX ADMIN — Medication 15 MILLIGRAM(S): at 11:20

## 2022-02-07 RX ADMIN — Medication 15 MILLIGRAM(S): at 21:43

## 2022-02-07 RX ADMIN — Medication 50 MICROGRAM(S): at 05:36

## 2022-02-07 RX ADMIN — POTASSIUM PHOSPHATE, MONOBASIC POTASSIUM PHOSPHATE, DIBASIC 83.33 MILLIMOLE(S): 236; 224 INJECTION, SOLUTION INTRAVENOUS at 05:35

## 2022-02-07 RX ADMIN — Medication 15 MILLIGRAM(S): at 02:04

## 2022-02-07 NOTE — PROGRESS NOTE ADULT - SUBJECTIVE AND OBJECTIVE BOX
GENERAL SURGERY PROGRESS NOTE    Patient: PRIYANKA DOHERTY , 71y ([birth year])Female   MRN: [id number]  Location: [address]  Visit: 02-04-22 Inpatient  Date: 02-07-22 @ 08:56      Procedure/Dx/Injuries: S/P Darrell's reversal    Events of past 24 hours: dc ngt, cld, no bm w blood, +gas and +bm    PAST MEDICAL & SURGICAL HISTORY:  Hypothyroidism    HLD (hyperlipidemia)    S/P hysterectomy    H/O hernia repair  colosstomy        Vitals:   T(F): 97.8 (02-07-22 @ 05:25), Max: 98.6 (02-06-22 @ 16:34)  HR: 67 (02-07-22 @ 05:25)  BP: 99/56 (02-07-22 @ 05:25)  RR: 18 (02-07-22 @ 05:25)  SpO2: 99% (02-07-22 @ 05:25)      Diet, Regular:   Fiber/Residue Restricted      Fluids:     I & O's:    02-06-22 @ 07:01  -  02-07-22 @ 07:00  --------------------------------------------------------  IN:    dextrose 5% + sodium chloride 0.9%: 825 mL  Total IN: 825 mL    OUT:    Bulb (mL): 45 mL    Voided (mL): 900 mL  Total OUT: 945 mL    Total NET: -120 mL          PHYSICAL EXAM:  General Appearance: AAOX3, NAD  Heart: RRR  Lungs: CTAx2  Abdomen: soft, non tender non distended, post surgical wound with staples, no secretions  MSK/Extremities:  mobile, intact ROM      MEDICATIONS  (STANDING):  acetaminophen     Tablet .. 650 milliGRAM(s) Oral every 6 hours  chlorhexidine 4% Liquid 1 Application(s) Topical <User Schedule>  enoxaparin Injectable 40 milliGRAM(s) SubCutaneous daily  gabapentin 100 milliGRAM(s) Oral every 8 hours  levothyroxine 50 MICROGram(s) Oral daily  pantoprazole  Injectable 40 milliGRAM(s) IV Push daily    MEDICATIONS  (PRN):  ketorolac   Injectable 15 milliGRAM(s) IV Push every 6 hours PRN Moderate Pain (4 - 6)  ondansetron Injectable 4 milliGRAM(s) IV Push every 6 hours PRN Nausea      DVT PROPHYLAXIS: enoxaparin Injectable 40 milliGRAM(s) SubCutaneous daily    GI PROPHYLAXIS: pantoprazole  Injectable 40 milliGRAM(s) IV Push daily    ANTICOAGULATION:   ANTIBIOTICS:            LAB/STUDIES:  Labs:  CAPILLARY BLOOD GLUCOSE                              8.2    6.18  )-----------( 280      ( 06 Feb 2022 20:00 )             25.1       Auto Neutrophil %: 64.5 % (02-06-22 @ 20:00)  Auto Immature Granulocyte %: 0.2 % (02-06-22 @ 20:00)    02-06    137  |  108  |  3<L>  ----------------------------<  98  3.6   |  23  |  <0.5<L>      Calcium, Total Serum: 7.1 mg/dL (02-06-22 @ 20:00)      LFTs:     Lactate, Blood: 1.4 mmol/L (02-04-22 @ 16:26)      Coags:                        IMAGING:      ACCESS/ DEVICES:  [ ] Peripheral IV  [ ] Central Venous Line	[ ] R	[ ] L	[ ] IJ	[ ] Fem	[ ] SC	Placed:   [ ] Arterial Line		[ ] R	[ ] L	[ ] Fem	[ ] Rad	[ ] Ax	Placed:   [ ] PICC:					[ ] Mediport  [ ] Urinary Catheter,  Date Placed:   [ ] Chest tube: [ ] Right, [ ] Left  [ ] YELITZA/Solomon Drains   GENERAL SURGERY PROGRESS NOTE    Patient: PRIYANKA DOHERTY , 71y ([birth year])Female   MRN: [id number]  Location: [address]  Visit: 02-04-22 Inpatient  Date: 02-07-22 @ 08:56      Procedure/Dx/Injuries: S/P Darrell's reversal    Events of past 24 hours: dc ngt, cld, +gas and +bm,    PAST MEDICAL & SURGICAL HISTORY:  Hypothyroidism    HLD (hyperlipidemia)    S/P hysterectomy    H/O hernia repair  colosstomy        Vitals:   T(F): 97.8 (02-07-22 @ 05:25), Max: 98.6 (02-06-22 @ 16:34)  HR: 67 (02-07-22 @ 05:25)  BP: 99/56 (02-07-22 @ 05:25)  RR: 18 (02-07-22 @ 05:25)  SpO2: 99% (02-07-22 @ 05:25)      Diet, Regular:   Fiber/Residue Restricted      Fluids:     I & O's:    02-06-22 @ 07:01  -  02-07-22 @ 07:00  --------------------------------------------------------  IN:    dextrose 5% + sodium chloride 0.9%: 825 mL  Total IN: 825 mL    OUT:    Bulb (mL): 45 mL    Voided (mL): 900 mL  Total OUT: 945 mL    Total NET: -120 mL          PHYSICAL EXAM:  General Appearance: AAOX3, NAD  Heart: RRR  Lungs: CTAx2  Abdomen: soft, non tender non distended, post surgical wound with provena vac in place  MSK/Extremities:  mobile, intact ROM      MEDICATIONS  (STANDING):  acetaminophen     Tablet .. 650 milliGRAM(s) Oral every 6 hours  chlorhexidine 4% Liquid 1 Application(s) Topical <User Schedule>  enoxaparin Injectable 40 milliGRAM(s) SubCutaneous daily  gabapentin 100 milliGRAM(s) Oral every 8 hours  levothyroxine 50 MICROGram(s) Oral daily  pantoprazole  Injectable 40 milliGRAM(s) IV Push daily    MEDICATIONS  (PRN):  ketorolac   Injectable 15 milliGRAM(s) IV Push every 6 hours PRN Moderate Pain (4 - 6)  ondansetron Injectable 4 milliGRAM(s) IV Push every 6 hours PRN Nausea      DVT PROPHYLAXIS: enoxaparin Injectable 40 milliGRAM(s) SubCutaneous daily    GI PROPHYLAXIS: pantoprazole  Injectable 40 milliGRAM(s) IV Push daily    ANTICOAGULATION:   ANTIBIOTICS:            LAB/STUDIES:  Labs:  CAPILLARY BLOOD GLUCOSE                              8.2    6.18  )-----------( 280      ( 06 Feb 2022 20:00 )             25.1       Auto Neutrophil %: 64.5 % (02-06-22 @ 20:00)  Auto Immature Granulocyte %: 0.2 % (02-06-22 @ 20:00)    02-06    137  |  108  |  3<L>  ----------------------------<  98  3.6   |  23  |  <0.5<L>      Calcium, Total Serum: 7.1 mg/dL (02-06-22 @ 20:00)      LFTs:     Lactate, Blood: 1.4 mmol/L (02-04-22 @ 16:26)      Coags:                        IMAGING:      ACCESS/ DEVICES:  [ ] Peripheral IV  [ ] Central Venous Line	[ ] R	[ ] L	[ ] IJ	[ ] Fem	[ ] SC	Placed:   [ ] Arterial Line		[ ] R	[ ] L	[ ] Fem	[ ] Rad	[ ] Ax	Placed:   [ ] PICC:					[ ] Mediport  [ ] Urinary Catheter,  Date Placed:   [ ] Chest tube: [ ] Right, [ ] Left  [ ] YELITZA/Solomon Drains

## 2022-02-07 NOTE — PROGRESS NOTE ADULT - ASSESSMENT
Assessment:  71yF pmh 10/6/2021 pt had emergency colostomy placement with 16 pound weight loss s/p  reversal of colostomy.    Plan:    - Keep provena in place  - IVL  - Lower res diet  - Monitor BM  - Monitor H&H  - CBC AM  - f/u b/l LE duplex read  - GI/DVT ppx  - Pain control  - CM x dispo    Spectra: [id number] Assessment:  71yF pmh 10/6/2021 pt had emergency colostomy placement with 16 pound weight loss s/p  reversal of colostomy.    Plan:    - Keep provena in place  - IVL  - Lower res diet  - Monitor BM  - f/u b/l LE duplex read  - GI/DVT ppx  - Pain control  - CM x dispo    Spectra: [id number]

## 2022-02-07 NOTE — PHYSICAL THERAPY INITIAL EVALUATION ADULT - ADDITIONAL COMMENTS
2 KEON, 1 flight to bedroom, 1 flight to basement   pt stated her sister will be staying with her upon d/c
Patient lives alone in house with 3 steps to enter and 1 FOS to bedroom. Was independent in ADL's and ambulation prior to hospitalization

## 2022-02-07 NOTE — PHYSICAL THERAPY INITIAL EVALUATION ADULT - GENERAL OBSERVATIONS, REHAB EVAL
Chart reviewed. attempted PT IE 0920am. However pt would like to participate in PT eval after the ward is discharged. MICKEY Florian made aware.
14:00-14:20 20 min Chart reviewed, attempted to see pt for PT IE, pt rec in bed in NAD, +IV, pt declined PT at this time 2* she just amb with RN on the unit with RW, RN confirmed, pt did not want to amb again 2* c/o feeling "achy" at surgical site, 4/10, pt did not request pain meds, social hx obtained, PT educated pt on supine UE/LE therex, pt verbalized understanding, pt left as found in NAD, will f/u for IE
Patient encountered lying in bed (+) YELITZA drain and wound vac in abdomen. Agreed to participate in therapy.

## 2022-02-08 LAB
ANION GAP SERPL CALC-SCNC: 5 MMOL/L — LOW (ref 7–14)
BASOPHILS # BLD AUTO: 0.02 K/UL — SIGNIFICANT CHANGE UP (ref 0–0.2)
BASOPHILS NFR BLD AUTO: 0.3 % — SIGNIFICANT CHANGE UP (ref 0–1)
BUN SERPL-MCNC: 12 MG/DL — SIGNIFICANT CHANGE UP (ref 10–20)
CALCIUM SERPL-MCNC: 8.5 MG/DL — SIGNIFICANT CHANGE UP (ref 8.5–10.1)
CHLORIDE SERPL-SCNC: 110 MMOL/L — SIGNIFICANT CHANGE UP (ref 98–110)
CO2 SERPL-SCNC: 24 MMOL/L — SIGNIFICANT CHANGE UP (ref 17–32)
CREAT SERPL-MCNC: 0.5 MG/DL — LOW (ref 0.7–1.5)
EOSINOPHIL # BLD AUTO: 0.25 K/UL — SIGNIFICANT CHANGE UP (ref 0–0.7)
EOSINOPHIL NFR BLD AUTO: 3.9 % — SIGNIFICANT CHANGE UP (ref 0–8)
GLUCOSE SERPL-MCNC: 109 MG/DL — HIGH (ref 70–99)
HCT VFR BLD CALC: 27.3 % — LOW (ref 37–47)
HGB BLD-MCNC: 8.9 G/DL — LOW (ref 12–16)
IMM GRANULOCYTES NFR BLD AUTO: 0.2 % — SIGNIFICANT CHANGE UP (ref 0.1–0.3)
LYMPHOCYTES # BLD AUTO: 1.83 K/UL — SIGNIFICANT CHANGE UP (ref 1.2–3.4)
LYMPHOCYTES # BLD AUTO: 28.7 % — SIGNIFICANT CHANGE UP (ref 20.5–51.1)
MAGNESIUM SERPL-MCNC: 1.9 MG/DL — SIGNIFICANT CHANGE UP (ref 1.8–2.4)
MCHC RBC-ENTMCNC: 28.6 PG — SIGNIFICANT CHANGE UP (ref 27–31)
MCHC RBC-ENTMCNC: 32.6 G/DL — SIGNIFICANT CHANGE UP (ref 32–37)
MCV RBC AUTO: 87.8 FL — SIGNIFICANT CHANGE UP (ref 81–99)
MONOCYTES # BLD AUTO: 0.45 K/UL — SIGNIFICANT CHANGE UP (ref 0.1–0.6)
MONOCYTES NFR BLD AUTO: 7.1 % — SIGNIFICANT CHANGE UP (ref 1.7–9.3)
NEUTROPHILS # BLD AUTO: 3.82 K/UL — SIGNIFICANT CHANGE UP (ref 1.4–6.5)
NEUTROPHILS NFR BLD AUTO: 59.8 % — SIGNIFICANT CHANGE UP (ref 42.2–75.2)
NRBC # BLD: 0 /100 WBCS — SIGNIFICANT CHANGE UP (ref 0–0)
PHOSPHATE SERPL-MCNC: 2.7 MG/DL — SIGNIFICANT CHANGE UP (ref 2.1–4.9)
PLATELET # BLD AUTO: 326 K/UL — SIGNIFICANT CHANGE UP (ref 130–400)
POTASSIUM SERPL-MCNC: 5.3 MMOL/L — HIGH (ref 3.5–5)
POTASSIUM SERPL-SCNC: 5.3 MMOL/L — HIGH (ref 3.5–5)
RBC # BLD: 3.11 M/UL — LOW (ref 4.2–5.4)
RBC # FLD: 14.3 % — SIGNIFICANT CHANGE UP (ref 11.5–14.5)
SODIUM SERPL-SCNC: 139 MMOL/L — SIGNIFICANT CHANGE UP (ref 135–146)
WBC # BLD: 6.38 K/UL — SIGNIFICANT CHANGE UP (ref 4.8–10.8)
WBC # FLD AUTO: 6.38 K/UL — SIGNIFICANT CHANGE UP (ref 4.8–10.8)

## 2022-02-08 RX ADMIN — Medication 650 MILLIGRAM(S): at 23:39

## 2022-02-08 RX ADMIN — Medication 650 MILLIGRAM(S): at 16:00

## 2022-02-08 RX ADMIN — Medication 15 MILLIGRAM(S): at 15:20

## 2022-02-08 RX ADMIN — Medication 15 MILLIGRAM(S): at 16:00

## 2022-02-08 RX ADMIN — GABAPENTIN 100 MILLIGRAM(S): 400 CAPSULE ORAL at 13:53

## 2022-02-08 RX ADMIN — Medication 650 MILLIGRAM(S): at 15:20

## 2022-02-08 RX ADMIN — Medication 650 MILLIGRAM(S): at 05:46

## 2022-02-08 RX ADMIN — GABAPENTIN 100 MILLIGRAM(S): 400 CAPSULE ORAL at 21:18

## 2022-02-08 RX ADMIN — PANTOPRAZOLE SODIUM 40 MILLIGRAM(S): 20 TABLET, DELAYED RELEASE ORAL at 11:49

## 2022-02-08 RX ADMIN — Medication 15 MILLIGRAM(S): at 23:43

## 2022-02-08 RX ADMIN — ENOXAPARIN SODIUM 40 MILLIGRAM(S): 100 INJECTION SUBCUTANEOUS at 11:49

## 2022-02-08 RX ADMIN — Medication 15 MILLIGRAM(S): at 05:40

## 2022-02-08 RX ADMIN — Medication 50 MICROGRAM(S): at 05:39

## 2022-02-08 RX ADMIN — GABAPENTIN 100 MILLIGRAM(S): 400 CAPSULE ORAL at 05:39

## 2022-02-08 NOTE — DIETITIAN INITIAL EVALUATION ADULT. - PERTINENT MEDS FT
MEDICATIONS  (STANDING):  chlorhexidine 4% Liquid 1 Application(s) Topical <User Schedule>  enoxaparin Injectable 40 milliGRAM(s) SubCutaneous daily  gabapentin 100 milliGRAM(s) Oral every 8 hours  levothyroxine 50 MICROGram(s) Oral daily  pantoprazole  Injectable 40 milliGRAM(s) IV Push daily    MEDICATIONS  (PRN):  acetaminophen     Tablet .. 650 milliGRAM(s) Oral every 6 hours PRN Mild Pain (1 - 3)  ketorolac   Injectable 15 milliGRAM(s) IV Push every 6 hours PRN Moderate Pain (4 - 6)  ondansetron Injectable 4 milliGRAM(s) IV Push every 6 hours PRN Nausea

## 2022-02-08 NOTE — DIETITIAN NUTRITION RISK NOTIFICATION - ADDITIONAL COMMENTS/DIETITIAN RECOMMENDATIONS
BMI 19.6 based on hospital weight, weight at home  lbs associated w/BMI 17.9-18.5, which is underweight. Pt experienced wt loss of 20 lbs (17%) between May and October of 2021.

## 2022-02-08 NOTE — PROGRESS NOTE ADULT - SUBJECTIVE AND OBJECTIVE BOX
GENERAL SURGERY PROGRESS NOTE    Patient: PRIYANKA DOHERTY , 71y ([birth year])Female   MRN: [id number]  Location: [address]  Visit: 02-04-22 Inpatient  Date: 02-08-22 @ 09:44        Procedure/Dx/Injuries: S/P Darrell's reversal    Events of past 24 hours: low res diet, ambulating, +gas and +bm,    PAST MEDICAL & SURGICAL HISTORY:  Hypothyroidism    HLD (hyperlipidemia)    S/P hysterectomy    H/O hernia repair  colosstomy        Vitals:   T(F): 97 (02-08-22 @ 05:00), Max: 99 (02-07-22 @ 21:25)  HR: 65 (02-08-22 @ 05:00)  BP: 100/55 (02-08-22 @ 05:00)  RR: 18 (02-08-22 @ 05:00)  SpO2: 99% (02-08-22 @ 05:00)      Diet, Regular:   Fiber/Residue Restricted  Supplement Feeding Modality:  Oral  Ensure Enlive Cans or Servings Per Day:  3       Frequency:  Three Times a day      Fluids:     I & O's:    02-07-22 @ 07:01  -  02-08-22 @ 07:00  --------------------------------------------------------  IN:  Total IN: 0 mL    OUT:    Bulb (mL): 15 mL  Total OUT: 15 mL    Total NET: -15 mL          PHYSICAL EXAM:  General Appearance: AAOX3, NAD  Heart: RRR  Lungs: CTAx2  Abdomen:  soft, non tender, non distended post surgical wound with provena vac in place, RUQ drain with SS output  MSK/Extremities:  mobile, intact ROM      MEDICATIONS  (STANDING):  chlorhexidine 4% Liquid 1 Application(s) Topical <User Schedule>  enoxaparin Injectable 40 milliGRAM(s) SubCutaneous daily  gabapentin 100 milliGRAM(s) Oral every 8 hours  levothyroxine 50 MICROGram(s) Oral daily  pantoprazole  Injectable 40 milliGRAM(s) IV Push daily    MEDICATIONS  (PRN):  acetaminophen     Tablet .. 650 milliGRAM(s) Oral every 6 hours PRN Mild Pain (1 - 3)  ketorolac   Injectable 15 milliGRAM(s) IV Push every 6 hours PRN Moderate Pain (4 - 6)  ondansetron Injectable 4 milliGRAM(s) IV Push every 6 hours PRN Nausea      DVT PROPHYLAXIS: enoxaparin Injectable 40 milliGRAM(s) SubCutaneous daily    GI PROPHYLAXIS: pantoprazole  Injectable 40 milliGRAM(s) IV Push daily    ANTICOAGULATION:   ANTIBIOTICS:            LAB/STUDIES:  Labs:  CAPILLARY BLOOD GLUCOSE                              9.1    7.16  )-----------( 302      ( 07 Feb 2022 20:00 )             27.7       Auto Neutrophil %: 63.8 % (02-07-22 @ 20:00)  Auto Immature Granulocyte %: 0.1 % (02-07-22 @ 20:00)    02-07    140  |  110  |  8<L>  ----------------------------<  102<H>  4.9   |  22  |  0.5<L>      Calcium, Total Serum: 7.8 mg/dL (02-07-22 @ 20:00)      LFTs:         Coags:                        IMAGING:  < from: [facility] Duplex Lower Ext Vein Scan, Bilat (02.06.22 @ 18:19) >  Impression:    No evidence of deep venous thrombosis or superficial thrombophlebitis in   the bilateral lower extremities.    ICD-10:M79.89    --- End of Report ---    < end of copied text >      ACCESS/ DEVICES:  [ ] Peripheral IV  [ ] Central Venous Line	[ ] R	[ ] L	[ ] IJ	[ ] Fem	[ ] SC	Placed:   [ ] Arterial Line		[ ] R	[ ] L	[ ] Fem	[ ] Rad	[ ] Ax	Placed:   [ ] PICC:					[ ] Mediport  [ ] Urinary Catheter,  Date Placed:   [ ] Chest tube: [ ] Right, [ ] Left  [ ] YELITZA/Solomon Drains

## 2022-02-08 NOTE — DIETITIAN INITIAL EVALUATION ADULT. - OTHER CALCULATIONS
x SF 1.2-1.5 (PCM)= 3859-6460 kcal/day; 58-73 g pro/day based on 1.2-1.5 g/kg (PCM); 1220 mL fluid based on 25 mL/kg

## 2022-02-08 NOTE — PROGRESS NOTE ADULT - ASSESSMENT
:71yF pmh 10/6/2021 pt had emergency colostomy placement with 16 pound weight loss s/p  reversal of colostomy. Pt evaluated at bedside AAOX3, NAD tolerating diet, ambulating +gas and +bm    Plan:  - Keep provena in place  - Lower res diet  - Monitor BM  - GI/DVT ppx  - Pain control  - gi/dvt ppx    Spectra: [phone]

## 2022-02-08 NOTE — DIETITIAN NUTRITION RISK NOTIFICATION - TREATMENT: THE FOLLOWING DIET HAS BEEN RECOMMENDED
Diet, Regular:   Fiber/Residue Restricted  Supplement Feeding Modality:  Oral  Ensure Enlive Cans or Servings Per Day:  3       Frequency:  Three Times a day (02-07-22 @ 19:38) [Active]

## 2022-02-08 NOTE — DIETITIAN INITIAL EVALUATION ADULT. - OTHER INFO
Nutrition related hx as follows per pt: 5/27/21 hysterectomy, weighed 118 lbs at that time (-120 lbs), pt began experiencing GI symptoms, vomiting, diarrhea, on 10/6 pt underwent emergency colostomy, weighed 98 lbs at that time, wt loss of 20 lbs (17%) x 4 months, since emergency colostomy UBW  lbs, underwent colostomy reversal 2/4. At home pt has been following low fiber diet r/t colostomy, good appetite and intake at home. Pt currently on fiber/residue restricted diet, also receiving Ensure, which she drinks if she can't consume at least 50% of her meals. Pt to remain on GI soft, low fiber, low residue, low fat, high protein diet until outpt follow-up, then to slowly reintroduce fiber to diet, while focusing on adequate protein intake. Per pt, desired body weight 115-120 lbs, encouraged pt to follow-up w/outpt dietitian, in unable to achieve IBW. Pt concerned about experiencing diarrhea, discussed Banatrol Plus as an option.

## 2022-02-08 NOTE — DIETITIAN INITIAL EVALUATION ADULT. - ADD RECOMMEND
1) Continue diet and supplements as ordered (may consider low fat diet modifier, however pt tolerating fat at this time, provided pt w/always available menu to choose foods she will tolerate). 2) Advance diet as tolerated pending surgery f/u 3) If pt unable to maintain weight/wt gain to goal wt 115-120 lbs, f/u outpatient (information provided)

## 2022-02-09 LAB
POTASSIUM SERPL-MCNC: 3.9 MMOL/L — SIGNIFICANT CHANGE UP (ref 3.5–5)
POTASSIUM SERPL-MCNC: 5.4 MMOL/L — HIGH (ref 3.5–5)
POTASSIUM SERPL-SCNC: 3.9 MMOL/L — SIGNIFICANT CHANGE UP (ref 3.5–5)
POTASSIUM SERPL-SCNC: 5.4 MMOL/L — HIGH (ref 3.5–5)

## 2022-02-09 PROCEDURE — 93010 ELECTROCARDIOGRAM REPORT: CPT

## 2022-02-09 RX ORDER — APIXABAN 2.5 MG/1
2.5 TABLET, FILM COATED ORAL EVERY 12 HOURS
Refills: 0 | Status: DISCONTINUED | OUTPATIENT
Start: 2022-02-09 | End: 2022-02-10

## 2022-02-09 RX ADMIN — Medication 650 MILLIGRAM(S): at 18:18

## 2022-02-09 RX ADMIN — GABAPENTIN 100 MILLIGRAM(S): 400 CAPSULE ORAL at 21:51

## 2022-02-09 RX ADMIN — PANTOPRAZOLE SODIUM 40 MILLIGRAM(S): 20 TABLET, DELAYED RELEASE ORAL at 11:12

## 2022-02-09 RX ADMIN — APIXABAN 2.5 MILLIGRAM(S): 2.5 TABLET, FILM COATED ORAL at 17:57

## 2022-02-09 RX ADMIN — GABAPENTIN 100 MILLIGRAM(S): 400 CAPSULE ORAL at 05:42

## 2022-02-09 RX ADMIN — Medication 650 MILLIGRAM(S): at 09:14

## 2022-02-09 RX ADMIN — Medication 50 MICROGRAM(S): at 05:42

## 2022-02-09 RX ADMIN — Medication 650 MILLIGRAM(S): at 18:48

## 2022-02-09 RX ADMIN — GABAPENTIN 100 MILLIGRAM(S): 400 CAPSULE ORAL at 13:26

## 2022-02-09 RX ADMIN — Medication 15 MILLIGRAM(S): at 14:05

## 2022-02-09 RX ADMIN — Medication 15 MILLIGRAM(S): at 21:51

## 2022-02-09 RX ADMIN — Medication 650 MILLIGRAM(S): at 09:44

## 2022-02-09 RX ADMIN — Medication 15 MILLIGRAM(S): at 13:25

## 2022-02-09 RX ADMIN — ENOXAPARIN SODIUM 40 MILLIGRAM(S): 100 INJECTION SUBCUTANEOUS at 11:12

## 2022-02-09 NOTE — PROGRESS NOTE ADULT - ASSESSMENT
ASSESSMENT:  71y F s/p reversal of Gordon's    PLAN:  f/u AM potassium  f/u EKG  wound vac to suction  discharge planning  pain control  incentive spirometry  DVT/GI prophylaxis  SCDs, IS  encourage ambulation    spectra [phone]

## 2022-02-09 NOTE — PROGRESS NOTE ADULT - ATTENDING COMMENTS
above noted discussed  with surgical resident and pt abdomen soft no distension passing flatus no bm yet
above noted discussed case with surgical resident abdomen soft no distension low output noted
above noted discussed case with surgical resident abdomen soft no distension tolerating diet having bm
above noted discussed case with surgical resident and pt abdomen soft no distension tolerating diet and having bm
above noted discussed case with surgical resident abdomen soft no distension passing flatus tolerating po intake labs noted

## 2022-02-09 NOTE — PROGRESS NOTE ADULT - SUBJECTIVE AND OBJECTIVE BOX
GENERAL SURGERY PROGRESS NOTE    Patient: PRIYANKA DOHERTY , 71y ([birth year])Female   MRN: [id number]  Location: [address]  Visit: 02-04-22 Inpatient  Date: 02-09-22 @ 04:32    Hospital Day #:  Post-Op Day #:    Procedure/Dx/Injuries: s/p reversal of Gordon's    Events of past 24 hours: low removed    PAST MEDICAL & SURGICAL HISTORY:  Hypothyroidism    HLD (hyperlipidemia)    S/P hysterectomy    H/O hernia repair  colosstomy        Vitals:   T(F): 97.7 (02-09-22 @ 01:00), Max: 99.1 (02-08-22 @ 17:06)  HR: 68 (02-09-22 @ 01:00)  BP: 99/63 (02-09-22 @ 01:00)  RR: 18 (02-09-22 @ 01:00)  SpO2: 99% (02-09-22 @ 01:00)      Diet, Regular:   Fiber/Residue Restricted  Supplement Feeding Modality:  Oral  Ensure Enlive Cans or Servings Per Day:  3       Frequency:  Three Times a day      Fluids:     I & O's:    02-07-22 @ 07:01  -  02-08-22 @ 07:00  --------------------------------------------------------  IN:  Total IN: 0 mL    OUT:    Bulb (mL): 15 mL  Total OUT: 15 mL    Total NET: -15 mL    Bowel Movement: : [] YES [] NO  Flatus: : [] YES [] NO    PHYSICAL EXAM:  General: NAD, AAOx3,  Cardiac: RRR S1, S2,   Respiratory: CTAB,   Abdomen: Soft, non-distended, non-tender, wound vac in place holding suction    MEDICATIONS  (STANDING):  chlorhexidine 4% Liquid 1 Application(s) Topical <User Schedule>  enoxaparin Injectable 40 milliGRAM(s) SubCutaneous daily  gabapentin 100 milliGRAM(s) Oral every 8 hours  levothyroxine 50 MICROGram(s) Oral daily  pantoprazole  Injectable 40 milliGRAM(s) IV Push daily    MEDICATIONS  (PRN):  acetaminophen     Tablet .. 650 milliGRAM(s) Oral every 6 hours PRN Mild Pain (1 - 3)  ketorolac   Injectable 15 milliGRAM(s) IV Push every 6 hours PRN Moderate Pain (4 - 6)  ondansetron Injectable 4 milliGRAM(s) IV Push every 6 hours PRN Nausea      DVT PROPHYLAXIS: enoxaparin Injectable 40 milliGRAM(s) SubCutaneous daily    GI PROPHYLAXIS: pantoprazole  Injectable 40 milliGRAM(s) IV Push daily    ANTICOAGULATION:   ANTIBIOTICS:            LAB/STUDIES:  Labs:  CAPILLARY BLOOD GLUCOSE                              8.9    6.38  )-----------( 326      ( 08 Feb 2022 20:00 )             27.3       Auto Neutrophil %: 59.8 % (02-08-22 @ 20:00)  Auto Immature Granulocyte %: 0.2 % (02-08-22 @ 20:00)    02-09    x   |  x   |  x   ----------------------------<  x   5.4<H>   |  x   |  x       Calcium, Total Serum: 8.5 mg/dL (02-08-22 @ 20:00)      IMAGING:      ACCESS/ DEVICES:  [ x] Peripheral IV  [ ] Central Venous Line	[ ] R	[ ] L	[ ] IJ	[ ] Fem	[ ] SC	Placed:   [ ] Arterial Line		[ ] R	[ ] L	[ ] Fem	[ ] Rad	[ ] Ax	Placed:   [ ] PICC:					[ ] Mediport  [ ] Urinary Catheter,  Date Placed:   [ ] Chest tube: [ ] Right, [ ] Left  [ ] LOW/Solomon Drains

## 2022-02-10 ENCOUNTER — TRANSCRIPTION ENCOUNTER (OUTPATIENT)
Age: 72
End: 2022-02-10

## 2022-02-10 VITALS
DIASTOLIC BLOOD PRESSURE: 66 MMHG | TEMPERATURE: 98 F | HEART RATE: 78 BPM | OXYGEN SATURATION: 100 % | SYSTOLIC BLOOD PRESSURE: 97 MMHG | RESPIRATION RATE: 16 BRPM

## 2022-02-10 RX ORDER — OXYCODONE AND ACETAMINOPHEN 5; 325 MG/1; MG/1
1 TABLET ORAL
Qty: 16 | Refills: 0
Start: 2022-02-10 | End: 2022-02-13

## 2022-02-10 RX ORDER — APIXABAN 2.5 MG/1
1 TABLET, FILM COATED ORAL
Qty: 60 | Refills: 0
Start: 2022-02-10 | End: 2022-03-11

## 2022-02-10 RX ORDER — APIXABAN 2.5 MG/1
1 TABLET, FILM COATED ORAL
Qty: 30 | Refills: 0
Start: 2022-02-10 | End: 2022-03-11

## 2022-02-10 RX ORDER — ACETAMINOPHEN 500 MG
2 TABLET ORAL
Qty: 0 | Refills: 0 | DISCHARGE
Start: 2022-02-10

## 2022-02-10 RX ORDER — RIVAROXABAN 15 MG-20MG
1 KIT ORAL
Qty: 30 | Refills: 0
Start: 2022-02-10 | End: 2022-03-11

## 2022-02-10 RX ADMIN — GABAPENTIN 100 MILLIGRAM(S): 400 CAPSULE ORAL at 13:04

## 2022-02-10 RX ADMIN — Medication 50 MICROGRAM(S): at 05:34

## 2022-02-10 RX ADMIN — PANTOPRAZOLE SODIUM 40 MILLIGRAM(S): 20 TABLET, DELAYED RELEASE ORAL at 11:11

## 2022-02-10 RX ADMIN — Medication 15 MILLIGRAM(S): at 08:31

## 2022-02-10 RX ADMIN — GABAPENTIN 100 MILLIGRAM(S): 400 CAPSULE ORAL at 05:34

## 2022-02-10 RX ADMIN — Medication 15 MILLIGRAM(S): at 15:37

## 2022-02-10 RX ADMIN — Medication 15 MILLIGRAM(S): at 08:44

## 2022-02-10 RX ADMIN — APIXABAN 2.5 MILLIGRAM(S): 2.5 TABLET, FILM COATED ORAL at 05:34

## 2022-02-10 NOTE — DISCHARGE NOTE PROVIDER - NSDCFUADDINST_GEN_ALL_CORE_FT
Ambulate as tolerated. Regular diet. No baths or submerge in water for at least 2 weeks. To take eliquis x 30days 2.5mg PO, sent to pharmacy. Follow up with Dr Juarez on scheduled date.

## 2022-02-10 NOTE — DISCHARGE NOTE PROVIDER - HOSPITAL COURSE
70 y/o female pt with PMH of HLD, Hypothyroidism, s/p MARQUEZ and s/p ex lap with colectomy, end colostomy and appendectomy due to stercoral ulcer that comes in for reversal of Darrell. Patient underwent ex lap, TIFFANIE, Colectomy, left, partial, Closure, colostomy, with resection and colorectal anastomosis; patient tolerated procedure well and was admitted to the floor. Post op diet was progressed, patient tolerated well currently on regular diet. Evaluated by Physiatry: home outpatient or VNS and Physical therapy: home with home PT. Patient with +gas and +bm, YELITZA drain with low output was removed and provena vac on wound was removed today. Patient is cleared for DC home today, sister is coming in frow out of town to take care of her.

## 2022-02-10 NOTE — DISCHARGE NOTE NURSING/CASE MANAGEMENT/SOCIAL WORK - NSDCPEFALRISK_GEN_ALL_CORE
For information on Fall & Injury Prevention, visit: [email or web address] OR  [email or web address] OR  https://www.cdc.gov/steadi/patient.html

## 2022-02-10 NOTE — DISCHARGE NOTE PROVIDER - NSDCMRMEDTOKEN_GEN_ALL_CORE_FT
acetaminophen 325 mg oral tablet: 2 tab(s) orally every 6 hours, As needed, Mild Pain (1 - 3)  aspirin 81 mg oral delayed release tablet: 1 tab(s) orally once a day  Colace 100 mg oral capsule: 2 cap(s) orally once a day  Eliquis 2.5 mg oral tablet: 1 tab(s) orally 2 times a day   levothyroxine 50 mcg (0.05 mg) oral capsule: 1 cap(s) orally once a day  multivitamin:   Xyzal 5 mg oral tablet: 1 tab(s) orally once a day (in the evening)   acetaminophen 325 mg oral tablet: 2 tab(s) orally every 6 hours, As needed, Mild Pain (1 - 3)  aspirin 81 mg oral delayed release tablet: 1 tab(s) orally once a day  Colace 100 mg oral capsule: 2 cap(s) orally once a day  levothyroxine 50 mcg (0.05 mg) oral capsule: 1 cap(s) orally once a day  multivitamin:   Xyzal 5 mg oral tablet: 1 tab(s) orally once a day (in the evening)   acetaminophen 325 mg oral tablet: 2 tab(s) orally every 6 hours, As needed, Mild Pain (1 - 3)  apixaban 2.5 mg oral tablet: 1 tab(s) orally 2 times a day  aspirin 81 mg oral delayed release tablet: 1 tab(s) orally once a day  Colace 100 mg oral capsule: 2 cap(s) orally once a day  levothyroxine 50 mcg (0.05 mg) oral capsule: 1 cap(s) orally once a day  multivitamin:   Xyzal 5 mg oral tablet: 1 tab(s) orally once a day (in the evening)

## 2022-02-10 NOTE — DISCHARGE NOTE PROVIDER - NSDCCPCAREPLAN_GEN_ALL_CORE_FT
PRINCIPAL DISCHARGE DIAGNOSIS  Diagnosis: S/P colostomy takedown  Assessment and Plan of Treatment: -Diet: Continue low fiber diet as tolerated. Please avoid roughage like beef for now and instead stick to softer foods including chicken, fish, and pastas.  -Activity: Avoid heavy lifting or straining (anything over 10-15 pounds) for at least 6 weeks. You may ambulate and otherwise resume normal daily activities as tolerated. Your abdominal binder may help with some discomfort while ambulating. Please take home your incentive spirometer and continue to use 10x/hour while awake.  -Incisions: You may shower and allow soap and water to rinse over incisions. Please avoid scrubbing the areas and do not submerge your incisions in water for at least 2 weeks.  -Medications: You may resume your home medications. You may take Tylenol 650mg every 6 hours and alternate with Ibuprofen 600mg every 8 hours for pain.   -Follow-up: Please call the office to schedule a follow-up appointment with Dr. Juarez as previously scheduled.  -Please call the office or return to the ED with persistent fever greater than 100.4F, chest pain, shortness of breath, uncontrollable nausea/vomiting/abdominal pain, constipation, bloody bowel movements, abdominal distention, inability to tolerate oral intake.

## 2022-02-10 NOTE — DISCHARGE NOTE NURSING/CASE MANAGEMENT/SOCIAL WORK - PATIENT PORTAL LINK FT
You can access the FollowMyHealth Patient Portal offered by [facility] by registering at the following website: [email or web address]. By joining [employer]’s FollowMyHealth portal, you will also be able to view your health information using other applications (apps) compatible with our system.

## 2022-02-10 NOTE — DISCHARGE NOTE NURSING/CASE MANAGEMENT/SOCIAL WORK - NSDCVIVACCINE_GEN_ALL_CORE_FT
No Vaccines Administered.
Body Location Override (Optional - Billing Will Still Be Based On Selected Body Map Location If Applicable): right mid medial shin
Detail Level: Simple
Depth Of Biopsy: dermis
Was A Bandage Applied: Yes
Size Of Lesion In Cm: 1
X Size Of Lesion In Cm: 0.6
Biopsy Type: H and E
Biopsy Method: double edge Personna blade
Anesthesia Type: 1% lidocaine with epinephrine
Anesthesia Volume In Cc (Will Not Render If 0): 0.5
Additional Anesthesia Volume In Cc (Will Not Render If 0): 0
Hemostasis: Drysol
Wound Care: Petrolatum
Dressing: bandage
Destruction After The Procedure: No
Type Of Destruction Used: Curettage
Curettage Text: The wound bed was treated with curettage after the biopsy was performed.
Cryotherapy Text: The wound bed was treated with cryotherapy after the biopsy was performed.
Electrodesiccation Text: The wound bed was treated with electrodesiccation after the biopsy was performed.
Electrodesiccation And Curettage Text: The wound bed was treated with electrodesiccation and curettage after the biopsy was performed.
Silver Nitrate Text: The wound bed was treated with silver nitrate after the biopsy was performed.
Lab: [facility] [place] 2[place]0 [facility]
Lab Facility: [address]
Consent: Written consent was obtained and risks were reviewed including but not limited to scarring, infection, bleeding, scabbing, incomplete removal, nerve damage and allergy to anesthesia.
Post-Care Instructions: I reviewed with the patient in detail post-care instructions. Patient is to keep the biopsy site dry overnight, and then apply bacitracin twice daily until healed. Patient may apply hydrogen peroxide soaks to remove any crusting.
Notification Instructions: Patient will be notified of biopsy results. However, patient instructed to call the office if not contacted within 2 weeks.
Billing Type: [employer]
Information: Selecting Yes will display possible errors in your note based on the variables you have selected. This validation is only offered as a suggestion for you. PLEASE NOTE THAT THE VALIDATION TEXT WILL BE REMOVED WHEN YOU FINALIZE YOUR NOTE. IF YOU WANT TO FAX A PRELIMINARY NOTE YOU WILL NEED TO TOGGLE THIS TO 'NO' IF YOU DO NOT WANT IT IN YOUR FAXED NOTE.

## 2022-02-10 NOTE — DISCHARGE NOTE PROVIDER - CARE PROVIDER_API CALL
Tj Juarez)  Surgery  [address] [place] [address]  Phone: [phone]  Fax: [phone]  Follow Up Time:

## 2022-02-10 NOTE — DISCHARGE NOTE PROVIDER - NSDCCPTREATMENT_GEN_ALL_CORE_FT
PRINCIPAL PROCEDURE  Procedure: Closure, colostomy, with resection and colorectal anastomosis  Findings and Treatment: End descending colostomy resected with descending colon through left paramedian incision  Transverse colon to rectal anastomosis with EEA 28mm  Negative leak test

## 2022-02-10 NOTE — DISCHARGE NOTE PROVIDER - DETAILS OF MALNUTRITION DIAGNOSIS/DIAGNOSES
This patient has been assessed with a concern for Malnutrition and was treated during this hospitalization for the following Nutrition diagnosis/diagnoses:     -  02/08/2022: Severe protein-calorie malnutrition

## 2022-02-10 NOTE — PROGRESS NOTE ADULT - SUBJECTIVE AND OBJECTIVE BOX
GENERAL SURGERY PROGRESS NOTE    Patient: PRIYANKA DOHERTY , 71y ([birth year])Female   MRN: [id number]  Location: [address]  Visit: 02-04-22 Inpatient  Date: 02-10-22 @ 09:47      Procedure/Dx/Injuries: laparotomy, exploratory, Lysis of peritoneal adhesions, Colectomy, left, partial,Closure, colostomy, with resection and colorectal anastomosis,     Events of past 24 hours: ambulating, +gas and bm, provena vac removed    PAST MEDICAL & SURGICAL HISTORY:  Hypothyroidism    HLD (hyperlipidemia)    S/P hysterectomy    H/O hernia repair  colosstomy        Vitals:   T(F): 97 (02-10-22 @ 04:51), Max: 98.3 (02-10-22 @ 00:35)  HR: 72 (02-10-22 @ 04:51)  BP: 105/66 (02-10-22 @ 04:51)  RR: 18 (02-10-22 @ 04:51)  SpO2: 99% (02-10-22 @ 04:51)      Diet, Regular:   Fiber/Residue Restricted  Supplement Feeding Modality:  Oral  Ensure Enlive Cans or Servings Per Day:  3       Frequency:  Three Times a day      Fluids:     I & O's:    02-09-22 @ 07:01  -  02-10-22 @ 07:00  --------------------------------------------------------  IN:  Total IN: 0 mL    OUT:    Voided (mL): 450 mL  Total OUT: 450 mL    Total NET: -450 mL          PHYSICAL EXAM:  General Appearance: AAOX3, NAD  Heart: RRR  Lungs: CTAx2  Abdomen:  soft, non tender, non distended, post surgical wound clean with staples in palce  MSK/Extremities:  mobile, intact ROM      MEDICATIONS  (STANDING):  apixaban 2.5 milliGRAM(s) Oral every 12 hours  chlorhexidine 4% Liquid 1 Application(s) Topical <User Schedule>  gabapentin 100 milliGRAM(s) Oral every 8 hours  levothyroxine 50 MICROGram(s) Oral daily  pantoprazole  Injectable 40 milliGRAM(s) IV Push daily    MEDICATIONS  (PRN):  acetaminophen     Tablet .. 650 milliGRAM(s) Oral every 6 hours PRN Mild Pain (1 - 3)  ketorolac   Injectable 15 milliGRAM(s) IV Push every 6 hours PRN Moderate Pain (4 - 6)  ondansetron Injectable 4 milliGRAM(s) IV Push every 6 hours PRN Nausea      DVT PROPHYLAXIS:   GI PROPHYLAXIS: pantoprazole  Injectable 40 milliGRAM(s) IV Push daily    ANTICOAGULATION:   ANTIBIOTICS:            LAB/STUDIES:  Labs:  CAPILLARY BLOOD GLUCOSE                              8.9    6.38  )-----------( 326      ( 08 Feb 2022 20:00 )             27.3         02-09    x   |  x   |  x   ----------------------------<  x   3.9   |  x   |  x           LFTs:         Coags:                        IMAGING:      ACCESS/ DEVICES:  [ ] Peripheral IV  [ ] Central Venous Line	[ ] R	[ ] L	[ ] IJ	[ ] Fem	[ ] SC	Placed:   [ ] Arterial Line		[ ] R	[ ] L	[ ] Fem	[ ] Rad	[ ] Ax	Placed:   [ ] PICC:					[ ] Mediport  [ ] Urinary Catheter,  Date Placed:   [ ] Chest tube: [ ] Right, [ ] Left  [ ] YELITZA/Solomon Drains

## 2022-02-10 NOTE — PROGRESS NOTE ADULT - ASSESSMENT
72 y/o female pt with PMH of HLD, Hypothyroidism, s/p MARQUEZ and s/p ex lap with colectomy, end colostomy and appendectomy due to stercoral ulcer that comes in for reversal of herman. Patient is evaluated at bedside AAOX3, NAD tolerating diet, ambulating +gas and +bm.     PLAN:  -regular diet  -gi/dvt ppx  -pain control  -DC home today    spectra [phone]

## 2022-02-22 LAB — SURGICAL PATHOLOGY STUDY: SIGNIFICANT CHANGE UP

## 2022-02-25 DIAGNOSIS — Z43.3 ENCOUNTER FOR ATTENTION TO COLOSTOMY: ICD-10-CM

## 2022-02-25 DIAGNOSIS — E43 UNSPECIFIED SEVERE PROTEIN-CALORIE MALNUTRITION: ICD-10-CM

## 2022-02-25 DIAGNOSIS — K66.0 PERITONEAL ADHESIONS (POSTPROCEDURAL) (POSTINFECTION): ICD-10-CM

## 2022-02-25 DIAGNOSIS — Z88.8 ALLERGY STATUS TO OTHER DRUGS, MEDICAMENTS AND BIOLOGICAL SUBSTANCES: ICD-10-CM

## 2022-02-25 DIAGNOSIS — E03.9 HYPOTHYROIDISM, UNSPECIFIED: ICD-10-CM

## 2022-02-25 DIAGNOSIS — E78.5 HYPERLIPIDEMIA, UNSPECIFIED: ICD-10-CM

## 2022-07-14 ENCOUNTER — OUTPATIENT (OUTPATIENT)
Dept: OUTPATIENT SERVICES | Facility: HOSPITAL | Age: 72
LOS: 1 days | Discharge: HOME | End: 2022-07-14

## 2022-07-14 VITALS
HEIGHT: 62 IN | WEIGHT: 117.95 LBS | SYSTOLIC BLOOD PRESSURE: 132 MMHG | RESPIRATION RATE: 18 BRPM | HEART RATE: 77 BPM | TEMPERATURE: 98 F | OXYGEN SATURATION: 100 % | DIASTOLIC BLOOD PRESSURE: 90 MMHG

## 2022-07-14 DIAGNOSIS — Z90.710 ACQUIRED ABSENCE OF BOTH CERVIX AND UTERUS: Chronic | ICD-10-CM

## 2022-07-14 DIAGNOSIS — Z98.890 OTHER SPECIFIED POSTPROCEDURAL STATES: Chronic | ICD-10-CM

## 2022-07-14 DIAGNOSIS — K43.2 INCISIONAL HERNIA WITHOUT OBSTRUCTION OR GANGRENE: ICD-10-CM

## 2022-07-14 DIAGNOSIS — Z01.818 ENCOUNTER FOR OTHER PREPROCEDURAL EXAMINATION: ICD-10-CM

## 2022-07-14 LAB
ALBUMIN SERPL ELPH-MCNC: 4.7 G/DL — SIGNIFICANT CHANGE UP (ref 3.5–5.2)
ALP SERPL-CCNC: 51 U/L — SIGNIFICANT CHANGE UP (ref 30–115)
ALT FLD-CCNC: 19 U/L — SIGNIFICANT CHANGE UP (ref 0–41)
ANION GAP SERPL CALC-SCNC: 12 MMOL/L — SIGNIFICANT CHANGE UP (ref 7–14)
APPEARANCE UR: CLEAR — SIGNIFICANT CHANGE UP
APTT BLD: 31.8 SEC — SIGNIFICANT CHANGE UP (ref 27–39.2)
AST SERPL-CCNC: 17 U/L — SIGNIFICANT CHANGE UP (ref 0–41)
BASOPHILS # BLD AUTO: 0.03 K/UL — SIGNIFICANT CHANGE UP (ref 0–0.2)
BASOPHILS NFR BLD AUTO: 0.6 % — SIGNIFICANT CHANGE UP (ref 0–1)
BILIRUB SERPL-MCNC: 0.4 MG/DL — SIGNIFICANT CHANGE UP (ref 0.2–1.2)
BILIRUB UR-MCNC: NEGATIVE — SIGNIFICANT CHANGE UP
BUN SERPL-MCNC: 15 MG/DL — SIGNIFICANT CHANGE UP (ref 10–20)
CALCIUM SERPL-MCNC: 10 MG/DL — SIGNIFICANT CHANGE UP (ref 8.5–10.1)
CHLORIDE SERPL-SCNC: 105 MMOL/L — SIGNIFICANT CHANGE UP (ref 98–110)
CO2 SERPL-SCNC: 27 MMOL/L — SIGNIFICANT CHANGE UP (ref 17–32)
COLOR SPEC: SIGNIFICANT CHANGE UP
CREAT SERPL-MCNC: 0.7 MG/DL — SIGNIFICANT CHANGE UP (ref 0.7–1.5)
DIFF PNL FLD: NEGATIVE — SIGNIFICANT CHANGE UP
EGFR: 92 ML/MIN/1.73M2 — SIGNIFICANT CHANGE UP
EOSINOPHIL # BLD AUTO: 0.17 K/UL — SIGNIFICANT CHANGE UP (ref 0–0.7)
EOSINOPHIL NFR BLD AUTO: 3.2 % — SIGNIFICANT CHANGE UP (ref 0–8)
GLUCOSE SERPL-MCNC: 83 MG/DL — SIGNIFICANT CHANGE UP (ref 70–99)
GLUCOSE UR QL: NEGATIVE — SIGNIFICANT CHANGE UP
HCT VFR BLD CALC: 37.3 % — SIGNIFICANT CHANGE UP (ref 37–47)
HGB BLD-MCNC: 12.2 G/DL — SIGNIFICANT CHANGE UP (ref 12–16)
IMM GRANULOCYTES NFR BLD AUTO: 0.2 % — SIGNIFICANT CHANGE UP (ref 0.1–0.3)
INR BLD: 0.96 RATIO — SIGNIFICANT CHANGE UP (ref 0.65–1.3)
KETONES UR-MCNC: NEGATIVE — SIGNIFICANT CHANGE UP
LEUKOCYTE ESTERASE UR-ACNC: NEGATIVE — SIGNIFICANT CHANGE UP
LYMPHOCYTES # BLD AUTO: 1.81 K/UL — SIGNIFICANT CHANGE UP (ref 1.2–3.4)
LYMPHOCYTES # BLD AUTO: 34.5 % — SIGNIFICANT CHANGE UP (ref 20.5–51.1)
MCHC RBC-ENTMCNC: 29.9 PG — SIGNIFICANT CHANGE UP (ref 27–31)
MCHC RBC-ENTMCNC: 32.7 G/DL — SIGNIFICANT CHANGE UP (ref 32–37)
MCV RBC AUTO: 91.4 FL — SIGNIFICANT CHANGE UP (ref 81–99)
MONOCYTES # BLD AUTO: 0.36 K/UL — SIGNIFICANT CHANGE UP (ref 0.1–0.6)
MONOCYTES NFR BLD AUTO: 6.9 % — SIGNIFICANT CHANGE UP (ref 1.7–9.3)
NEUTROPHILS # BLD AUTO: 2.87 K/UL — SIGNIFICANT CHANGE UP (ref 1.4–6.5)
NEUTROPHILS NFR BLD AUTO: 54.6 % — SIGNIFICANT CHANGE UP (ref 42.2–75.2)
NITRITE UR-MCNC: NEGATIVE — SIGNIFICANT CHANGE UP
NRBC # BLD: 0 /100 WBCS — SIGNIFICANT CHANGE UP (ref 0–0)
PH UR: 6.5 — SIGNIFICANT CHANGE UP (ref 5–8)
PLATELET # BLD AUTO: 269 K/UL — SIGNIFICANT CHANGE UP (ref 130–400)
POTASSIUM SERPL-MCNC: 5.4 MMOL/L — HIGH (ref 3.5–5)
POTASSIUM SERPL-SCNC: 5.4 MMOL/L — HIGH (ref 3.5–5)
PROT SERPL-MCNC: 7.2 G/DL — SIGNIFICANT CHANGE UP (ref 6–8)
PROT UR-MCNC: NEGATIVE — SIGNIFICANT CHANGE UP
PROTHROM AB SERPL-ACNC: 11 SEC — SIGNIFICANT CHANGE UP (ref 9.95–12.87)
RBC # BLD: 4.08 M/UL — LOW (ref 4.2–5.4)
RBC # FLD: 14.7 % — HIGH (ref 11.5–14.5)
SODIUM SERPL-SCNC: 144 MMOL/L — SIGNIFICANT CHANGE UP (ref 135–146)
SP GR SPEC: 1.01 — SIGNIFICANT CHANGE UP (ref 1.01–1.03)
UROBILINOGEN FLD QL: SIGNIFICANT CHANGE UP
WBC # BLD: 5.25 K/UL — SIGNIFICANT CHANGE UP (ref 4.8–10.8)
WBC # FLD AUTO: 5.25 K/UL — SIGNIFICANT CHANGE UP (ref 4.8–10.8)

## 2022-07-14 RX ORDER — DOCUSATE SODIUM 100 MG
2 CAPSULE ORAL
Qty: 0 | Refills: 0 | DISCHARGE

## 2022-07-14 NOTE — H&P PST ADULT - ATTENDING COMMENTS
I met with patient  reviewed consent  r/b/a explained to patient again  all questions answered  patient aware of risks including, but not exclusive of, infection, bleeding, neurovascular damage, numbness, keloid scar, dvt, pe pt aware of risk of mesh

## 2022-07-14 NOTE — H&P PST ADULT - REASON FOR ADMISSION
Patient is a 72  year old  female presenting to PAST in preparation for REPAIR OF INCISIONAL HERNIA WITH MESH  on 7/22/22   under lsb  anesthesia by Dr. sherman rodriguez .

## 2022-07-14 NOTE — H&P PST ADULT - HISTORY OF PRESENT ILLNESS
pt with reversal colostomy early this yr   now for planned procedure     PATIENT CURRENTLY DENIES CHEST PAIN  SHORTNESS OF BREATH  PALPITATIONS,  DYSURIA, OR UPPER RESPIRATORY INFECTION IN PAST 2 WEEKS  EXERCISE  TOLERANCE  1-2 FLIGHT OF STAIRS  WITHOUT SHORTNESS OF BREATH  denies travel outside the [place] in the past 30 days  Patient denies any signs or symptoms of COVID 19 and denies contact with known positive individuals.  They have an appointment for repeat COVID testing pre-procedure and acknowledge its time and place.  They were instructed to quarantine pre-procedure, practice exposure control measures, continue to self-monitor and report any concerns to their proceduralist.  pt advised self quarantine till day of procedure  Anesthesia Alert  NO--Difficult Airway  NO--History of neck surgery or radiation  NO--Limited ROM of neck  NO--History of Malignant hyperthermia  NO--No personal or family history of Pseudocholinesterase deficiency.  NO--Prior Anesthesia Complication  NO--Latex Allergy  NO--Loose teeth  NO--History of Rheumatoid Arthritis  NO--Bleeding risk  NO--FLORENCIA  NO--Other_____    PT DENIES ANY RASHES, ABRASION, OR OPEN WOUNDS OR CUTS    AS PER THE PT, THIS IS HIS/HER COMPLETE MEDICAL AND SURGICAL HX, INCLUDING MEDICATIONS PRESCRIBED AND OVER THE COUNTER    Patient verbalized understanding of instructions and was given the opportunity to ask questions and have them answered.    pt denies any suicidal ideation or thoughts, pt states not a threat to self or others    K43.2/79839    Incisional hernia, without obstruction or gangrene    Encounter for other preprocedural examination    ^K43.2/86847    Incisional hernia, without obstruction or gangrene    Encounter for other preprocedural examination    Hypothyroidism    HLD (hyperlipidemia)    No significant past surgical history    S/P hysterectomy    H/O hernia repair

## 2022-07-21 NOTE — ASU PATIENT PROFILE, ADULT - FALL HARM RISK - RISK INTERVENTIONS

## 2022-07-21 NOTE — ASU PATIENT PROFILE, ADULT - NSICDXPASTSURGICALHX_GEN_ALL_CORE_FT
PAST SURGICAL HISTORY:  H/O hernia repair colosstomy    S/P hysterectomy      PAST SURGICAL HISTORY:  Ankle fracture, right pins and plates placed 2011    H/O hernia repair colostomy    History of colostomy reversal 2/2022    S/P hysterectomy

## 2022-07-21 NOTE — ASU PATIENT PROFILE, ADULT - NSICDXPASTMEDICALHX_GEN_ALL_CORE_FT
PAST MEDICAL HISTORY:  HLD (hyperlipidemia)     Hypothyroidism     Seasonal allergies      PAST MEDICAL HISTORY:  HLD (hyperlipidemia)     Hypothyroidism     Mitral valve prolapse     Seasonal allergies

## 2022-07-22 ENCOUNTER — TRANSCRIPTION ENCOUNTER (OUTPATIENT)
Age: 72
End: 2022-07-22

## 2022-07-22 ENCOUNTER — INPATIENT (INPATIENT)
Facility: HOSPITAL | Age: 72
LOS: 2 days | Discharge: HOME HEALTH | End: 2022-07-25
Attending: SURGERY | Admitting: SURGERY

## 2022-07-22 VITALS
HEIGHT: 62 IN | HEART RATE: 69 BPM | TEMPERATURE: 99 F | WEIGHT: 117.95 LBS | RESPIRATION RATE: 17 BRPM | DIASTOLIC BLOOD PRESSURE: 71 MMHG | SYSTOLIC BLOOD PRESSURE: 104 MMHG | OXYGEN SATURATION: 100 %

## 2022-07-22 DIAGNOSIS — E78.5 HYPERLIPIDEMIA, UNSPECIFIED: ICD-10-CM

## 2022-07-22 DIAGNOSIS — Z90.710 ACQUIRED ABSENCE OF BOTH CERVIX AND UTERUS: Chronic | ICD-10-CM

## 2022-07-22 DIAGNOSIS — I34.1 NONRHEUMATIC MITRAL (VALVE) PROLAPSE: ICD-10-CM

## 2022-07-22 DIAGNOSIS — K43.2 INCISIONAL HERNIA WITHOUT OBSTRUCTION OR GANGRENE: ICD-10-CM

## 2022-07-22 DIAGNOSIS — S82.891A OTHER FRACTURE OF RIGHT LOWER LEG, INITIAL ENCOUNTER FOR CLOSED FRACTURE: Chronic | ICD-10-CM

## 2022-07-22 DIAGNOSIS — E83.42 HYPOMAGNESEMIA: ICD-10-CM

## 2022-07-22 DIAGNOSIS — J30.2 OTHER SEASONAL ALLERGIC RHINITIS: ICD-10-CM

## 2022-07-22 DIAGNOSIS — Z98.890 OTHER SPECIFIED POSTPROCEDURAL STATES: Chronic | ICD-10-CM

## 2022-07-22 DIAGNOSIS — K66.0 PERITONEAL ADHESIONS (POSTPROCEDURAL) (POSTINFECTION): ICD-10-CM

## 2022-07-22 DIAGNOSIS — E03.9 HYPOTHYROIDISM, UNSPECIFIED: ICD-10-CM

## 2022-07-22 RX ORDER — ACETAMINOPHEN 500 MG
1000 TABLET ORAL ONCE
Refills: 0 | Status: COMPLETED | OUTPATIENT
Start: 2022-07-22 | End: 2022-07-22

## 2022-07-22 RX ORDER — LEVOTHYROXINE SODIUM 125 MCG
25 TABLET ORAL AT BEDTIME
Refills: 0 | Status: DISCONTINUED | OUTPATIENT
Start: 2022-07-22 | End: 2022-07-23

## 2022-07-22 RX ORDER — EZETIMIBE 10 MG/1
1 TABLET ORAL
Qty: 0 | Refills: 0 | DISCHARGE

## 2022-07-22 RX ORDER — KETOROLAC TROMETHAMINE 30 MG/ML
15 SYRINGE (ML) INJECTION EVERY 6 HOURS
Refills: 0 | Status: DISCONTINUED | OUTPATIENT
Start: 2022-07-22 | End: 2022-07-23

## 2022-07-22 RX ORDER — DOCUSATE SODIUM 100 MG
1 CAPSULE ORAL
Qty: 0 | Refills: 0 | DISCHARGE

## 2022-07-22 RX ORDER — PANTOPRAZOLE SODIUM 20 MG/1
40 TABLET, DELAYED RELEASE ORAL DAILY
Refills: 0 | Status: DISCONTINUED | OUTPATIENT
Start: 2022-07-22 | End: 2022-07-25

## 2022-07-22 RX ORDER — OXYCODONE AND ACETAMINOPHEN 5; 325 MG/1; MG/1
1 TABLET ORAL ONCE
Refills: 0 | Status: DISCONTINUED | OUTPATIENT
Start: 2022-07-22 | End: 2022-07-22

## 2022-07-22 RX ORDER — HYDROMORPHONE HYDROCHLORIDE 2 MG/ML
0.5 INJECTION INTRAMUSCULAR; INTRAVENOUS; SUBCUTANEOUS
Refills: 0 | Status: DISCONTINUED | OUTPATIENT
Start: 2022-07-22 | End: 2022-07-22

## 2022-07-22 RX ORDER — SODIUM CHLORIDE 9 MG/ML
1000 INJECTION, SOLUTION INTRAVENOUS
Refills: 0 | Status: DISCONTINUED | OUTPATIENT
Start: 2022-07-22 | End: 2022-07-22

## 2022-07-22 RX ORDER — MORPHINE SULFATE 50 MG/1
2 CAPSULE, EXTENDED RELEASE ORAL
Refills: 0 | Status: DISCONTINUED | OUTPATIENT
Start: 2022-07-22 | End: 2022-07-22

## 2022-07-22 RX ORDER — ONDANSETRON 8 MG/1
4 TABLET, FILM COATED ORAL ONCE
Refills: 0 | Status: COMPLETED | OUTPATIENT
Start: 2022-07-22 | End: 2022-07-22

## 2022-07-22 RX ORDER — SODIUM CHLORIDE 9 MG/ML
1000 INJECTION, SOLUTION INTRAVENOUS
Refills: 0 | Status: DISCONTINUED | OUTPATIENT
Start: 2022-07-22 | End: 2022-07-24

## 2022-07-22 RX ORDER — LEVOCETIRIZINE DIHYDROCHLORIDE 0.5 MG/ML
1 SOLUTION ORAL
Qty: 0 | Refills: 0 | DISCHARGE

## 2022-07-22 RX ORDER — HYDROMORPHONE HYDROCHLORIDE 2 MG/ML
0.5 INJECTION INTRAMUSCULAR; INTRAVENOUS; SUBCUTANEOUS EVERY 4 HOURS
Refills: 0 | Status: DISCONTINUED | OUTPATIENT
Start: 2022-07-22 | End: 2022-07-25

## 2022-07-22 RX ORDER — LEVOTHYROXINE SODIUM 125 MCG
1 TABLET ORAL
Qty: 0 | Refills: 0 | DISCHARGE

## 2022-07-22 RX ORDER — ASPIRIN/CALCIUM CARB/MAGNESIUM 324 MG
1 TABLET ORAL
Qty: 0 | Refills: 0 | DISCHARGE

## 2022-07-22 RX ORDER — HEPARIN SODIUM 5000 [USP'U]/ML
5000 INJECTION INTRAVENOUS; SUBCUTANEOUS EVERY 8 HOURS
Refills: 0 | Status: DISCONTINUED | OUTPATIENT
Start: 2022-07-22 | End: 2022-07-25

## 2022-07-22 RX ADMIN — MORPHINE SULFATE 2 MILLIGRAM(S): 50 CAPSULE, EXTENDED RELEASE ORAL at 14:01

## 2022-07-22 RX ADMIN — Medication 25 MICROGRAM(S): at 22:51

## 2022-07-22 RX ADMIN — HYDROMORPHONE HYDROCHLORIDE 0.5 MILLIGRAM(S): 2 INJECTION INTRAMUSCULAR; INTRAVENOUS; SUBCUTANEOUS at 12:08

## 2022-07-22 RX ADMIN — HYDROMORPHONE HYDROCHLORIDE 0.5 MILLIGRAM(S): 2 INJECTION INTRAMUSCULAR; INTRAVENOUS; SUBCUTANEOUS at 11:48

## 2022-07-22 RX ADMIN — Medication 400 MILLIGRAM(S): at 12:37

## 2022-07-22 RX ADMIN — HYDROMORPHONE HYDROCHLORIDE 0.5 MILLIGRAM(S): 2 INJECTION INTRAMUSCULAR; INTRAVENOUS; SUBCUTANEOUS at 12:37

## 2022-07-22 RX ADMIN — MORPHINE SULFATE 2 MILLIGRAM(S): 50 CAPSULE, EXTENDED RELEASE ORAL at 13:40

## 2022-07-22 RX ADMIN — Medication 15 MILLIGRAM(S): at 18:39

## 2022-07-22 RX ADMIN — ONDANSETRON 4 MILLIGRAM(S): 8 TABLET, FILM COATED ORAL at 22:52

## 2022-07-22 RX ADMIN — HYDROMORPHONE HYDROCHLORIDE 0.5 MILLIGRAM(S): 2 INJECTION INTRAMUSCULAR; INTRAVENOUS; SUBCUTANEOUS at 12:16

## 2022-07-22 RX ADMIN — Medication 1000 MILLIGRAM(S): at 12:37

## 2022-07-22 RX ADMIN — Medication 1000 MILLIGRAM(S): at 21:53

## 2022-07-22 RX ADMIN — MORPHINE SULFATE 2 MILLIGRAM(S): 50 CAPSULE, EXTENDED RELEASE ORAL at 16:04

## 2022-07-22 RX ADMIN — Medication 400 MILLIGRAM(S): at 21:45

## 2022-07-22 RX ADMIN — SODIUM CHLORIDE 100 MILLILITER(S): 9 INJECTION, SOLUTION INTRAVENOUS at 12:33

## 2022-07-22 RX ADMIN — SODIUM CHLORIDE 75 MILLILITER(S): 9 INJECTION, SOLUTION INTRAVENOUS at 11:54

## 2022-07-22 RX ADMIN — ONDANSETRON 4 MILLIGRAM(S): 8 TABLET, FILM COATED ORAL at 11:51

## 2022-07-22 RX ADMIN — HYDROMORPHONE HYDROCHLORIDE 0.5 MILLIGRAM(S): 2 INJECTION INTRAMUSCULAR; INTRAVENOUS; SUBCUTANEOUS at 12:21

## 2022-07-22 NOTE — BRIEF OPERATIVE NOTE - NSICDXBRIEFPREOP_GEN_ALL_CORE_FT
PRE-OP DIAGNOSIS:  Ventral incisional hernia 22-Jul-2022 11:57:05 Reducible, defect about 12x6 cm Jonathon Prakash

## 2022-07-22 NOTE — BRIEF OPERATIVE NOTE - OPERATION/FINDINGS
Patient with large ventral incisional hernia. Hx of Darrell with reversal. Midline incision. Large hernia approximately 12x6 cm identified. Extensive lysis of adhesions. Closed old defect in mesocolon (transverse). Closed another smaller old defect in small bowel mesentery. Repaired hernia with ventrio mesh. Tension free repair. Mesh size 18 cm vertically. 2 JPs placed over mesh. Closed layer of fascia over the mesh. Skin closed with staples.

## 2022-07-22 NOTE — BRIEF OPERATIVE NOTE - NSICDXBRIEFPOSTOP_GEN_ALL_CORE_FT
POST-OP DIAGNOSIS:  Ventral incisional hernia 22-Jul-2022 11:57:34 Reducible, defect about 12x6 cm Jonathon Prakash

## 2022-07-22 NOTE — ASU PREOP CHECKLIST - AS TEMP SITE
[FreeTextEntry1] : 8/24/20\par Plan- Patient wound is healed, patient told not to wrap arm to prevent moisture from building up and breaking the new skin down. Patient advised to prevent any swelling to arm and that the redness may not fade for some more time.  oral

## 2022-07-22 NOTE — CHART NOTE - NSCHARTNOTEFT_GEN_A_CORE
Post Operative Note  Patient: PRIYANKA DOHERTY 72y (1950) Female   MRN: [id number]  Location: [address]  Visit: 07-22-22 Inpatient  Date: 07-22-22 @ 18:41    Procedure: S/P  lysis of adhesions, closed old defect in mesocolon (transverse) & old defect in small bowel mesentery, and repaired hernia with ventrio mesh    Subjective: Patient has not yet ambulated, voided, passed flatus, or had a bowel movement. Patient states she has some nausea and pain that's 8/10 near incision site. Abdominal binder to stay on and dressings are not to be changed.     Objective:  Vitals: T(F): 98.4 (07-22-22 @ 17:21), Max: 98.6 (07-22-22 @ 06:39)  HR: 61 (07-22-22 @ 17:21)  BP: 101/58 (07-22-22 @ 17:21) (90/55 - 117/67)  RR: 18 (07-22-22 @ 17:21)  SpO2: 98% (07-22-22 @ 17:21)  Vent Settings:     In:   07-22-22 @ 07:01  -  07-22-22 @ 18:41  --------------------------------------------------------  IN: 275 mL    IV Fluids: lactated ringers. 1000 milliLiter(s) (100 mL/Hr) IV Continuous <Continuous>    Out:   07-22-22 @ 07:01  -  07-22-22 @ 18:41  --------------------------------------------------------  OUT: 0 mL    EBL:     Voided Urine:   07-22-22 @ 07:01  -  07-22-22 @ 18:41  --------------------------------------------------------  OUT: 0 mL    Drains:   YELITZA: 2 jps placed over mesh       NG Tube: Yes    Physical Examination:  General Appearance: NAD  HEENT: EOMI, sclera non-icteric.  Heart: RRR  Lungs: CTABL  Abdomen:  Soft, nontender, nondistended. No rigidity, guarding, or rebound tenderness.   MSK/Extremities: Warm & well-perfused. Peripheral pulses intact.  Skin: Warm, dry. No jaundice.   Incisions/Wounds: Dressings in place, clean, dry and intact, no signs of infection/active bleeding/drainage    Medications: [Standing]  acetaminophen   IVPB .. 1000 milliGRAM(s) IV Intermittent once  heparin   Injectable 5000 Unit(s) SubCutaneous every 8 hours  HYDROmorphone  Injectable 0.5 milliGRAM(s) IV Push every 4 hours PRN  ketorolac   Injectable 15 milliGRAM(s) IV Push every 6 hours PRN  lactated ringers. 1000 milliLiter(s) IV Continuous <Continuous>  levothyroxine Injectable 25 MICROGram(s) IV Push at bedtime  pantoprazole  Injectable 40 milliGRAM(s) IV Push daily    Medications: [PRN]  acetaminophen   IVPB .. 1000 milliGRAM(s) IV Intermittent once  heparin   Injectable 5000 Unit(s) SubCutaneous every 8 hours  HYDROmorphone  Injectable 0.5 milliGRAM(s) IV Push every 4 hours PRN  ketorolac   Injectable 15 milliGRAM(s) IV Push every 6 hours PRN  lactated ringers. 1000 milliLiter(s) IV Continuous <Continuous>  levothyroxine Injectable 25 MICROGram(s) IV Push at bedtime  pantoprazole  Injectable 40 milliGRAM(s) IV Push daily    Assessment:  72yFemale patient S/P lysis of adhesions, closed old defect in mesocolon (transverse) & old defect in small bowel mesentery, and repaired hernia with ventrio mesh    Plan:   - NPO. NG tube to suction  - Possible advance diet to clear in AM  - Once NG tube removed, restart home PO medications  - abdominal binder to stay on at all times.  - do not change the dressing  - monitor j/p outputs   - monitor urine output  - monitor electrolytes and replete as necessary  - f/u bowel function     Date/Time: 07-22-22 @ 18:41

## 2022-07-23 ENCOUNTER — TRANSCRIPTION ENCOUNTER (OUTPATIENT)
Age: 72
End: 2022-07-23

## 2022-07-23 LAB
ANION GAP SERPL CALC-SCNC: 10 MMOL/L — SIGNIFICANT CHANGE UP (ref 7–14)
ANION GAP SERPL CALC-SCNC: 10 MMOL/L — SIGNIFICANT CHANGE UP (ref 7–14)
BUN SERPL-MCNC: 12 MG/DL — SIGNIFICANT CHANGE UP (ref 10–20)
BUN SERPL-MCNC: 8 MG/DL — LOW (ref 10–20)
CALCIUM SERPL-MCNC: 7.8 MG/DL — LOW (ref 8.5–10.1)
CALCIUM SERPL-MCNC: 8.3 MG/DL — LOW (ref 8.5–10.1)
CHLORIDE SERPL-SCNC: 107 MMOL/L — SIGNIFICANT CHANGE UP (ref 98–110)
CHLORIDE SERPL-SCNC: 108 MMOL/L — SIGNIFICANT CHANGE UP (ref 98–110)
CO2 SERPL-SCNC: 23 MMOL/L — SIGNIFICANT CHANGE UP (ref 17–32)
CO2 SERPL-SCNC: 24 MMOL/L — SIGNIFICANT CHANGE UP (ref 17–32)
CREAT SERPL-MCNC: 0.5 MG/DL — LOW (ref 0.7–1.5)
CREAT SERPL-MCNC: 0.5 MG/DL — LOW (ref 0.7–1.5)
EGFR: 100 ML/MIN/1.73M2 — SIGNIFICANT CHANGE UP
EGFR: 100 ML/MIN/1.73M2 — SIGNIFICANT CHANGE UP
GLUCOSE SERPL-MCNC: 112 MG/DL — HIGH (ref 70–99)
GLUCOSE SERPL-MCNC: 83 MG/DL — SIGNIFICANT CHANGE UP (ref 70–99)
HCT VFR BLD CALC: 27.9 % — LOW (ref 37–47)
HCT VFR BLD CALC: 30.7 % — LOW (ref 37–47)
HGB BLD-MCNC: 10.3 G/DL — LOW (ref 12–16)
HGB BLD-MCNC: 9.4 G/DL — LOW (ref 12–16)
MAGNESIUM SERPL-MCNC: 1.8 MG/DL — SIGNIFICANT CHANGE UP (ref 1.8–2.4)
MAGNESIUM SERPL-MCNC: 2.2 MG/DL — SIGNIFICANT CHANGE UP (ref 1.8–2.4)
MCHC RBC-ENTMCNC: 29.8 PG — SIGNIFICANT CHANGE UP (ref 27–31)
MCHC RBC-ENTMCNC: 30.1 PG — SIGNIFICANT CHANGE UP (ref 27–31)
MCHC RBC-ENTMCNC: 33.6 G/DL — SIGNIFICANT CHANGE UP (ref 32–37)
MCHC RBC-ENTMCNC: 33.7 G/DL — SIGNIFICANT CHANGE UP (ref 32–37)
MCV RBC AUTO: 88.7 FL — SIGNIFICANT CHANGE UP (ref 81–99)
MCV RBC AUTO: 89.4 FL — SIGNIFICANT CHANGE UP (ref 81–99)
NRBC # BLD: 0 /100 WBCS — SIGNIFICANT CHANGE UP (ref 0–0)
NRBC # BLD: 0 /100 WBCS — SIGNIFICANT CHANGE UP (ref 0–0)
PHOSPHATE SERPL-MCNC: 1.7 MG/DL — LOW (ref 2.1–4.9)
PHOSPHATE SERPL-MCNC: 3.4 MG/DL — SIGNIFICANT CHANGE UP (ref 2.1–4.9)
PLATELET # BLD AUTO: 182 K/UL — SIGNIFICANT CHANGE UP (ref 130–400)
PLATELET # BLD AUTO: 202 K/UL — SIGNIFICANT CHANGE UP (ref 130–400)
POTASSIUM SERPL-MCNC: 4.3 MMOL/L — SIGNIFICANT CHANGE UP (ref 3.5–5)
POTASSIUM SERPL-MCNC: 4.9 MMOL/L — SIGNIFICANT CHANGE UP (ref 3.5–5)
POTASSIUM SERPL-SCNC: 4.3 MMOL/L — SIGNIFICANT CHANGE UP (ref 3.5–5)
POTASSIUM SERPL-SCNC: 4.9 MMOL/L — SIGNIFICANT CHANGE UP (ref 3.5–5)
RBC # BLD: 3.12 M/UL — LOW (ref 4.2–5.4)
RBC # BLD: 3.46 M/UL — LOW (ref 4.2–5.4)
RBC # FLD: 14.9 % — HIGH (ref 11.5–14.5)
RBC # FLD: 15.3 % — HIGH (ref 11.5–14.5)
SODIUM SERPL-SCNC: 140 MMOL/L — SIGNIFICANT CHANGE UP (ref 135–146)
SODIUM SERPL-SCNC: 142 MMOL/L — SIGNIFICANT CHANGE UP (ref 135–146)
WBC # BLD: 6.34 K/UL — SIGNIFICANT CHANGE UP (ref 4.8–10.8)
WBC # BLD: 8.48 K/UL — SIGNIFICANT CHANGE UP (ref 4.8–10.8)
WBC # FLD AUTO: 6.34 K/UL — SIGNIFICANT CHANGE UP (ref 4.8–10.8)
WBC # FLD AUTO: 8.48 K/UL — SIGNIFICANT CHANGE UP (ref 4.8–10.8)

## 2022-07-23 RX ORDER — CEFAZOLIN SODIUM 1 G
1000 VIAL (EA) INJECTION ONCE
Refills: 0 | Status: COMPLETED | OUTPATIENT
Start: 2022-07-23 | End: 2022-07-23

## 2022-07-23 RX ORDER — CEFAZOLIN SODIUM 1 G
1000 VIAL (EA) INJECTION EVERY 12 HOURS
Refills: 0 | Status: DISCONTINUED | OUTPATIENT
Start: 2022-07-23 | End: 2022-07-25

## 2022-07-23 RX ORDER — CEFAZOLIN SODIUM 1 G
VIAL (EA) INJECTION
Refills: 0 | Status: DISCONTINUED | OUTPATIENT
Start: 2022-07-23 | End: 2022-07-25

## 2022-07-23 RX ORDER — MAGNESIUM SULFATE 500 MG/ML
2 VIAL (ML) INJECTION ONCE
Refills: 0 | Status: COMPLETED | OUTPATIENT
Start: 2022-07-23 | End: 2022-07-23

## 2022-07-23 RX ORDER — LEVOTHYROXINE SODIUM 125 MCG
50 TABLET ORAL DAILY
Refills: 0 | Status: DISCONTINUED | OUTPATIENT
Start: 2022-07-23 | End: 2022-07-25

## 2022-07-23 RX ORDER — ONDANSETRON 8 MG/1
4 TABLET, FILM COATED ORAL EVERY 6 HOURS
Refills: 0 | Status: DISCONTINUED | OUTPATIENT
Start: 2022-07-23 | End: 2022-07-25

## 2022-07-23 RX ORDER — ACETAMINOPHEN 500 MG
650 TABLET ORAL EVERY 6 HOURS
Refills: 0 | Status: DISCONTINUED | OUTPATIENT
Start: 2022-07-23 | End: 2022-07-25

## 2022-07-23 RX ADMIN — PANTOPRAZOLE SODIUM 40 MILLIGRAM(S): 20 TABLET, DELAYED RELEASE ORAL at 11:26

## 2022-07-23 RX ADMIN — Medication 100 MILLIGRAM(S): at 22:13

## 2022-07-23 RX ADMIN — SODIUM CHLORIDE 100 MILLILITER(S): 9 INJECTION, SOLUTION INTRAVENOUS at 11:22

## 2022-07-23 RX ADMIN — Medication 12.5 GRAM(S): at 06:15

## 2022-07-23 RX ADMIN — HEPARIN SODIUM 5000 UNIT(S): 5000 INJECTION INTRAVENOUS; SUBCUTANEOUS at 15:07

## 2022-07-23 RX ADMIN — Medication 15 MILLIGRAM(S): at 15:18

## 2022-07-23 RX ADMIN — Medication 15 MILLIGRAM(S): at 15:03

## 2022-07-23 RX ADMIN — ONDANSETRON 4 MILLIGRAM(S): 8 TABLET, FILM COATED ORAL at 09:19

## 2022-07-23 RX ADMIN — Medication 650 MILLIGRAM(S): at 11:26

## 2022-07-23 RX ADMIN — Medication 650 MILLIGRAM(S): at 11:56

## 2022-07-23 RX ADMIN — Medication 15 MILLIGRAM(S): at 06:49

## 2022-07-23 RX ADMIN — Medication 15 MILLIGRAM(S): at 00:49

## 2022-07-23 RX ADMIN — HEPARIN SODIUM 5000 UNIT(S): 5000 INJECTION INTRAVENOUS; SUBCUTANEOUS at 00:42

## 2022-07-23 RX ADMIN — HEPARIN SODIUM 5000 UNIT(S): 5000 INJECTION INTRAVENOUS; SUBCUTANEOUS at 08:20

## 2022-07-23 RX ADMIN — Medication 100 MILLIGRAM(S): at 11:24

## 2022-07-23 RX ADMIN — Medication 15 MILLIGRAM(S): at 21:10

## 2022-07-23 RX ADMIN — SODIUM CHLORIDE 100 MILLILITER(S): 9 INJECTION, SOLUTION INTRAVENOUS at 22:13

## 2022-07-23 RX ADMIN — Medication 15 MILLIGRAM(S): at 01:15

## 2022-07-23 NOTE — DISCHARGE NOTE NURSING/CASE MANAGEMENT/SOCIAL WORK - NSDCPETBCESMAN_GEN_ALL_CORE
no If you are a smoker, it is important for your health to stop smoking. Please be aware that second hand smoke is also harmful.

## 2022-07-23 NOTE — DISCHARGE NOTE NURSING/CASE MANAGEMENT/SOCIAL WORK - NSDCPEFALRISK_GEN_ALL_CORE
For information on Fall & Injury Prevention, visit: [email or web address] OR  [email or web address] OR  https://www.cdc.gov/steadi/patient.html

## 2022-07-23 NOTE — PROVIDER CONTACT NOTE (OTHER) - ACTION/TREATMENT ORDERED:
Provider stated to reschedule dose to maintain q12 hr
Provider aware and stated could be from NGT no intervention

## 2022-07-23 NOTE — PROVIDER CONTACT NOTE (OTHER) - SITUATION
pt received a stat dose of IV Cefazolin at 1124am, order is written for q12 hrs, do you want RN to give dose scheduled at 6pm or reschedule to 1124pm to maintain. q12 hr.
pt stated to RN that she feels nauseous and has slight tongue swelling. pt is drinking and swallowing with no issues, denies difficulty breathing. pt is talking on phone with no issues. VsS

## 2022-07-23 NOTE — DISCHARGE NOTE NURSING/CASE MANAGEMENT/SOCIAL WORK - PATIENT PORTAL LINK FT
You can access the FollowMyHealth Patient Portal offered by [facility] by registering at the following website: [email or web address]. By joining [facility]’s FollowMyHealth portal, you will also be able to view your health information using other applications (apps) compatible with our system.

## 2022-07-23 NOTE — PROGRESS NOTE ADULT - ASSESSMENT
ASSESSMENT:    72y Female patient S/P lysis of adhesions, closed old defect in mesocolon (transverse) & old defect in small bowel mesentery, and repaired hernia with ventrio mesh    PLAN:  - NPO, NG to suction   - Possible CLD in the morning. If so, home PO meds can be given  - ABDOMINAL BINDER TO STAY ON  - NO DRESSING CHANGES  - F/U labs and replete electrolytes as needed  - Pain control  - Monitor vitals  - I's/O's  - Encourage ambulation, coughing, deep breathing, and incentive spirometry

## 2022-07-24 RX ADMIN — Medication 650 MILLIGRAM(S): at 13:06

## 2022-07-24 RX ADMIN — Medication 15 MILLIGRAM(S): at 06:53

## 2022-07-24 RX ADMIN — HEPARIN SODIUM 5000 UNIT(S): 5000 INJECTION INTRAVENOUS; SUBCUTANEOUS at 17:14

## 2022-07-24 RX ADMIN — Medication 100 MILLIGRAM(S): at 06:28

## 2022-07-24 RX ADMIN — HEPARIN SODIUM 5000 UNIT(S): 5000 INJECTION INTRAVENOUS; SUBCUTANEOUS at 07:41

## 2022-07-24 RX ADMIN — Medication 85 MILLIMOLE(S): at 06:28

## 2022-07-24 RX ADMIN — Medication 650 MILLIGRAM(S): at 19:19

## 2022-07-24 RX ADMIN — Medication 650 MILLIGRAM(S): at 13:36

## 2022-07-24 RX ADMIN — Medication 50 MICROGRAM(S): at 06:28

## 2022-07-24 RX ADMIN — HEPARIN SODIUM 5000 UNIT(S): 5000 INJECTION INTRAVENOUS; SUBCUTANEOUS at 00:06

## 2022-07-24 RX ADMIN — Medication 100 MILLIGRAM(S): at 17:14

## 2022-07-24 RX ADMIN — Medication 650 MILLIGRAM(S): at 06:59

## 2022-07-24 RX ADMIN — ONDANSETRON 4 MILLIGRAM(S): 8 TABLET, FILM COATED ORAL at 07:43

## 2022-07-24 RX ADMIN — PANTOPRAZOLE SODIUM 40 MILLIGRAM(S): 20 TABLET, DELAYED RELEASE ORAL at 11:22

## 2022-07-24 RX ADMIN — Medication 650 MILLIGRAM(S): at 19:49

## 2022-07-24 NOTE — PHYSICAL THERAPY INITIAL EVALUATION ADULT - GAIT DISTANCE, PT EVAL
70'x 2 then seated rest period followe by another 30'x 2 to and from [facility]
set-up required/supervision/verbal cues

## 2022-07-24 NOTE — PHYSICAL THERAPY INITIAL EVALUATION ADULT - ADDITIONAL COMMENTS
Pt reports she has Rollator, RW , shower chair at home. Pt reports she has Rollator, RW , shower chair at home. Pt reports she does not feel steady with ambulation at this time and is home alone, her children lives off [address] and work full time and she reports she needs to gets bit stronger prior to going home.

## 2022-07-24 NOTE — DISCHARGE NOTE PROVIDER - NSDCMRMEDTOKEN_GEN_ALL_CORE_FT
aspirin 81 mg oral delayed release tablet: 1 tab(s) orally once a day  docusate sodium 100 mg oral capsule: 1 cap(s) orally 2 times a day  levothyroxine 50 mcg (0.05 mg) oral capsule: 1 cap(s) orally once a day  multivitamin:   oxycodone-acetaminophen 5 mg-325 mg oral tablet: 1 tab(s) orally every 6 hours -for severe pain MDD:4  Xyzal 5 mg oral tablet: 1 tab(s) orally once a day (in the evening)  Zetia 10 mg oral tablet: 1 tab(s) orally once a day

## 2022-07-24 NOTE — DISCHARGE NOTE PROVIDER - INSTRUCTIONS
Please continue with a clear liquid diet until you have a bowel movement at which point you can transition to a full liquid diet until follow up in outpatient clinic.    Activity: No heavy lifting > 10 lbs for 2 weeks. Avoid straining or excessive activity x 6 weeks.     Dressings: Please do not remove the abdominal binder or dressings. YELITZA drains are to be emptied only when full.    Pain control: You may take over-the-counter tylenol and motrin three times per day with food for up to 3 days. Oxycodone was sent to your pharmacy. Please do not drive, operate machinery, or make important decisions while taking this medication. Please take only for severe pain.     Follow up: Please call the number provided to make an appointment with Dr. Juarez in 1 week. Please call with any questions or concerns including fevers, worsening pain, pus from the wounds, or redness of the skin.

## 2022-07-24 NOTE — PHYSICAL THERAPY INITIAL EVALUATION ADULT - GAIT TRAINING, PT EVAL
Improve amb to 150' with supervision using RW by d/c. Pt to negotiate 1 flight with supervision using 2 hands on HR,

## 2022-07-24 NOTE — PHYSICAL THERAPY INITIAL EVALUATION ADULT - GENERAL OBSERVATIONS, REHAB EVAL
8:52-9:40. Pt encountered supine in bed in NAD, + IV, abdominal binder, + YELITZA drains x 2 , c/o abdominal pain and headache 6/10, stated she got Tylenol earlier but did not want pain meds earlier for fear of feeling nauseaus. Pt was agreeable to PT. 8:52-9:40. Pt encountered supine in bed in NAD, + IV, abdominal binder, + YELITZA drains x 2 , + c/o abdominal pain and headache 6/10, stated she got Tylenol earlier but did not want pain meds earlier for fear of feeling nauseaus. Pt was agreeable to PT.

## 2022-07-24 NOTE — DISCHARGE NOTE PROVIDER - HOSPITAL COURSE
73 yo female with past medical history significant for hyperlipidemia, hypothyroidism, s/p hysterectomy who presented for ventral incisional hernia repair with mesh. Intraoperatively there was extensive lysis of adhesions and two defects were closed-one in the mesocolon and one in the small bowel mesentery. Hernia was repaired with ventrio mesh with tension free repair and fascial layer closure over the mesh. Patient's post operative course was stable, she tolerated clear liquids well and was hemodynamically stable.

## 2022-07-24 NOTE — DISCHARGE NOTE PROVIDER - CARE PROVIDER_API CALL
Tj Juarez)  Surgery  [address] [place] [address]  Phone: [phone]  Fax: [phone]  Follow Up Time:

## 2022-07-25 VITALS
RESPIRATION RATE: 18 BRPM | DIASTOLIC BLOOD PRESSURE: 73 MMHG | TEMPERATURE: 99 F | OXYGEN SATURATION: 97 % | HEART RATE: 84 BPM | SYSTOLIC BLOOD PRESSURE: 109 MMHG

## 2022-07-25 RX ADMIN — PANTOPRAZOLE SODIUM 40 MILLIGRAM(S): 20 TABLET, DELAYED RELEASE ORAL at 11:11

## 2022-07-25 RX ADMIN — Medication 650 MILLIGRAM(S): at 08:17

## 2022-07-25 RX ADMIN — Medication 100 MILLIGRAM(S): at 05:19

## 2022-07-25 RX ADMIN — Medication 650 MILLIGRAM(S): at 13:26

## 2022-07-25 RX ADMIN — Medication 650 MILLIGRAM(S): at 02:47

## 2022-07-25 RX ADMIN — Medication 650 MILLIGRAM(S): at 01:19

## 2022-07-25 RX ADMIN — Medication 650 MILLIGRAM(S): at 09:00

## 2022-07-25 RX ADMIN — Medication 50 MICROGRAM(S): at 05:19

## 2022-07-25 RX ADMIN — HEPARIN SODIUM 5000 UNIT(S): 5000 INJECTION INTRAVENOUS; SUBCUTANEOUS at 08:17

## 2022-07-25 RX ADMIN — HEPARIN SODIUM 5000 UNIT(S): 5000 INJECTION INTRAVENOUS; SUBCUTANEOUS at 00:01

## 2022-07-25 NOTE — PROGRESS NOTE ADULT - ASSESSMENT
72y Female patient S/P lysis of adhesions, closed old defect in mesocolon (transverse) & old defect in small bowel mesentery, and repaired hernia with ventrio mesh    Plan:  -Continue w/full liquid diet with ensures  -ABDOMINAL BINDER TO STAY ON  -NO DRESSING CHANGED  -Pain control  -Monitor vitals  -Encourage ambulation, coughing, deep breathing, and IS  -Discharge home 7/25 with VNS services and f/u with Dr. Juarez 7/26.

## 2022-07-25 NOTE — PROGRESS NOTE ADULT - SUBJECTIVE AND OBJECTIVE BOX
DIAGNOSIS:   HOSPITAL DAY #:    STATUS POST:    POST OPERATIVE DAY #:     Vital Signs Last 24 Hrs  T(C): 37.1 (24 Jul 2022 20:32), Max: 37.3 (24 Jul 2022 05:02)  T(F): 98.7 (24 Jul 2022 20:32), Max: 99.1 (24 Jul 2022 05:02)  HR: 70 (24 Jul 2022 20:32) (62 - 82)  BP: 124/75 (24 Jul 2022 20:32) (107/66 - 135/77)  BP(mean): --  RR: 18 (24 Jul 2022 20:32) (18 - 18)  SpO2: 96% (24 Jul 2022 20:32) (94% - 96%)    Parameters below as of 24 Jul 2022 20:32  Patient On (Oxygen Delivery Method): room air        SUBJECTIVE: Pt seen    Pain: YES  [ ]   NO [ ]   Nausea: [ ] YES [ ] NO           Vomiting: [ ] YES [ ] NO  Flatus: [ ] YES [ ] NO             Bowel Movement: [ ] YES [ ] NO     Void: [ ]YES [ ]No      YELITZA DRAINAGE: SIGNIFICANT [ ]   NOT SIGNIFICANT [ ]   NOT APPLICABLE [ ]  YES [ ] NO    General Appearance: Appears well, NAD  Neck: Supple  Chest: Equal expansion bilaterally, equal breath sounds  CV: Pulse regular presently  Abdomen: Soft [x ] YES [ ]NO  DISTENDED [ ] YES [x ] NO TENDERNESS [ ]YES [ ]NO  INCISIONS: HEALING WELL [ ] YES  [ ] NO ERYTHEMA [ ] YES [ ] NO DRAINAGE [ ] YES  [ ] NO  Extremities: Grossly symmetric, CALF TENDERNESS [ ] YES  [ ] NO      LABS:                        9.4    6.34  )-----------( 182      ( 23 Jul 2022 21:44 )             27.9     07-23    140  |  107  |  8<L>  ----------------------------<  83  4.3   |  23  |  0.5<L>    Ca    7.8<L>      23 Jul 2022 21:44  Phos  1.7     07-23  Mg     2.2     07-23              ASSESSMENT: discussed case with pt d/c in am    GOOD POST OP COURSE [ ]  YES  [ ] NO  CONDITION IMPROVING  []  YES  [ ]  NO          PLAN:    CONTINUE PRESENT MANAGEMENT  [ ] YES  [ ] NO                      
GENERAL SURGERY PROGRESS NOTE    Patient: PRIYANKA DOHERTY , 72y ([birth year])Female   MRN: [id number]  Location: [address]  Visit: 07-22-22 Inpatient  Date: 07-25-22 @ 01:13    Hospital Day #: 4  Post-Op Day #: 3    Procedure/Dx/Injuries: S/P ventral hernia incisional repair, TIFFANIE    Events of past 24 hours: Worked with PT, and was unsteady on her feet going down stairs - explained to patient could benefit one more day in hospital. Patient advanced to full liquid diet with ensures added - tolerating well.     PAST MEDICAL & SURGICAL HISTORY:  Hypothyroidism      HLD (hyperlipidemia)      Seasonal allergies      Mitral valve prolapse      S/P hysterectomy      H/O hernia repair  colostomy      History of colostomy reversal  2/2022      Ankle fracture, right  pins and plates placed 2011          Vitals:   T(F): 98.1 (07-25-22 @ 00:47), Max: 99.1 (07-24-22 @ 05:02)  HR: 64 (07-25-22 @ 00:47)  BP: 113/69 (07-25-22 @ 00:47)  RR: 18 (07-25-22 @ 00:47)  SpO2: 97% (07-25-22 @ 00:47)      Diet, Full Liquid:   Supplement Feeding Modality:  Oral  Ensure Enlive Cans or Servings Per Day:  3       Frequency:  Three Times a day      Fluids:     I & O's:    07-23-22 @ 07:01  -  07-24-22 @ 07:00  --------------------------------------------------------  IN:    IV PiggyBack: 133.3 mL    Lactated Ringers: 1200 mL    Oral Fluid: 600 mL  Total IN: 1933.3 mL    OUT:    Bulb (mL): 40 mL    Bulb (mL): 65 mL    Nasogastric/Oral tube (mL): 0 mL    Voided (mL): 550 mL  Total OUT: 655 mL    Total NET: 1278.3 mL      PHYSICAL EXAM:  General: NAD, AAOx3, calm and cooperative  Cardiac: RRR S1, S2  Respiratory: CTAB, normal respiratory effort  Abdomen: Soft, non-distended, non-tender, no rebound, no guarding. +BS.  Vascular: Pulses 2+ throughout, extremities well perfused  Skin: Warm/dry, normal color, no jaundice  Incision/wound: healing well, dressings in place, clean, dry and intact      MEDICATIONS  (STANDING):  ceFAZolin   IVPB 1000 milliGRAM(s) IV Intermittent every 12 hours  ceFAZolin   IVPB      heparin   Injectable 5000 Unit(s) SubCutaneous every 8 hours  levothyroxine 50 MICROGram(s) Oral daily  pantoprazole  Injectable 40 milliGRAM(s) IV Push daily    MEDICATIONS  (PRN):  acetaminophen     Tablet .. 650 milliGRAM(s) Oral every 6 hours PRN Moderate Pain (4 - 6)  HYDROmorphone  Injectable 0.5 milliGRAM(s) IV Push every 4 hours PRN Severe Pain (7 - 10)  ondansetron Injectable 4 milliGRAM(s) IV Push every 6 hours PRN Nausea      DVT PROPHYLAXIS: heparin   Injectable 5000 Unit(s) SubCutaneous every 8 hours    GI PROPHYLAXIS: pantoprazole  Injectable 40 milliGRAM(s) IV Push daily    ANTICOAGULATION:   ANTIBIOTICS:  ceFAZolin   IVPB 1000 milliGRAM(s)  ceFAZolin   IVPB              LAB/STUDIES:  Labs:  CAPILLARY BLOOD GLUCOSE                              9.4    6.34  )-----------( 182      ( 23 Jul 2022 21:44 )             27.9         07-23    140  |  107  |  8<L>  ----------------------------<  83  4.3   |  23  |  0.5<L>      
GENERAL SURGERY PROGRESS NOTE    Patient: PRIYANKA DOHERTY , 72y ([birth year])Female   MRN: [id number]  Location: [address]  Visit: 07-22-22 Inpatient  Date: 07-23-22 @ 04:15    Hospital Day #: 2  Post-Op Day #: 1    Procedure/Dx/Injuries: S/P VENTAL INCISIONAL HERNIA REPAIR, TIFFANIE,    Events of past 24 hours:  - Patient seen resting comfortably in bed  - Patient experiencing some nausea. Zofran 4mg given.   - Patient has not yet ambulated, voided, passed flatus, or had a bowel movement.   - Abdominal binder to stay on and dressings are not to be changed.    PAST MEDICAL & SURGICAL HISTORY:  Hypothyroidism  HLD (hyperlipidemia)  Seasonal allergies  Mitral valve prolapse  S/P hysterectomy  H/O hernia repair  colostomy  History of colostomy reversal  2/2022  Ankle fracture, right  pins and plates placed 2011    Vitals:   T(F): 99 (07-23-22 @ 00:29), Max: 99 (07-23-22 @ 00:29)  HR: 82 (07-23-22 @ 00:29)  BP: 99/60 (07-23-22 @ 00:29)  RR: 18 (07-23-22 @ 00:29)  SpO2: 98% (07-23-22 @ 00:29)    Diet, NPO:   Except Medications  With Ice Chips/Sips of Water     Special Instructions for Nursing:  Except Medications    Fluids: lactated ringers.: Solution, 1000 milliLiter(s) infuse at 100 mL/Hr    I & O's:    PHYSICAL EXAM:  General: NAD, AAOx3, calm and cooperative  Cardiac: RRR S1, S2  Respiratory: CTAB, normal respiratory effort  Abdomen: Soft, non-distended, non-tender, no rebound, no guarding. +BS.  Vascular: Pulses 2+ throughout, extremities well perfused  Skin: Warm/dry, normal color, no jaundice  Incision/wound: healing well, dressings in place, clean, dry and intact    MEDICATIONS  (STANDING):  heparin   Injectable 5000 Unit(s) SubCutaneous every 8 hours  lactated ringers. 1000 milliLiter(s) (100 mL/Hr) IV Continuous <Continuous>  levothyroxine Injectable 25 MICROGram(s) IV Push at bedtime  magnesium sulfate  IVPB 2 Gram(s) IV Intermittent once  pantoprazole  Injectable 40 milliGRAM(s) IV Push daily    MEDICATIONS  (PRN):  HYDROmorphone  Injectable 0.5 milliGRAM(s) IV Push every 4 hours PRN Severe Pain (7 - 10)  ketorolac   Injectable 15 milliGRAM(s) IV Push every 6 hours PRN Moderate Pain (4 - 6)    DVT PROPHYLAXIS: heparin   Injectable 5000 Unit(s) SubCutaneous every 8 hours    GI PROPHYLAXIS: pantoprazole  Injectable 40 milliGRAM(s) IV Push daily    ANTICOAGULATION:   ANTIBIOTICS:      LAB/STUDIES:  Labs:  CAPILLARY BLOOD GLUCOSE                          10.3   8.48  )-----------( 202      ( 22 Jul 2022 20:00 )             30.7         07-22    142  |  108  |  12  ----------------------------<  112<H>  4.9   |  24  |  0.5<L>      Calcium, Total Serum: 8.3 mg/dL (07-22-22 @ 20:00)          IMAGING:  < from: [facility] Duplex Lower Ext Vein Scan, Bilat (02.06.22 @ 18:19) >  Impression:    No evidence of deep venous thrombosis or superficial thrombophlebitis in   the bilateral lower extremities.    ICD-10:M79.89    --- End of Report ---      < end of copied text >  < from: Xray Chest 1 View- PORTABLE-Routine (02.05.22 @ 08:45) >  Impression:    Noradiographic evidence of acute cardiopulmonary disease.        --- End of Report ---    < end of copied text >

## 2022-09-19 NOTE — DISCHARGE NOTE NURSING/CASE MANAGEMENT/SOCIAL WORK - NSTRANSFERBELONGINGSRESP_GEN_A_NUR
Called patient to follow up on this, he was made aware of ICD adjustments needed. 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   yes

## 2022-12-01 ENCOUNTER — OUTPATIENT (OUTPATIENT)
Dept: OUTPATIENT SERVICES | Facility: HOSPITAL | Age: 72
LOS: 1 days | Discharge: HOME | End: 2022-12-01

## 2022-12-01 DIAGNOSIS — Z90.710 ACQUIRED ABSENCE OF BOTH CERVIX AND UTERUS: Chronic | ICD-10-CM

## 2022-12-01 DIAGNOSIS — Z12.31 ENCOUNTER FOR SCREENING MAMMOGRAM FOR MALIGNANT NEOPLASM OF BREAST: ICD-10-CM

## 2022-12-01 DIAGNOSIS — S82.891A OTHER FRACTURE OF RIGHT LOWER LEG, INITIAL ENCOUNTER FOR CLOSED FRACTURE: Chronic | ICD-10-CM

## 2022-12-01 DIAGNOSIS — Z98.890 OTHER SPECIFIED POSTPROCEDURAL STATES: Chronic | ICD-10-CM

## 2022-12-01 PROCEDURE — 77067 SCR MAMMO BI INCL CAD: CPT | Mod: 26

## 2022-12-01 PROCEDURE — 77063 BREAST TOMOSYNTHESIS BI: CPT | Mod: 26

## 2022-12-03 PROBLEM — J30.2 OTHER SEASONAL ALLERGIC RHINITIS: Chronic | Status: ACTIVE | Noted: 2022-07-14

## 2022-12-03 PROBLEM — I34.1 NONRHEUMATIC MITRAL (VALVE) PROLAPSE: Chronic | Status: ACTIVE | Noted: 2022-07-22

## 2022-12-09 NOTE — PHYSICAL THERAPY INITIAL EVALUATION ADULT - DIAGNOSIS, PT EVAL
(M6) obeys commands
Gait dysfunction s/p perforated viscus, exploratory celiotomy, ingram's colostomy

## 2022-12-16 NOTE — PHYSICAL THERAPY INITIAL EVALUATION ADULT - RANGE OF MOTION EXAMINATION, REHAB EVAL
-- DO NOT REPLY / DO NOT REPLY ALL --  -- Message is from Engagement Center Operations (ECO) --    General Patient Message Daughter called stating Patient has apt 1/19 and requests bloodwork before hand. Please call [phone]    Caller Information       Type Contact Phone/Fax    12/16/2022 02:23 PM CST Phone (Incoming) PEDRO CLAROS DAUGHTER (Emergency Contact) [phone]        Alternative phone number:non  Can a detailed message be left? Yes    Message Turnaround: [place]:    Refer to site's KB page for routing instructions    Please give this turnaround time to the caller:   \"You can expect to receive a response 1-3 business days after your provider's clinical team reviews the message\"              
B UE/CHRIS WFL
no ROM deficits were identified

## 2022-12-22 NOTE — ED ADULT TRIAGE NOTE - TEMPERATURE IN CELSIUS (DEGREES C)
38.6

## 2023-08-03 NOTE — H&P PST ADULT - MAMMOGRAM, RESULTS OF LAST, PROFILE
Disposition Considerations (Tests not done, Shared Decision Making, Pt Expectation of Test or Tx.): n/a     FINAL IMPRESSION     1. Leg edema          DISPOSITION/PLAN   DISPOSITION Decision To Discharge 08/03/2023 10:43:46 AM      Discharge Note: The patient is stable for discharge home. The signs, symptoms, diagnosis, and discharge instructions have been discussed, understanding conveyed, and agreed upon. The patient is to follow up as recommended or return to ER should their symptoms worsen. PATIENT REFERRED TO:  No follow-up provider specified. DISCHARGE MEDICATIONS:     Medication List        CONTINUE taking these medications      glucose monitoring kit  1 kit by Does not apply route daily            ASK your doctor about these medications      acetaminophen 500 MG tablet  Commonly known as: TYLENOL     amLODIPine 10 MG tablet  Commonly known as: NORVASC     aspirin 81 MG chewable tablet     blood glucose test strips strip  Commonly known as: ASCENSIA AUTODISC VI;ONE TOUCH ULTRA TEST VI  Apply 1 each topically daily One Touch Ultra Blue test Strips     cloNIDine 0.1 MG tablet  Commonly known as: CATAPRES     escitalopram 5 MG tablet  Commonly known as: LEXAPRO     furosemide 20 MG tablet  Commonly known as: LASIX  Take 1 tablet by mouth daily as needed (edema)     gabapentin 300 MG capsule  Commonly known as: NEURONTIN     hydroCHLOROthiazide 25 MG tablet  Commonly known as: HYDRODIURIL     levETIRAcetam 1000 MG tablet  Commonly known as: KEPPRA     lisinopril 40 MG tablet  Commonly known as: PRINIVIL;ZESTRIL     metFORMIN 1000 MG tablet  Commonly known as: GLUCOPHAGE     NovoLIN 70/30 FlexPen (70-30) 100 UNIT per ML injection pen  Generic drug: Insulin NPH Isophane & Regular     pravastatin 20 MG tablet  Commonly known as: PRAVACHOL                DISCONTINUED MEDICATIONS:  Current Discharge Medication List          I am the Primary Clinician of Record.    Cydney Rodríguez MD (electronically WNL

## 2023-12-04 ENCOUNTER — OUTPATIENT (OUTPATIENT)
Dept: OUTPATIENT SERVICES | Facility: HOSPITAL | Age: 73
LOS: 1 days | End: 2023-12-04
Payer: MEDICARE

## 2023-12-04 DIAGNOSIS — S82.891A OTHER FRACTURE OF RIGHT LOWER LEG, INITIAL ENCOUNTER FOR CLOSED FRACTURE: Chronic | ICD-10-CM

## 2023-12-04 DIAGNOSIS — Z98.890 OTHER SPECIFIED POSTPROCEDURAL STATES: Chronic | ICD-10-CM

## 2023-12-04 DIAGNOSIS — Z90.710 ACQUIRED ABSENCE OF BOTH CERVIX AND UTERUS: Chronic | ICD-10-CM

## 2023-12-04 DIAGNOSIS — Z12.31 ENCOUNTER FOR SCREENING MAMMOGRAM FOR MALIGNANT NEOPLASM OF BREAST: ICD-10-CM

## 2023-12-04 PROCEDURE — 77063 BREAST TOMOSYNTHESIS BI: CPT

## 2023-12-04 PROCEDURE — 77067 SCR MAMMO BI INCL CAD: CPT | Mod: 26

## 2023-12-04 PROCEDURE — 77067 SCR MAMMO BI INCL CAD: CPT

## 2023-12-04 PROCEDURE — 77063 BREAST TOMOSYNTHESIS BI: CPT | Mod: 26

## 2023-12-05 DIAGNOSIS — Z12.31 ENCOUNTER FOR SCREENING MAMMOGRAM FOR MALIGNANT NEOPLASM OF BREAST: ICD-10-CM

## 2024-03-08 NOTE — ASU PATIENT PROFILE, ADULT - PATIENT REPRESENTATIVE: ( YOU CAN CHOOSE ANY PERSON THAT CAN ASSIST YOU WITH YOUR HEALTH CARE PREFERENCES, DOES NOT HAVE TO BE A SPOUSE, IMMEDIATE FAMILY OR SIGNIFICANT OTHER/PARTNER)
[de-identified] : (Exam: Shoulder)   Laterality is right    Patient is in no acute distress, alert and oriented Sensation is grossly intact to light touch in the hand Motor function is 5/5 in the hand Capillary refill is less than 2 seconds in the fingers Lymphadenopathy is not present Peripheral edema is not present   Skin is intact Swelling is not present Atrophy is not present Scapular winging is not present Deformity of the AC joint is not present Deformity of the biceps is not present   Bicipital groove tenderness is present AC joint tenderness is not present Scapulothoracic tenderness is not present   Active forward elevation is 160 Passive forward elevation is 175 External rotation at the side is 60 Internal rotation behind the back is to the level of T12   Forward elevation strength is 5-/5 External rotation strength at the side is 5/5 Internal rotation strength at the side is 5/5 Deltoid strength of anterior, posterior and lateral heads is 5/5   Meyer test is abnormal OBriens test is abnormal Empty can test is abnormal Speeds test is abnormal Cross body adduction test is normal Belly press test is normal Apprehension and relocation is normal Sulcus sign is normal    [Calcific density] : Calcific density [Right] : right shoulder Yes

## 2024-12-06 ENCOUNTER — OUTPATIENT (OUTPATIENT)
Dept: OUTPATIENT SERVICES | Facility: HOSPITAL | Age: 74
LOS: 1 days | End: 2024-12-06
Payer: MEDICARE

## 2024-12-06 DIAGNOSIS — Z90.710 ACQUIRED ABSENCE OF BOTH CERVIX AND UTERUS: Chronic | ICD-10-CM

## 2024-12-06 DIAGNOSIS — Z98.890 OTHER SPECIFIED POSTPROCEDURAL STATES: Chronic | ICD-10-CM

## 2024-12-06 DIAGNOSIS — Z12.31 ENCOUNTER FOR SCREENING MAMMOGRAM FOR MALIGNANT NEOPLASM OF BREAST: ICD-10-CM

## 2024-12-06 DIAGNOSIS — S82.891A OTHER FRACTURE OF RIGHT LOWER LEG, INITIAL ENCOUNTER FOR CLOSED FRACTURE: Chronic | ICD-10-CM

## 2024-12-06 PROCEDURE — 77063 BREAST TOMOSYNTHESIS BI: CPT | Mod: 26

## 2024-12-06 PROCEDURE — 77063 BREAST TOMOSYNTHESIS BI: CPT

## 2024-12-06 PROCEDURE — 77067 SCR MAMMO BI INCL CAD: CPT | Mod: 26

## 2024-12-06 PROCEDURE — 77067 SCR MAMMO BI INCL CAD: CPT

## 2024-12-07 DIAGNOSIS — Z12.31 ENCOUNTER FOR SCREENING MAMMOGRAM FOR MALIGNANT NEOPLASM OF BREAST: ICD-10-CM

## 2025-04-22 NOTE — H&P PST ADULT - NSANTHOSAYNRD_GEN_A_CORE
PHQ-9  Met with pt to assess for support and needs  Pt states she has been residing at [facility]) for the past 3 years. Has a , 1 son, 1 dtr, 5 grand children and 2 great grandchildren  Pt states her children live out of town.  Pt denies having any SI/depression      Patient Health Questionnaire-9 (PHQ-9)    Over the last 2 weeks, how often have you been bothered by any of the following problems?    1. Little Interest or pleasure in doing things?   [x] Not at all  [] Several Days  [] More than half the day  []  Nearly every day    2. Feeling down, depressed or hopeless?    [x] Not at all  [] Several Days  [] More than half the day  []  Nearly every day    3. Trouble falling or staying asleep, or sleeping too much?   [x] Not at all  [] Several Days  [] More than half the day  []  Nearly every day    4. Feeling tired or having little energy?   [] Not at all  [x] Several Days  [] More than half the day  []  Nearly every day    5. Poor apettite or overeating?   [x] Not at all  [] Several Days  [] More than half the day  []  Nearly every day    6. Feeling bad about yourself-or that you are a failure or have let yourself or your family down?   [x] Not at all  [] Several Days  [] More than half the day  []  Nearly every day    7. Trouble concentrating on things, such as reading the newspaper or watching television?   [x] Not at all  [] Several Days  [] More than half the day  []  Nearly every day    8. Moving or speaking so slowly that other people could have noticed? Or the opposite-being so fidgety or restless that you have been moving around a lot more than usual?   [x] Not at all  [] Several Days  [] More than half the day  []  Nearly every day    9. Thoughts that you would be better off dead or of hurting yourself in some way?   [x] Not at all  [] Several Days  [] More than half the day  []  Nearly every day    Total Score: 1    If you checked off any problems, how difficult have these problems made it for  No. FLORENCIA screening performed.  STOP BANG Legend: 0-2 = LOW Risk; 3-4 = INTERMEDIATE Risk; 5-8 = HIGH Risk

## 2025-08-05 ENCOUNTER — NON-APPOINTMENT (OUTPATIENT)
Age: 75
End: 2025-08-05

## 2025-08-07 ENCOUNTER — APPOINTMENT (OUTPATIENT)
Dept: VASCULAR SURGERY | Facility: CLINIC | Age: 75
End: 2025-08-07
Payer: MEDICARE

## 2025-08-07 VITALS — BODY MASS INDEX: 21.9 KG/M2 | HEIGHT: 62 IN | WEIGHT: 119 LBS

## 2025-08-07 DIAGNOSIS — I65.23 OCCLUSION AND STENOSIS OF BILATERAL CAROTID ARTERIES: ICD-10-CM

## 2025-08-07 PROCEDURE — 93880 EXTRACRANIAL BILAT STUDY: CPT

## 2025-08-07 PROCEDURE — 99203 OFFICE O/P NEW LOW 30 MIN: CPT
